# Patient Record
Sex: FEMALE | Race: WHITE | NOT HISPANIC OR LATINO | Employment: FULL TIME | URBAN - METROPOLITAN AREA
[De-identification: names, ages, dates, MRNs, and addresses within clinical notes are randomized per-mention and may not be internally consistent; named-entity substitution may affect disease eponyms.]

---

## 2017-01-12 ENCOUNTER — OFFICE VISIT (OUTPATIENT)
Dept: OBGYN | Facility: CLINIC | Age: 42
End: 2017-01-12
Payer: COMMERCIAL

## 2017-01-12 VITALS
DIASTOLIC BLOOD PRESSURE: 111 MMHG | WEIGHT: 293 LBS | HEART RATE: 139 BPM | BODY MASS INDEX: 53.92 KG/M2 | SYSTOLIC BLOOD PRESSURE: 154 MMHG | HEIGHT: 62 IN | OXYGEN SATURATION: 97 %

## 2017-01-12 DIAGNOSIS — R87.612 PAPANICOLAOU SMEAR OF CERVIX WITH LOW GRADE SQUAMOUS INTRAEPITHELIAL LESION (LGSIL): ICD-10-CM

## 2017-01-12 DIAGNOSIS — Z01.419 ENCOUNTER FOR GYNECOLOGICAL EXAMINATION WITHOUT ABNORMAL FINDING: Primary | ICD-10-CM

## 2017-01-12 PROCEDURE — G0145 SCR C/V CYTO,THINLAYER,RESCR: HCPCS | Performed by: OBSTETRICS & GYNECOLOGY

## 2017-01-12 PROCEDURE — 99396 PREV VISIT EST AGE 40-64: CPT | Performed by: OBSTETRICS & GYNECOLOGY

## 2017-01-12 PROCEDURE — 87624 HPV HI-RISK TYP POOLED RSLT: CPT | Performed by: OBSTETRICS & GYNECOLOGY

## 2017-01-12 NOTE — MR AVS SNAPSHOT
"              After Visit Summary   1/12/2017    Dariela Killian    MRN: [id number]           Patient Information     Date Of Birth          1975        Visit Information        Provider Department      1/12/2017 4:00 PM Angelica Saleem MD [facility]        Today's Diagnoses     Encounter for gynecological examination without abnormal finding    -  1        Follow-ups after your visit        Your next 10 appointments already scheduled     Feb 13, 2017  3:00 PM   SHORT with Angelica Saleem MD   [facility] ([facility])    [address] [place] [id number] [phone]              Who to contact     If you have questions or need follow up information about today's clinic visit or your schedule please contact [facility] directly at [phone].  Normal or non-critical lab and imaging results will be communicated to you by MyChart, letter or phone within 4 business days after the clinic has received the results. If you do not hear from us within 7 days, please contact the clinic through MyChart or phone. If you have a critical or abnormal lab result, we will notify you by phone as soon as possible.  Submit refill requests through Shopalytic or call your pharmacy and they will forward the refill request to us. Please allow 3 business days for your refill to be completed.          Additional Information About Your Visit        MyChart Information     Shopalytic lets you send messages to your doctor, view your test results, renew your prescriptions, schedule appointments and more. To sign up, go to [email or web address] . Click on \"Log in\" on the left side of the screen, which will take you to the Welcome page. Then click on \"Sign up Now\" on the right side of the page.     You will be asked to enter the access code listed below, as well as some personal information. Please follow the directions to create your username and " "password.     Your access code is: [email or web address]  Expires: 2017  4:21 PM     Your access code will  in 90 days. If you need help or a new code, please call your [place] clinic or [phone].        Care EveryWhere ID     This is your Care EveryWhere ID. This could be used by other organizations to access your [place] medical records  [id number]        Your Vitals Were     Pulse Height BMI (Body Mass Index) Pulse Oximetry Last Period Breastfeeding?    139 5' 1.5\" (1.562 m) 55.40 kg/m2 97% 2016 No       Blood Pressure from Last 3 Encounters:   17 154/111   07/02/15 148/92   05/14/15 120/88    Weight from Last 3 Encounters:   17 298 lb (135.172 kg)   07/02/15 292 lb 14.4 oz (132.859 kg)   05/14/15 292 lb 6.4 oz (132.632 kg)              We Performed the Following     Pap imaged thin layer diagnostic with HPV (select HPV order below)        Primary Care Provider Office Phone # Fax #    Gale Shabazz [phone]       [facility] [place] [address] [address]        Thank you!     Thank you for choosing [facility]  for your care. Our goal is always to provide you with excellent care. Hearing back from our patients is one way we can continue to improve our services. Please take a few minutes to complete the written survey that you may receive in the mail after your visit with us. Thank you!             Your Updated Medication List - Protect others around you: Learn how to safely use, store and throw away your medicines at www.disposemymeds.org.          This list is accurate as of: 17  4:21 PM.  Always use your most recent med list.                   Brand Name Dispense Instructions for use    Multi-vitamin Tabs tablet   Generic drug:  multivitamin, therapeutic with minerals      1 daily       ZYRTEC PO      None Entered         "

## 2017-01-12 NOTE — NURSING NOTE
"Chief Complaint   Patient presents with     Physical       Initial /111 mmHg  Pulse 139  Ht 5' 1.5\" (1.562 m)  Wt 298 lb (135.172 kg)  BMI 55.40 kg/m2  SpO2 97%  LMP 2016  Breastfeeding? No Estimated body mass index is 55.4 kg/(m^2) as calculated from the following:    Height as of this encounter: 5' 1.5\" (1.562 m).    Weight as of this encounter: 298 lb (135.172 kg).  BP completed using cuff size: X-large        The following HM Due: mammogram  pap smear      The following patient reported/Care Every where data was sent to:  P ABSTRACT QUALITY INITIATIVES [88154]  none     patient has appointment for today               "

## 2017-01-12 NOTE — PROGRESS NOTES
Dariela is a 41 year old  female who presents for annual exam.   Will send pap/HPV today, see problem list.  Cervix is visualized using a long narrow speculum.  Her blood pressure today is elevated, discussed, she will see one of the [facility] in followup.  Advised mammogram.      Menses are regular q 28-30 days and normal lasting 5 days.  Menses flow: normal and heavy.  Patient's last menstrual period was 2016.. Using none for contraception.  She is not currently considering pregnancy.  Besides routine health maintenance, she has no other health concerns today .  GYNECOLOGIC HISTORY:  Menarche: 12    Dariela is sexually active with 1male partner(s) and is currently in monogamous relationship with boyfriend.    History sexually transmitted infections:No STD history  STI testing offered?  Declined  ROJAS exposure: No  History of abnormal Pap smear: YES - updated in Problem List and Health Maintenance accordingly  Family history of breast CA: No  Family history of uterine/ovarian CA: No    Family history of colon CA: No    HEALTH MAINTENANCE:  Cholesterol: (  CHOLESTEROL   Date Value Ref Range Status   2010 223* 0 - 200 mg/dL Final     Comment:     LDL Cholesterol is the primary guide to therapy.   The NCEP recommends further evaluation of: patients with cholesterol <200   mg/dL   if additional risk factors are present, cholesterol >240 mg/dL, triglycerides   >150 mg/dL, or HDL <40 mg/dL.   10/09/2007 198 0 - 200 mg/dL Final     Comment:     LDL Cholesterol is the primary guide to therapy: LDL-cholesterol goal in high   risk patients is <100 mg/dL and in very high risk patients is <70 mg/dL.   The NCEP recommends further evaluation of: patients with cholesterol <200 mg/dL   if additional risk factors are present, cholesterol >240 mg/dL, triglycerides   >150 mg/dL, or HDL <40 mg/dL.    History of abnormal lipids: No  Mammo: 0 . History of abnormal Mammo: Not applicable.  Regular Self Breast Exams:  Yes  Calcium/Vitamin D intake: source:  dairy, dietary supplement(s) Adequate? Yes  TSH: (  TSH   Date Value Ref Range Status   2010 2.96 0.4 - 5.0 mU/L Final    )  Pap; (PAP      NIL   2015  PAP      NIL   2013  PAP      NIL   3/14/2013 )    HISTORY:  Obstetric History       T0      TAB0   SAB0   E0   M0   L0         Past Medical History   Diagnosis Date     Allergic rhinitis, cause unspecified      Allergic rhinitis - OTC using claritin     Obesity, unspecified      diet and exercising     Papanicolaou smear of cervix with low grade squamous intraepithelial lesion (LGSIL) 7/13/10, 3/18/11     DAKOTA I on colp,  colp WNl     ASCUS with positive high risk HPV ,      + HPV 16 colp - WNL     Cervical high risk HPV (human papillomavirus) test positive 2013     NIL pap, + HPV 16, colp WNL     High risk HPV infection 2015     NIL pap, + HPV (not 16 or 18)     Past Surgical History   Procedure Laterality Date     Tonsillectomy       Family History   Problem Relation Age of Onset     Allergies Mother      Allergies Brother      Neurologic Disorder Father      Parkinsons and ?dementia     CANCER Maternal Grandmother      stomach cancer      Bladder Cancer Brother      Social History     Social History     Marital Status: Single     Spouse Name: N/A     Number of Children: N/A     Years of Education: N/A     Social History Main Topics     Smoking status: Current Some Day Smoker -- 5 years     Smokeless tobacco: Never Used      Comment: ocassional - just socially going out     Alcohol Use: 0.0 oz/week     0 Standard drinks or equivalent per week      Comment: socially-minimal     Drug Use: No     Sexual Activity:     Partners: Male     Other Topics Concern     None     Social History Narrative       Current outpatient prescriptions:      ZYRTEC PO, None Entered, Disp: , Rfl:      MULTI-VITAMIN OR TABS, 1 daily, Disp: , Rfl: 0     Allergies   Allergen Reactions     Penicillins  "Hives     Seasonal Allergies        Past medical, surgical, social and family history were reviewed and updated in EPIC.    ROS:   C:     NEGATIVE for fever, chills, change in weight  I:       NEGATIVE for worrisome rashes, moles or lesions  E:     NEGATIVE for vision changes or irritation  E/M: NEGATIVE for ear, mouth and throat problems  R:     NEGATIVE for significant cough or SOB  CV:   NEGATIVE for chest pain, palpitations or peripheral edema  GI:     NEGATIVE for nausea, abdominal pain, heartburn, or change in bowel habits  :   NEGATIVE for frequency, dysuria, hematuria, vaginal discharge, or irregular bleeding  M:     NEGATIVE for significant arthralgias or myalgia  N:      NEGATIVE for weakness, dizziness or paresthesias  E:      NEGATIVE for temperature intolerance, skin/hair changes  P:      NEGATIVE for changes in mood or affect.    EXAM:  /111 mmHg  Pulse 139  Ht 5' 1.5\" (1.562 m)  Wt 298 lb (135.172 kg)  BMI 55.40 kg/m2  SpO2 97%  LMP 12/27/2016  Breastfeeding? No   BMI: Body mass index is 55.4 kg/(m^2).  Constitutional: overweight, alert and no distress  Head: Normocephalic. No masses, lesions, tenderness or abnormalities  Neck: Neck supple. Trachea midline. No adenopathy. Thyroid symmetric, normal size.   Cardiovascular: RRR.   Respiratory: Negative.   Breast: No nodularity, asymmetry or nipple discharge bilaterally.  Gastrointestinal: Abdomen soft, non-tender, non-distended. No masses, organomegaly.  :  Vulva:  No external lesions, normal female hair distribution, no inguinal adenopathy.    Urethra:  Midline, non-tender, well supported, no discharge  Vagina:  Moist, pink, no abnormal discharge, no lesions  Uterus:  Normal size, non-tender, freely mobile  Ovaries:  No masses appreciated, non-tender, mobile  Rectal Exam: deferred  Musculoskeletal: extremities normal  Skin: no suspicious lesions or rashes  Psychiatric: Affect appropriate, cooperative,mentation appears normal. "     COUNSELING:      reports that she has been smoking.  She has never used smokeless tobacco.  Tobacco Cessation Action Plan: social smoking, not ready to quit  Body mass index is 55.4 kg/(m^2).  Weight management plan: diet and exercise, discussed bariatric surgery   FRAX Risk Assessment    ASSESSMENT:  41 year old female with satisfactory annual exam  (Z01.419) Encounter for gynecological examination without abnormal finding  (primary encounter diagnosis)  Comment:   Plan: Pap imaged thin layer diagnostic with HPV         (select HPV order below)

## 2017-01-17 LAB
COPATH REPORT: NORMAL
PAP: NORMAL

## 2017-01-19 LAB
FINAL DIAGNOSIS: ABNORMAL
HPV HR 12 DNA CVX QL NAA+PROBE: POSITIVE
HPV16 DNA SPEC QL NAA+PROBE: NEGATIVE
HPV18 DNA SPEC QL NAA+PROBE: NEGATIVE
SPECIMEN DESCRIPTION: ABNORMAL

## 2017-03-13 ENCOUNTER — OFFICE VISIT (OUTPATIENT)
Dept: OBGYN | Facility: CLINIC | Age: 42
End: 2017-03-13
Payer: COMMERCIAL

## 2017-03-13 VITALS
SYSTOLIC BLOOD PRESSURE: 154 MMHG | DIASTOLIC BLOOD PRESSURE: 103 MMHG | WEIGHT: 293 LBS | HEIGHT: 62 IN | BODY MASS INDEX: 53.92 KG/M2 | OXYGEN SATURATION: 96 % | HEART RATE: 104 BPM

## 2017-03-13 DIAGNOSIS — B97.7 HPV IN FEMALE: Primary | ICD-10-CM

## 2017-03-13 LAB — BETA HCG QUAL IFA URINE: NEGATIVE

## 2017-03-13 PROCEDURE — 84703 CHORIONIC GONADOTROPIN ASSAY: CPT | Performed by: OBSTETRICS & GYNECOLOGY

## 2017-03-13 PROCEDURE — 88305 TISSUE EXAM BY PATHOLOGIST: CPT | Performed by: OBSTETRICS & GYNECOLOGY

## 2017-03-13 PROCEDURE — 57454 BX/CURETT OF CERVIX W/SCOPE: CPT | Performed by: OBSTETRICS & GYNECOLOGY

## 2017-03-13 NOTE — NURSING NOTE
"Chief Complaint   Patient presents with     Colposcopy       Initial BP (!) 154/103  Pulse 104  Ht 5' 1.5\" (1.562 m)  Wt 298 lb (135.2 kg)  SpO2 96%  Breastfeeding? No  BMI 55.39 kg/m2 Estimated body mass index is 55.39 kg/(m^2) as calculated from the following:    Height as of this encounter: 5' 1.5\" (1.562 m).    Weight as of this encounter: 298 lb (135.2 kg).  BP completed using cuff size: large        The following HM Due: NONE      The following patient reported/Care Every where data was sent to:  P ABSTRACT QUALITY INITIATIVES [05164]  none     n/a             "

## 2017-03-13 NOTE — PROGRESS NOTES
INDICATION: Dariela Killian is a 41 year old female who presents for colposcopy.  Pap smear was reported as NIL, HPV +.   Informed consent obtained for procedure.               COLPOSCOPIC EXAM:  Satisfactory    Using standard technique and acetic acid application, the cervix and vagina were examined using the colposcope.  The transformation zone appeared abnormal, with minimal white epithelium most prominent posterior, and representative biopsy of 6:00 was sent.  ECC was performed.     Colposcopic Impression: NIL pap.  other HPV +.  Possible dysplasia.     PLAN: Post Colposcopy Instructions were given.  Call in 1 week for biopsy results.  Will advise followup depending on results.

## 2017-03-13 NOTE — MR AVS SNAPSHOT
"              After Visit Summary   3/13/2017    Dariela Killian    MRN: [id number]           Patient Information     Date Of Birth          1975        Visit Information        Provider Department      3/13/2017 10:30 AM Angelica Saleem MD; RD [facility] [place]        Today's Diagnoses     [place] for NIL pap, other HPV +    -  1       Follow-ups after your visit        Who to contact     If you have questions or need follow up information about today's clinic visit or your schedule please contact [facility] directly at [phone].  Normal or non-critical lab and imaging results will be communicated to you by Arkhart, letter or phone within 4 business days after the clinic has received the results. If you do not hear from us within 7 days, please contact the clinic through Arkhart or phone. If you have a critical or abnormal lab result, we will notify you by phone as soon as possible.  Submit refill requests through Sendah Direct or call your pharmacy and they will forward the refill request to us. Please allow 3 business days for your refill to be completed.          Additional Information About Your Visit        MyChart Information     Sendah Direct lets you send messages to your doctor, view your test results, renew your prescriptions, schedule appointments and more. To sign up, go to [email or web address] . Click on \"Log in\" on the left side of the screen, which will take you to the Welcome page. Then click on \"Sign up Now\" on the right side of the page.     You will be asked to enter the access code listed below, as well as some personal information. Please follow the directions to create your username and password.     Your access code is: [email or web address]  Expires: 2017  5:21 PM     Your access code will  in 90 days. If you need help or a new code, please call your [facility] or [phone].        Care EveryWhere ID     This is your Care EveryWhere ID. This could be " "used by other organizations to access your [place] medical records  [id number]        Your Vitals Were     Pulse Height Pulse Oximetry Breastfeeding? BMI (Body Mass Index)       104 5' 1.5\" (1.562 m) 96% No 55.39 kg/m2        Blood Pressure from Last 3 Encounters:   03/13/17 (!) 154/103   01/12/17 (!) 154/111   07/02/15 (!) 148/92    Weight from Last 3 Encounters:   03/13/17 298 lb (135.2 kg)   01/12/17 298 lb (135.2 kg)   07/02/15 292 lb 14.4 oz (132.9 kg)              We Performed the Following     Beta HCG qual IFA urine     COLP CERVIX/UPPER VAGINA W BX CERVIX/ENDOCERV CURETT     Surgical pathology exam        Primary Care Provider Office Phone # Fax #    Gale Shabazz [phone]       [facility] [address] [address]        Thank you!     Thank you for choosing [facility] [place]  for your care. Our goal is always to provide you with excellent care. Hearing back from our patients is one way we can continue to improve our services. Please take a few minutes to complete the written survey that you may receive in the mail after your visit with us. Thank you!             Your Updated Medication List - Protect others around you: Learn how to safely use, store and throw away your medicines at www.disposemymeds.org.          This list is accurate as of: 3/13/17 11:01 AM.  Always use your most recent med list.                   Brand Name Dispense Instructions for use    Multi-vitamin Tabs tablet   Generic drug:  multivitamin, therapeutic with minerals      1 daily       ZYRTEC PO      None Entered         "

## 2017-03-15 LAB — COPATH REPORT: NORMAL

## 2017-11-09 ENCOUNTER — TRANSFERRED RECORDS (OUTPATIENT)
Dept: HEALTH INFORMATION MANAGEMENT | Facility: CLINIC | Age: 42
End: 2017-11-09

## 2017-11-10 ENCOUNTER — TELEPHONE (OUTPATIENT)
Dept: OBGYN | Facility: CLINIC | Age: 42
End: 2017-11-10

## 2017-11-10 NOTE — TELEPHONE ENCOUNTER
This was routed to RD triage pod A, but maybe it was meant for OB triage FYI. Pt has never been seen at [facility].    Thank you  Leonor Sloan, TACHON, RN  [facility]

## 2017-11-10 NOTE — TELEPHONE ENCOUNTER
Reason for Call:  Other FYI    Detailed comments: [facility] states the patient was seen yesterday, 11/08/2017 and an abscess in her breast was discovered. States it is not open and draining but they advised the patient to be seen today at a family practice clinic for anti-biotics. States patient is aware of that direction. Follow up if needed for questions. Thanks!    Can we leave a detailed message on this number? YES    Call taken on 11/10/2017 at 2:33 PM by Lucina Lerma

## 2017-11-10 NOTE — TELEPHONE ENCOUNTER
At this point in the day, I'm not sure she can get fit in.  Please advise the patient to go to nearest clinic that can fit her in or go to urgent care.  She does need to get started on abx. RAGHAV

## 2017-11-10 NOTE — TELEPHONE ENCOUNTER
TC to patient. Advised patient to go to UC to be seen so they can start her on abx. Pt stated understanding and she will go in.   Shadia Weaver, KASEY-BSN

## 2017-11-11 ENCOUNTER — TRANSFERRED RECORDS (OUTPATIENT)
Dept: HEALTH INFORMATION MANAGEMENT | Facility: CLINIC | Age: 42
End: 2017-11-11

## 2018-03-29 ENCOUNTER — TELEPHONE (OUTPATIENT)
Dept: OBGYN | Facility: CLINIC | Age: 43
End: 2018-03-29

## 2018-03-29 NOTE — TELEPHONE ENCOUNTER
Pt is past due for fu pap smear  Reminder letter was sent 2/25/18  LMTC and schedule at [facility]  Left this writers number in case of questions  If no reply and/or appt within two weeks (4/12/18) patient will be considered lost to pap tracking f/u.  Amanda Redd,   Pap Tracking

## 2018-05-24 ENCOUNTER — OFFICE VISIT (OUTPATIENT)
Dept: OBGYN | Facility: CLINIC | Age: 43
End: 2018-05-24
Payer: COMMERCIAL

## 2018-05-24 VITALS
HEIGHT: 61 IN | OXYGEN SATURATION: 95 % | BODY MASS INDEX: 54.75 KG/M2 | HEART RATE: 105 BPM | SYSTOLIC BLOOD PRESSURE: 137 MMHG | DIASTOLIC BLOOD PRESSURE: 88 MMHG | WEIGHT: 290 LBS

## 2018-05-24 DIAGNOSIS — Z01.419 ENCOUNTER FOR GYNECOLOGICAL EXAMINATION WITHOUT ABNORMAL FINDING: ICD-10-CM

## 2018-05-24 DIAGNOSIS — Z12.4 SCREENING FOR MALIGNANT NEOPLASM OF CERVIX: Primary | ICD-10-CM

## 2018-05-24 DIAGNOSIS — Z30.019 ENCOUNTER FOR INITIAL PRESCRIPTION OF CONTRACEPTIVES, UNSPECIFIED CONTRACEPTIVE: ICD-10-CM

## 2018-05-24 PROCEDURE — 99396 PREV VISIT EST AGE 40-64: CPT | Performed by: OBSTETRICS & GYNECOLOGY

## 2018-05-24 PROCEDURE — 88175 CYTOPATH C/V AUTO FLUID REDO: CPT | Performed by: OBSTETRICS & GYNECOLOGY

## 2018-05-24 PROCEDURE — 87624 HPV HI-RISK TYP POOLED RSLT: CPT | Performed by: OBSTETRICS & GYNECOLOGY

## 2018-05-24 RX ORDER — ACETAMINOPHEN AND CODEINE PHOSPHATE 120; 12 MG/5ML; MG/5ML
1 SOLUTION ORAL DAILY
Qty: 84 TABLET | Refills: 3 | Status: SHIPPED | OUTPATIENT
Start: 2018-05-24 | End: 2018-06-01

## 2018-05-24 NOTE — MR AVS SNAPSHOT
After Visit Summary   5/24/2018    Dariela Killian    MRN: [id number]           Patient Information     Date Of Birth          1975        Visit Information        Provider Department      5/24/2018 4:00 PM Angelica Saleem MD [facility]        Today's Diagnoses     Screening for malignant neoplasm of cervix    -  1    Encounter for gynecological examination without abnormal finding        Encounter for initial prescription of contraceptives, unspecified contraceptive           Follow-ups after your visit        Who to contact     If you have questions or need follow up information about today's clinic visit or your schedule please contact [facility] directly at [phone].  Normal or non-critical lab and imaging results will be communicated to you by MyChart, letter or phone within 4 business days after the clinic has received the results. If you do not hear from us within 7 days, please contact the clinic through NewVisions Communicationshart or phone. If you have a critical or abnormal lab result, we will notify you by phone as soon as possible.  Submit refill requests through 9Lenses or call your pharmacy and they will forward the refill request to us. Please allow 3 business days for your refill to be completed.          Additional Information About Your Visit        MyChart Information     9Lenses gives you secure access to your electronic health record. If you see a primary care provider, you can also send messages to your care team and make appointments. If you have questions, please call your primary care clinic.  If you do not have a primary care provider, please call [phone] and they will assist you.        Care EveryWhere ID     This is your Care EveryWhere ID. This could be used by other organizations to access your [place] medical records  [id number]        Your Vitals Were     Pulse Height Last Period Pulse Oximetry Breastfeeding? BMI (Body Mass Index)    105 5'  "1\" (1.549 m) 05/17/2018 95% No 54.8 kg/m2       Blood Pressure from Last 3 Encounters:   05/24/18 137/88   03/13/17 (!) 154/103   01/12/17 (!) 154/111    Weight from Last 3 Encounters:   05/24/18 290 lb (131.5 kg)   03/13/17 298 lb (135.2 kg)   01/12/17 298 lb (135.2 kg)              We Performed the Following     HPV High Risk Types DNA Cervical     Pap imaged thin layer diagnostic with HPV (select HPV order below)          Today's Medication Changes          These changes are accurate as of 5/24/18  4:49 PM.  If you have any questions, ask your nurse or doctor.               Start taking these medicines.        Dose/Directions    norethindrone 0.35 MG per tablet   Commonly known as:  MICRONOR   Used for:  Encounter for initial prescription of contraceptives, unspecified contraceptive   Started by:  Angelica Saleem MD        Dose:  1 tablet   Take 1 tablet (0.35 mg) by mouth daily   Quantity:  84 tablet   Refills:  3            Where to get your medicines      These medications were sent to [facility] [place], [place] - [address]  [address], [facility] [address]     Phone:  [phone]     norethindrone 0.35 MG per tablet                Primary Care Provider Office Phone # Fax #    Gale Shabazz [phone]       [address] [facility]   [facility] [address]        Equal Access to Services     ARACELI PEDERSEN AH: Javed farmero Sojeremyali, waaxda luqadaha, qaybta kaalmada adeegyada, brianne alanis. So [facility] [phone].    ATENCIÓN: Si habla español, tiene a wolfe disposición servicios gratuitos de asistencia lingüística. Llame al [phone].    We comply with applicable federal civil rights laws and [place] laws. We do not discriminate on the basis of race, color, national origin, age, disability, sex, sexual orientation, or gender identity.            Thank you!     Thank you for choosing [facility]  for your care. " Our goal is always to provide you with excellent care. Hearing back from our patients is one way we can continue to improve our services. Please take a few minutes to complete the written survey that you may receive in the mail after your visit with us. Thank you!             Your Updated Medication List - Protect others around you: Learn how to safely use, store and throw away your medicines at www.disposemymeds.org.          This list is accurate as of 5/24/18  4:49 PM.  Always use your most recent med list.                   Brand Name Dispense Instructions for use Diagnosis    Multi-vitamin Tabs tablet   Generic drug:  multivitamin, therapeutic with minerals      1 daily        norethindrone 0.35 MG per tablet    MICRONOR    84 tablet    Take 1 tablet (0.35 mg) by mouth daily    Encounter for initial prescription of contraceptives, unspecified contraceptive       ZYRTEC PO      None Entered

## 2018-05-24 NOTE — PROGRESS NOTES
Dariela is a 43 year old  female who presents for annual exam.   Periods are regular, heavy and crampy.  She is interested in treatment options.  Has lost 18# with low carb diet.  Had mammogram fall , biopsy was done (benign).  Pap due today, see problem list re abnormal pap.  We discussed oral contraceptive pills, Mirena.  Her blood pressure has been borderline.  After discussion, she will start Micronor.     Cervix is visualized using a long Daisy speculum.     Menses are regular q 28-30 days and normal and heavy lasting 5 days.  Menses flow: normal and heavy.  Patient's last menstrual period was 2018.. Using none for contraception.  She is not currently considering pregnancy.  Besides routine health maintenance, she has no other health concerns today .  GYNECOLOGIC HISTORY:  Menarche: 0    Dariela is not sexually active with 0male partner(s) and is not currently in monogamous relationship with 0.    History sexually transmitted infections:No STD history  STI testing offered?  Declined  ROJAS exposure: No  History of abnormal Pap smear: NO - age 30- 65 PAP every 3 years recommended  Family history of breast CA: No  Family history of uterine/ovarian CA: No    Family history of colon CA: No    HEALTH MAINTENANCE:  Cholesterol: (  Cholesterol   Date Value Ref Range Status   2010 223 (H) 0 - 200 mg/dL Final     Comment:     LDL Cholesterol is the primary guide to therapy.   The NCEP recommends further evaluation of: patients with cholesterol <200   mg/dL   if additional risk factors are present, cholesterol >240 mg/dL, triglycerides   >150 mg/dL, or HDL <40 mg/dL.   10/09/2007 198 0 - 200 mg/dL Final     Comment:     LDL Cholesterol is the primary guide to therapy: LDL-cholesterol goal in high   risk patients is <100 mg/dL and in very high risk patients is <70 mg/dL.   The NCEP recommends further evaluation of: patients with cholesterol <200 mg/dL   if additional risk factors are present,  cholesterol >240 mg/dL, triglycerides   >150 mg/dL, or HDL <40 mg/dL.    History of abnormal lipids: No  Mammo: 2017 . History of abnormal Mammo: infection.  Regular Self Breast Exams: Yes  Calcium/Vitamin D intake: source:  dairy, dietary supplement(s) Adequate? Yes  TSH: (  TSH   Date Value Ref Range Status   2010 2.96 0.4 - 5.0 mU/L Final    )  Pap; (  Lab Results   Component Value Date    PAP NIL 2017    PAP NIL 2015    PAP NIL 2013    )    HISTORY:  Obstetric History       T0      L0     SAB0   TAB0   Ectopic0   Multiple0   Live Births0         Past Medical History:   Diagnosis Date     Allergic rhinitis, cause unspecified     Allergic rhinitis - OTC using claritin     ASCUS with positive high risk HPV ,     + HPV 16 colp - WNL     Cervical high risk HPV (human papillomavirus) test positive 2013    NIL pap, + HPV 16, colp WNL     High risk HPV infection 2015, 17    NIL pap, + HPV (not 16 or 18)     Hx of colposcopy with cervical biopsy 2015, 17    WNL, 17: [place] WNL.      Obesity, unspecified     diet and exercising     Papanicolaou smear of cervix with low grade squamous intraepithelial lesion (LGSIL) 7/13/10, 3/18/11    DAKOTA I on colp,  colp WNl     Past Surgical History:   Procedure Laterality Date     TONSILLECTOMY  1988     Family History   Problem Relation Age of Onset     Allergies Mother      Allergies Brother      Neurologic Disorder Father      Parkinsons and ?dementia     CANCER Maternal Grandmother      stomach cancer      Bladder Cancer Brother      Social History     Social History     Marital status: Single     Spouse name: N/A     Number of children: N/A     Years of education: N/A     Social History Main Topics     Smoking status: Current Some Day Smoker     Years: 5.00     Smokeless tobacco: Never Used      Comment: ocassional - just socially going out     Alcohol use 0.0 oz/week     0 Standard drinks or  "equivalent per week      Comment: socially-minimal     Drug use: No     Sexual activity: Yes     Partners: Male     Other Topics Concern     None     Social History Narrative       Current Outpatient Prescriptions:      norethindrone (MICRONOR) 0.35 MG per tablet, Take 1 tablet (0.35 mg) by mouth daily, Disp: 84 tablet, Rfl: 3     MULTI-VITAMIN OR TABS, 1 daily, Disp: , Rfl: 0     ZYRTEC PO, None Entered, Disp: , Rfl:      Allergies   Allergen Reactions     Penicillins Hives     Seasonal Allergies        Past medical, surgical, social and family history were reviewed and updated in EPIC.    ROS:   C:     NEGATIVE for fever, chills, change in weight  I:       NEGATIVE for worrisome rashes, moles or lesions  E:     NEGATIVE for vision changes or irritation  E/M: NEGATIVE for ear, mouth and throat problems  R:     NEGATIVE for significant cough or SOB  CV:   NEGATIVE for chest pain, palpitations or peripheral edema  GI:     NEGATIVE for nausea, abdominal pain, heartburn, or change in bowel habits  :   NEGATIVE for frequency, dysuria, hematuria, vaginal discharge, or irregular bleeding  M:     NEGATIVE for significant arthralgias or myalgia  N:      NEGATIVE for weakness, dizziness or paresthesias  E:      NEGATIVE for temperature intolerance, skin/hair changes  P:      NEGATIVE for changes in mood or affect.    EXAM:  /88  Pulse 105  Ht 5' 1\" (1.549 m)  Wt 290 lb (131.5 kg)  LMP 05/17/2018  SpO2 95%  Breastfeeding? No  BMI 54.8 kg/m2   BMI: Body mass index is 54.8 kg/(m^2).  Constitutional: healthy, alert and no distress  Head: Normocephalic. No masses, lesions, tenderness or abnormalities  Neck: Neck supple. Trachea midline. No adenopathy. Thyroid symmetric, normal size.   Cardiovascular: RRR.   Respiratory: Negative.   Breast: No nodularity, asymmetry or nipple discharge bilaterally.  Gastrointestinal: Abdomen soft, non-tender, non-distended. No masses, organomegaly.  :  Vulva:  No external lesions, " normal female hair distribution, no inguinal adenopathy.    Urethra:  Midline, non-tender, well supported, no discharge  Vagina:  Moist, pink, no abnormal discharge, no lesions  Uterus:  Normal size, non-tender, freely mobile  Ovaries:  No masses appreciated, non-tender, mobile  Rectal Exam: deferred  Musculoskeletal: extremities normal  Skin: no suspicious lesions or rashes  Psychiatric: Affect appropriate, cooperative,mentation appears normal.     COUNSELING:      reports that she has been smoking.  She has smoked for the past 5.00 years. She has never used smokeless tobacco.    Body mass index is 54.8 kg/(m^2).  Weight management plan: as above  FRAX Risk Assessment    ASSESSMENT:  43 year old female with satisfactory annual exam  (Z12.4) Screening for malignant neoplasm of cervix  (primary encounter diagnosis)  Comment:   Plan: Pap imaged thin layer diagnostic with HPV         (select HPV order below), HPV High Risk Types         DNA Cervical            (Z01.419) Encounter for gynecological examination without abnormal finding  Comment:   Plan:     (Z30.019) Encounter for initial prescription of contraceptives, unspecified contraceptive  Comment:   Plan: norethindrone (MICRONOR) 0.35 MG per tablet

## 2018-05-24 NOTE — LETTER
June 6, 2018      Dariela MYERS Maureenjaycee  [address] [place] [address]    Dear ,      This letter is in regards to the PAP smear and HPV (Human Papillomavirus) test you had done recently. Your PAP test result is normal, but your HPV (Human Papillomavirus) test was positive.     About 80 percent of women have been exposed to HPV virus throughout their lifetime. There is no medication for the treatment of HPV. Typically your own immune system gets rid of the virus before it does harm. HPV is spread by direct skin-to-skin contact, including sexual intercourse, oral sex, anal sex, or any other contact involving the genital area (example: hand to genital contact). It is not possible to become infected with HPV by touching an object, such as a toilet seat. Most people who are infected with HPV have no signs or symptoms.    Things that you can do to boost your immune system and help your body get rid of HPV: get plenty of rest, eat a well-balanced diet of healthy foods, and stop smoking.     Please return in 1 year to repeat your pap smear and HPV test.     If you have additional questions regarding this result, please call [phone].    Sincerely,      Angelica Saleem MD/[facility]

## 2018-05-24 NOTE — NURSING NOTE
"Chief Complaint   Patient presents with     Physical       Initial /88  Pulse 105  Ht 5' 1\" (1.549 m)  Wt 290 lb (131.5 kg)  LMP 2018  SpO2 95%  Breastfeeding? No  BMI 54.8 kg/m2 Estimated body mass index is 54.8 kg/(m^2) as calculated from the following:    Height as of this encounter: 5' 1\" (1.549 m).    Weight as of this encounter: 290 lb (131.5 kg).  BP completed using cuff size: X-large        The following HM Due: pap smear      The following patient reported/Care Every where data was sent to:  P ABSTRACT QUALITY INITIATIVES [02420]  none      patient has appointment for today              "

## 2018-05-29 LAB
COPATH REPORT: NORMAL
PAP: NORMAL

## 2018-05-31 LAB
FINAL DIAGNOSIS: ABNORMAL
HPV HR 12 DNA CVX QL NAA+PROBE: POSITIVE
HPV16 DNA SPEC QL NAA+PROBE: NEGATIVE
HPV18 DNA SPEC QL NAA+PROBE: NEGATIVE
SPECIMEN DESCRIPTION: ABNORMAL
SPECIMEN SOURCE CVX/VAG CYTO: ABNORMAL

## 2018-06-01 RX ORDER — ACETAMINOPHEN AND CODEINE PHOSPHATE 120; 12 MG/5ML; MG/5ML
1 SOLUTION ORAL DAILY
Qty: 84 TABLET | Refills: 3 | Status: SHIPPED | OUTPATIENT
Start: 2018-06-01 | End: 2022-02-14

## 2019-06-24 ENCOUNTER — OFFICE VISIT (OUTPATIENT)
Dept: OBGYN | Facility: CLINIC | Age: 44
End: 2019-06-24
Payer: COMMERCIAL

## 2019-06-24 VITALS
BODY MASS INDEX: 58.01 KG/M2 | SYSTOLIC BLOOD PRESSURE: 127 MMHG | WEIGHT: 293 LBS | HEART RATE: 110 BPM | OXYGEN SATURATION: 95 % | DIASTOLIC BLOOD PRESSURE: 83 MMHG

## 2019-06-24 DIAGNOSIS — Z01.411 ENCOUNTER FOR GYNECOLOGICAL EXAMINATION WITH ABNORMAL FINDING: Primary | ICD-10-CM

## 2019-06-24 DIAGNOSIS — R21 RASH: ICD-10-CM

## 2019-06-24 DIAGNOSIS — Z12.4 SCREENING FOR MALIGNANT NEOPLASM OF CERVIX: ICD-10-CM

## 2019-06-24 DIAGNOSIS — R87.612 PAPANICOLAOU SMEAR OF CERVIX WITH LOW GRADE SQUAMOUS INTRAEPITHELIAL LESION (LGSIL): ICD-10-CM

## 2019-06-24 PROCEDURE — 99396 PREV VISIT EST AGE 40-64: CPT | Performed by: OBSTETRICS & GYNECOLOGY

## 2019-06-24 PROCEDURE — 87624 HPV HI-RISK TYP POOLED RSLT: CPT | Performed by: OBSTETRICS & GYNECOLOGY

## 2019-06-24 PROCEDURE — 88175 CYTOPATH C/V AUTO FLUID REDO: CPT | Performed by: OBSTETRICS & GYNECOLOGY

## 2019-06-24 RX ORDER — TRIAMCINOLONE ACETONIDE 0.25 MG/G
OINTMENT TOPICAL 2 TIMES DAILY
Qty: 15 G | Refills: 1 | Status: SHIPPED | OUTPATIENT
Start: 2019-06-24 | End: 2022-02-14

## 2019-06-24 NOTE — PROGRESS NOTES
Dariela Killian is a 44 year old  female who presents for annual exam.   See problem list re abnormal pap, multiple colps. Last pap was NIL, other HPV +.  If this Pap is other than NIL, negative she will plan to schedule evaluation at Dr. Kathie Antunez's dysplasia clinic at the [facility].  She has just had a mammogram.  She had quite a difficult year, her father (was diagnosed with Parkinson's over 20 years ago) was in a nursing home, stopped eating, and  in 2018.  She and her mother have been struggling with the aftermath of this.  She feels that things are getting better.    She has had a rash, pruritic, on her right lateral upper abdomen below the breast.  She has been treating with over-the-counter hydrocortisone cream.  This will help for a while but then the rash comes back.  Examination shows scattered erythematous slightly raised lesions.  Discussed, prescription is written for triamcinolone ointment, if this is not helpful she will consult with the personnel at the [facility].    Menses are regular q 28-30 days and normal and heavy lasting 7 days.  Menses flow: normal and heavy.  Patient's last menstrual period was 2019.. Using nothing s for contraception.  She is not currently considering pregnancy.  Besides routine health maintenance, she has no other health concerns today .  GYNECOLOGIC HISTORY:  Menarche:     Dariela is sexually active with 1male partner(s) and is currently in monogamous relationship with friend.    History sexually transmitted infections:HPV  STI testing offered?  Declined  ROJAS exposure: No  History of abnormal Pap smear: YES - updated in Problem List and Health Maintenance accordingly  Family history of breast CA: No  Family history of uterine/ovarian CA: No    Family history of colon CA: No    HEALTH MAINTENANCE:  Cholesterol: (  Cholesterol   Date Value Ref Range Status   2010 223 (H) 0 - 200 mg/dL Final     Comment:     LDL  Cholesterol is the primary guide to therapy.   The NCEP recommends further evaluation of: patients with cholesterol <200   mg/dL   if additional risk factors are present, cholesterol >240 mg/dL, triglycerides   >150 mg/dL, or HDL <40 mg/dL.   10/09/2007 198 0 - 200 mg/dL Final     Comment:     LDL Cholesterol is the primary guide to therapy: LDL-cholesterol goal in high   risk patients is <100 mg/dL and in very high risk patients is <70 mg/dL.   The NCEP recommends further evaluation of: patients with cholesterol <200 mg/dL   if additional risk factors are present, cholesterol >240 mg/dL, triglycerides   >150 mg/dL, or HDL <40 mg/dL.    History of abnormal lipids: No  Mammo: 2018 . History of abnormal Mammo: Not applicable.  Regular Self Breast Exams: Yes  Calcium/Vitamin D intake: source:  dairy, dietary supplement(s) Adequate? Yes  TSH: (  TSH   Date Value Ref Range Status   2010 2.96 0.4 - 5.0 mU/L Final    )  Pap; (  Lab Results   Component Value Date    PAP NIL 2018    PAP NIL 2017    PAP NIL 2015    )    HISTORY:  OB History    Para Term  AB Living   0 0 0 0 0 0   SAB TAB Ectopic Multiple Live Births   0 0 0 0 0     Past Medical History:   Diagnosis Date     Allergic rhinitis, cause unspecified     Allergic rhinitis - OTC using claritin     ASCUS with positive high risk HPV ,     + HPV 16 colp - WNL     Cervical high risk HPV (human papillomavirus) test positive 2013    NIL pap, + HPV 16, colp WNL     High risk HPV infection 2015, 17, 18    NIL pap, + HPV (not 16 or 18)     Hx of colposcopy with cervical biopsy 2015, 17    WNL, 17: [place] WNL.      Obesity, unspecified     diet and exercising     Papanicolaou smear of cervix with low grade squamous intraepithelial lesion (LGSIL) 7/13/10, 3/18/11    DAKOTA I on colp,  colp WNl     Past Surgical History:   Procedure Laterality Date     TONSILLECTOMY       Family History    Problem Relation Age of Onset     Allergies Mother      Allergies Brother      Neurologic Disorder Father         Parkinsons and ?dementia     Cancer Maternal Grandmother         stomach cancer      Bladder Cancer Brother      Social History     Socioeconomic History     Marital status: Single     Spouse name: None     Number of children: None     Years of education: None     Highest education level: None   Occupational History     None   Social Needs     Financial resource strain: None     Food insecurity:     Worry: None     Inability: None     Transportation needs:     Medical: None     Non-medical: None   Tobacco Use     Smoking status: Current Some Day Smoker     Years: 5.00     Smokeless tobacco: Never Used     Tobacco comment: ocassional - just socially going out   Substance and Sexual Activity     Alcohol use: Yes     Alcohol/week: 0.0 oz     Comment: socially-minimal     Drug use: No     Sexual activity: Yes     Partners: Male   Lifestyle     Physical activity:     Days per week: None     Minutes per session: None     Stress: None   Relationships     Social connections:     Talks on phone: None     Gets together: None     Attends Religion service: None     Active member of club or organization: None     Attends meetings of clubs or organizations: None     Relationship status: None     Intimate partner violence:     Fear of current or ex partner: None     Emotionally abused: None     Physically abused: None     Forced sexual activity: None   Other Topics Concern     Parent/sibling w/ CABG, MI or angioplasty before 65F 55M? Not Asked   Social History Narrative     None       Current Outpatient Medications:      MULTI-VITAMIN OR TABS, 1 daily, Disp: , Rfl: 0     norethindrone (MICRONOR) 0.35 MG per tablet, Take 1 tablet (0.35 mg) by mouth daily, Disp: 84 tablet, Rfl: 3     ZYRTEC PO, None Entered, Disp: , Rfl:      Allergies   Allergen Reactions     Penicillins Hives     Seasonal Allergies        Past  medical, surgical, social and family history were reviewed and updated in EPIC.    ROS:   C:     NEGATIVE for fever, chills, change in weight  I:       NEGATIVE for worrisome rashes, moles or lesions  E:     NEGATIVE for vision changes or irritation  E/M: NEGATIVE for ear, mouth and throat problems  R:     NEGATIVE for significant cough or SOB  CV:   NEGATIVE for chest pain, palpitations or peripheral edema  GI:     NEGATIVE for nausea, abdominal pain, heartburn, or change in bowel habits  :   NEGATIVE for frequency, dysuria, hematuria, vaginal discharge, or irregular bleeding  M:     NEGATIVE for significant arthralgias or myalgia  N:      NEGATIVE for weakness, dizziness or paresthesias  E:      NEGATIVE for temperature intolerance, skin/hair changes  P:      NEGATIVE for changes in mood or affect.    EXAM:  /83   Pulse 110   Wt 139.3 kg (307 lb)   LMP 06/08/2019   SpO2 95%   Breastfeeding? No   BMI 58.01 kg/m     BMI: Body mass index is 58.01 kg/m .  Constitutional: healthy, alert and no distress  Head: Normocephalic. No masses, lesions, tenderness or abnormalities  Neck: Neck supple. Trachea midline. No adenopathy. Thyroid symmetric, normal size.   Cardiovascular: RRR.   Respiratory: Negative.   Breast: No nodularity, asymmetry or nipple discharge bilaterally.  Gastrointestinal: Abdomen soft, non-tender, non-distended. No masses, organomegaly.  :  Vulva:  No external lesions, normal female hair distribution, no inguinal adenopathy.    Urethra:  Midline, non-tender, well supported, no discharge  Vagina:  Moist, pink, no abnormal discharge, no lesions  Uterus:  Normal size, non-tender, freely mobile  Ovaries:  No masses appreciated, non-tender, mobile  Rectal Exam: deferred  Musculoskeletal: extremities normal  Skin: See above   Psychiatric: Affect appropriate, cooperative,mentation appears normal.     COUNSELING:      reports that she has been smoking.  She has smoked for the past 5.00 years. She  has never used smokeless tobacco.  Tobacco Cessation Action Plan: Information offered: Patient not interested at this time  Body mass index is 58.01 kg/m .  Weight management plan: diet and exercise  FRAX Risk Assessment    ASSESSMENT:  44 year old female with satisfactory annual exam  (Z12.4) Screening for malignant neoplasm of cervix  (primary encounter diagnosis)  Comment:   Plan: Pap imaged thin layer screen with HPV -         recommended age 30 - 65 years (select HPV order        below), HPV High Risk Types DNA Cervical            (R87.612) Papanicolaou smear of cervix with low grade squamous intraepithelial lesion (LGSIL)  Comment:   Plan:     (R21) Rash  Comment:   Plan: triamcinolone (KENALOG) 0.025 % external         ointment

## 2019-06-27 LAB
COPATH REPORT: NORMAL
PAP: NORMAL

## 2019-07-09 DIAGNOSIS — B97.7 HPV IN FEMALE: Primary | ICD-10-CM

## 2019-07-23 DIAGNOSIS — B97.7 HIGH RISK HPV INFECTION: Primary | ICD-10-CM

## 2019-08-22 NOTE — TELEPHONE ENCOUNTER
ONCOLOGY INTAKE: Records Information      APPT INFORMATION:  Referring provider:  Dr. Angelica Saleem  Referring provider s clinic:  [facility]  Reason for visit/diagnosis:  Cervical Dysplasia  Has patient been notified of appointment date and time?: yes    RECORDS INFORMATION:  Were the records received with the referral (via [employer])? no    Has patient been seen for any external appt for this diagnosis? No - records are in epic

## 2019-09-05 ASSESSMENT — ENCOUNTER SYMPTOMS
DECREASED CONCENTRATION: 0
DECREASED LIBIDO: 0
NERVOUS/ANXIOUS: 0
INSOMNIA: 1
HOT FLASHES: 0

## 2019-09-09 DIAGNOSIS — Z01.818 PRE-OP EXAM: Primary | ICD-10-CM

## 2019-09-10 ENCOUNTER — PRE VISIT (OUTPATIENT)
Dept: ONCOLOGY | Facility: CLINIC | Age: 44
End: 2019-09-10

## 2019-09-10 ENCOUNTER — ONCOLOGY VISIT (OUTPATIENT)
Dept: ONCOLOGY | Facility: CLINIC | Age: 44
End: 2019-09-10
Attending: OBSTETRICS & GYNECOLOGY
Payer: COMMERCIAL

## 2019-09-10 VITALS
SYSTOLIC BLOOD PRESSURE: 138 MMHG | WEIGHT: 293 LBS | OXYGEN SATURATION: 97 % | TEMPERATURE: 98.3 F | BODY MASS INDEX: 53.92 KG/M2 | HEIGHT: 62 IN | HEART RATE: 82 BPM | DIASTOLIC BLOOD PRESSURE: 94 MMHG

## 2019-09-10 DIAGNOSIS — R87.612 PAPANICOLAOU SMEAR OF CERVIX WITH LOW GRADE SQUAMOUS INTRAEPITHELIAL LESION (LGSIL): ICD-10-CM

## 2019-09-10 DIAGNOSIS — Z01.818 PRE-OP EXAM: ICD-10-CM

## 2019-09-10 LAB — HCG UR QL: NEGATIVE

## 2019-09-10 PROCEDURE — 57454 BX/CURETT OF CERVIX W/SCOPE: CPT | Performed by: OBSTETRICS & GYNECOLOGY

## 2019-09-10 PROCEDURE — 57455 BIOPSY OF CERVIX W/SCOPE: CPT | Mod: ZF | Performed by: OBSTETRICS & GYNECOLOGY

## 2019-09-10 PROCEDURE — 88305 TISSUE EXAM BY PATHOLOGIST: CPT | Performed by: OBSTETRICS & GYNECOLOGY

## 2019-09-10 PROCEDURE — G0463 HOSPITAL OUTPT CLINIC VISIT: HCPCS | Mod: ZF

## 2019-09-10 PROCEDURE — 99203 OFFICE O/P NEW LOW 30 MIN: CPT | Mod: 25 | Performed by: OBSTETRICS & GYNECOLOGY

## 2019-09-10 PROCEDURE — 81025 URINE PREGNANCY TEST: CPT | Performed by: OBSTETRICS & GYNECOLOGY

## 2019-09-10 PROCEDURE — 57505 ENDOCERVICAL CURETTAGE: CPT | Mod: ZF | Performed by: OBSTETRICS & GYNECOLOGY

## 2019-09-10 ASSESSMENT — PAIN SCALES - GENERAL: PAINLEVEL: NO PAIN (0)

## 2019-09-10 ASSESSMENT — MIFFLIN-ST. JEOR: SCORE: 2021.2

## 2019-09-10 NOTE — PATIENT INSTRUCTIONS
Colposcopy cervix with biopsies    You will be contacted with results via [employer] with recs for follow-up    Kathie Quispe MD  Gynecologic Oncology  [facility]

## 2019-09-10 NOTE — NURSING NOTE
"Procedure discussed. Consent signed. Time out completed. Both forms placed into scanning.Prior to the start of the procedure and with procedural staff participation, I verbally confirmed the patient s identity using two indicators, relevant allergies, that the procedure was appropriate and matched the consent or emergent situation, and that the correct equipment/implants were available. Immediately prior to starting the procedure I conducted the Time Out with the procedural staff and re-confirmed the patient s name, procedure, and site/side. (The Joint Commission universal protocol was followed.)  Yes    Sedation (Moderate or Deep): None    Post procedure pain: 0 but \"crampy\" Tylenol, Ibuprofen and heat pack recommended     Yusra Stevens RN    "

## 2019-09-10 NOTE — NURSING NOTE
"Oncology Rooming Note    September 10, 2019 7:44 AM   Dariela Killian is a 44 year old female who presents for:    Chief Complaint   Patient presents with     Oncology Clinic Visit     New; Cervical Dysplasia     Initial Vitals: BP (!) 138/97   Pulse 82   Temp 98.3  F (36.8  C) (Oral)   Ht 1.575 m (5' 2\")   Wt 141.3 kg (311 lb 8 oz)   LMP 08/29/2019   SpO2 97%   BMI 56.97 kg/m   Estimated body mass index is 56.97 kg/m  as calculated from the following:    Height as of this encounter: 1.575 m (5' 2\").    Weight as of this encounter: 141.3 kg (311 lb 8 oz). Body surface area is 2.49 meters squared.  No Pain (0) Comment: Data Unavailable   Patient's last menstrual period was 08/29/2019.  Allergies reviewed: Yes  Medications reviewed: Yes    Medications: Medication refills not needed today.  Pharmacy name entered into [facility]: [id number] [place] [facility] [address] [facility] [address]    Clinical concerns:  Pt here for colposcopy, Dr Quispe was notified      Maribel Rahman CMA              "

## 2019-09-10 NOTE — LETTER
9/10/2019       RE: Dariela Killian  [address] [place] [address]     Dear Colleague,    Thank you for referring your patient, Dariela Killian, to the [facility]. Please see a copy of my visit note below.                            Consult Notes on Referred Patient    Date: 9/10/2019       Dr. Angelica Saleem MD  [address]  [place] [address]       RE: Dariela Killian  : 1975  ADELIA: 9/10/2019    Dear Dr. Angelica Saleem:    I had the pleasure of seeing your patient Dariela Killian here at the Gynecologic Cancer Clinic at the [facility] on 9/10/2019.  As you know she is a very pleasant 44 year old woman with a recent diagnosis of chronic HPV.  Given these findings she was subsequently sent to the Gynecologic Cancer Clinic for new patient consultation.     History obtained from patient and review of EMR. Briefly, patient is a 45yo G0 with the following history:    2010:  LSIL pap  2010:  Bx CIN1,  ECC -  3/2011:  Pap LSIL  2011:  Bx Cx CIN1  2011:  Pap ASCUS, HPV HR +  2011:  Cx neg  2012:  Pap ASCUS, HR HPV+16  2012:  Cx bx neg,   3/2013:  NILM pap  2013:  NILM pap, HR HPV+16  2014:  Cx bx neg  2015:  Pap NILM HR HPV+ other  2015:  ECC negative  2017:  Pap NILM, HR HPV+ other  3/2017:  Cx bx neg, ECC neg  2018:  Pap NILM, HR HPV+ other  2019:  Pap NILM, HR HPV+ other    Patient is sexually active, male partner, current smoker, uses micronor.  Denies any abnormal bleeding.       Review of Systems:  Answers for HPI/ROS submitted by the patient on 2019   General Symptoms: No  Skin Symptoms: No  HENT Symptoms: No  EYE SYMPTOMS: No  HEART SYMPTOMS: No  LUNG SYMPTOMS: No  INTESTINAL SYMPTOMS: No  URINARY SYMPTOMS: No  GYNECOLOGIC SYMPTOMS: Yes  BREAST SYMPTOMS: No  SKELETAL SYMPTOMS: No  BLOOD SYMPTOMS: No  NERVOUS SYSTEM SYMPTOMS: No  MENTAL HEALTH SYMPTOMS: Yes  Bleeding or spotting between periods: Yes  Heavy or painful  periods: Yes  Irregular periods: No  Vaginal discharge: No  Hot flashes: No  Genital ulcers: No  Reduced libido: No  Painful intercourse: No  Difficulty with sexual arousal: No  Post-menopausal bleeding: No  Nervous or Anxious: No  Trouble sleeping: Yes  Trouble thinking or concentrating: No    Past Medical History:    Past Medical History:   Diagnosis Date     Allergic rhinitis, cause unspecified     Allergic rhinitis - OTC using claritin     ASCUS with positive high risk HPV 11/11, 6/12    + HPV 16 colp - WNL     Cervical high risk HPV (human papillomavirus) test positive 11/2013, 06/24/19    See problem list.      High risk HPV infection 5/2015, 01/12/17, 05/24/18    NIL pap, + HPV (not 16 or 18)     Hx of colposcopy with cervical biopsy 07/03/2015, 03/13/17    WNL, 03/13/17: [place] WNL.      Obesity, unspecified     diet and exercising     Papanicolaou smear of cervix with low grade squamous intraepithelial lesion (LGSIL) 7/13/10, 3/18/11    DAKOTA I on colp, 12/11 colp WNl         Past Surgical History:    Past Surgical History:   Procedure Laterality Date     TONSILLECTOMY  1988         Health Maintenance:  Health Maintenance Due   Topic Date Due     HIV SCREENING  05/11/1991     PNEUMOCOCCAL IMMUNIZATION 19-64 MEDIUM RISK (1 of 1 - PPSV23) 05/11/1995     DTAP/TDAP/TD IMMUNIZATION (2 - Td) 05/01/2015     INFLUENZA VACCINE (1) 09/01/2019           Current Medications:     has a current medication list which includes the following prescription(s): multi-vitamin, norethindrone, triamcinolone, and cetirizine hcl.       Allergies:     [unfilled]        Social History:     Social History     Tobacco Use     Smoking status: Current Some Day Smoker     Years: 5.00     Smokeless tobacco: Never Used     Tobacco comment: ocassional - just socially going out   Substance Use Topics     Alcohol use: Yes     Alcohol/week: 0.0 standard drinks     Comment: socially-minimal       History   Drug Use No           Family History:  "    The patient's family history is notable for :    Family History   Problem Relation Age of Onset     Allergies Mother      Allergies Brother      Neurologic Disorder Father         Parkinsons and ?dementia     Cancer Maternal Grandmother         stomach cancer      Bladder Cancer Brother          Physical Exam:     BP (!) 138/94   Pulse 82   Temp 98.3  F (36.8  C) (Oral)   Ht 1.575 m (5' 2\")   Wt 141.3 kg (311 lb 8 oz)   LMP 08/29/2019   SpO2 97%   BMI 56.97 kg/m     Body mass index is 56.97 kg/m .    General Appearance: healthy and alert, no distress     Musculoskeletal: extremities non tender and without edema    Skin: no lesions or rashes     Neurological: normal gait, no gross defects     Psychiatric: appropriate mood and affect                               Hematological: normal cervical, supraclavicular and inguinal lymph nodes     Gastrointestinal:       abdomen soft, non-tender, non-distended, no organomegaly or masses    Colposcopy Note:    Written and verbal informed consent obtained.    Prior to the start of the procedure and with procedural staff participation, I verbally confirmed the patient s identity using two indicators, relevant allergies, that the procedure was appropriate and matched the consent or emergent situation, and that the correct equipment/implants were available. Immediately prior to starting the procedure I conducted the Time Out with the procedural staff and re-confirmed the patient s name, procedure, and site/side. (The Joint Commission universal protocol was followed.)  Yes    Sedation (Moderate or Deep): None    Genitourinary: External genitalia and urethral meatus appears normal, BUS negative, no lesions.  Vagina is smooth without nodularity or masses.  Cervix small, appears normal and without lesions.  Bimanual exam reveal no masses, nodularity or fullness.      After placement of speculum and full visualization of cervix, colposcopy of cervix and upper vagina " performed.  Entire cervix and upper vagina visualized, no gross lesions.  Cervix clean with saline and green filter applied with no abnormal vascularity noted.  5% acetic acid solution applied to cervix and visualized under colposcopic enhancement.  Small os, TMZ not seen.  ECC performed, mild discomfort and scarring of os.  No obvious HPV related changes.  Lugol's applied, no non-staining areas.  Due to chronic HPV related carrier, random biopsies (x 3) done on cervix and sent together.  Hemostasis with silver nitrate.  Patient tolerated.       Assessment:    Dariela Killian is a 44 year old woman with chronic HPV in setting of normal pap smear    A total of 60 minutes was spent with the patient, 35 minutes of which were spent in counseling the patient and/or treatment planning, 25 minutes procedure time      Plan:     1.)    Chronic HPV in setting of normal pap smear:  Reviewed history in detail with patient.  Reviewed chronicity of HPV infection, association with tobacco use, higher risk for cervical dysplasia, need for long term follow-up.  Suspect that she will be a long-term carrier of HPV.  Would recommend yearly pap and colpo given nature of chronic HPV carrier.  Random biopsies for HR HPV and ECC. Patient has always only had low risk cytology (CIN1) in the past but most likely, will be a chronic carrier. Would continue to genotype for HR HPV 16/18 as risk for progression to CIN2/3 is higher than with HR HPV-other.     Discussed in detail. Questions answered.  Will contact patient with biopsy results and recs for follow-up.     Kathie Quispe MD  Gynecologic Oncology  [facility]      CC  Patient Care Team:  Gale Shabazz MD as PCP - General (Family Practice)  KRISTOPHER NORIEGA

## 2019-09-11 LAB — COPATH REPORT: NORMAL

## 2019-09-25 ENCOUNTER — TELEPHONE (OUTPATIENT)
Dept: FAMILY MEDICINE | Facility: CLINIC | Age: 44
End: 2019-09-25

## 2019-09-25 NOTE — TELEPHONE ENCOUNTER
Hi Dr. Quispe,  Please review [place] results from 09/10/19 and make recommendations. The pt hasn't been notified yet.   Thanks,  Agustina Townsend RN-Pap Tracking     Collected: 9/10/2019   Received: 9/10/2019   Reported: 9/11/2019 16:57   Ordering Phy(s): SEBASTIAN QUISPE     For improved result formatting, select 'View Enhanced Report Format' under    Linked Documents section.     SPECIMEN(S):   A: Cervical biopsy   B: Endocervical curettings     FINAL DIAGNOSIS:   A. CERVIX, BIOPSY:   - Ectocervical tissue, negative for dysplasia or malignancy   - No transformation zone identified     B. ENDOCERVIX, CURETTAGE:   - Fragments of superficial squamous epithelium   - Negative for dysplasia or malignancy   - No endocervix or transformation zone identified

## 2019-09-29 ENCOUNTER — HEALTH MAINTENANCE LETTER (OUTPATIENT)
Age: 44
End: 2019-09-29

## 2019-10-09 NOTE — PROGRESS NOTES
Consult Notes on Referred Patient    Date: 9/10/2019       Dr. Angelica Saleem MD  [address] [facility] [address]  [place] [address]       RE: Dariela Killian  : 1975  ADELIA: 9/10/2019    Dear Dr. Angelica Saleem:    I had the pleasure of seeing your patient Dariela Killian here at the Gynecologic Cancer Clinic at the [facility] on 9/10/2019.  As you know she is a very pleasant 44 year old woman with a recent diagnosis of chronic HPV.  Given these findings she was subsequently sent to the Gynecologic Cancer Clinic for new patient consultation.     History obtained from patient and review of EMR. Briefly, patient is a 45yo G0 with the following history:    2010:  LSIL pap  2010:  Bx CIN1,  ECC -  3/2011:  Pap LSIL  2011:  Bx Cx CIN1  2011:  Pap ASCUS, HPV HR +  2011:  Cx neg  2012:  Pap ASCUS, HR HPV+16  2012:  Cx bx neg,   3/2013:  NILM pap  2013:  NILM pap, HR HPV+16  2014:  Cx bx neg  2015:  Pap NILM HR HPV+ other  2015:  ECC negative  2017:  Pap NILM, HR HPV+ other  3/2017:  Cx bx neg, ECC neg  2018:  Pap NILM, HR HPV+ other  2019:  Pap NILM, HR HPV+ other    Patient is sexually active, male partner, current smoker, uses micronor.  Denies any abnormal bleeding.       Review of Systems:  Answers for HPI/ROS submitted by the patient on 2019   General Symptoms: No  Skin Symptoms: No  HENT Symptoms: No  EYE SYMPTOMS: No  HEART SYMPTOMS: No  LUNG SYMPTOMS: No  INTESTINAL SYMPTOMS: No  URINARY SYMPTOMS: No  GYNECOLOGIC SYMPTOMS: Yes  BREAST SYMPTOMS: No  SKELETAL SYMPTOMS: No  BLOOD SYMPTOMS: No  NERVOUS SYSTEM SYMPTOMS: No  MENTAL HEALTH SYMPTOMS: Yes  Bleeding or spotting between periods: Yes  Heavy or painful periods: Yes  Irregular periods: No  Vaginal discharge: No  Hot flashes: No  Genital ulcers: No  Reduced libido: No  Painful intercourse: No  Difficulty with sexual arousal: No  Post-menopausal bleeding: No  Nervous or Anxious: No  Trouble  sleeping: Yes  Trouble thinking or concentrating: No    Past Medical History:    Past Medical History:   Diagnosis Date     Allergic rhinitis, cause unspecified     Allergic rhinitis - OTC using claritin     ASCUS with positive high risk HPV 11/11, 6/12    + HPV 16 colp - WNL     Cervical high risk HPV (human papillomavirus) test positive 11/2013, 06/24/19    See problem list.      High risk HPV infection 5/2015, 01/12/17, 05/24/18    NIL pap, + HPV (not 16 or 18)     Hx of colposcopy with cervical biopsy 07/03/2015, 03/13/17    WNL, 03/13/17: [place] WNL.      Obesity, unspecified     diet and exercising     Papanicolaou smear of cervix with low grade squamous intraepithelial lesion (LGSIL) 7/13/10, 3/18/11    DAKOTA I on colp, 12/11 colp WNl         Past Surgical History:    Past Surgical History:   Procedure Laterality Date     TONSILLECTOMY  1988         Health Maintenance:  Health Maintenance Due   Topic Date Due     HIV SCREENING  05/11/1991     PNEUMOCOCCAL IMMUNIZATION 19-64 MEDIUM RISK (1 of 1 - PPSV23) 05/11/1995     DTAP/TDAP/TD IMMUNIZATION (2 - Td) 05/01/2015     INFLUENZA VACCINE (1) 09/01/2019           Current Medications:     has a current medication list which includes the following prescription(s): multi-vitamin, norethindrone, triamcinolone, and cetirizine hcl.       Allergies:     [unfilled]        Social History:     Social History     Tobacco Use     Smoking status: Current Some Day Smoker     Years: 5.00     Smokeless tobacco: Never Used     Tobacco comment: ocassional - just socially going out   Substance Use Topics     Alcohol use: Yes     Alcohol/week: 0.0 standard drinks     Comment: socially-minimal       History   Drug Use No           Family History:     The patient's family history is notable for :    Family History   Problem Relation Age of Onset     Allergies Mother      Allergies Brother      Neurologic Disorder Father         Parkinsons and ?dementia     Cancer Maternal Grandmother      "    stomach cancer      Bladder Cancer Brother          Physical Exam:     BP (!) 138/94   Pulse 82   Temp 98.3  F (36.8  C) (Oral)   Ht 1.575 m (5' 2\")   Wt 141.3 kg (311 lb 8 oz)   LMP 08/29/2019   SpO2 97%   BMI 56.97 kg/m    Body mass index is 56.97 kg/m .    General Appearance: healthy and alert, no distress     Musculoskeletal: extremities non tender and without edema    Skin: no lesions or rashes     Neurological: normal gait, no gross defects     Psychiatric: appropriate mood and affect                               Hematological: normal cervical, supraclavicular and inguinal lymph nodes     Gastrointestinal:       abdomen soft, non-tender, non-distended, no organomegaly or masses    Colposcopy Note:    Written and verbal informed consent obtained.    Prior to the start of the procedure and with procedural staff participation, I verbally confirmed the patient s identity using two indicators, relevant allergies, that the procedure was appropriate and matched the consent or emergent situation, and that the correct equipment/implants were available. Immediately prior to starting the procedure I conducted the Time Out with the procedural staff and re-confirmed the patient s name, procedure, and site/side. (The Joint Commission universal protocol was followed.)  Yes    Sedation (Moderate or Deep): None    Genitourinary: External genitalia and urethral meatus appears normal, BUS negative, no lesions.  Vagina is smooth without nodularity or masses.  Cervix small, appears normal and without lesions.  Bimanual exam reveal no masses, nodularity or fullness.      After placement of speculum and full visualization of cervix, colposcopy of cervix and upper vagina performed.  Entire cervix and upper vagina visualized, no gross lesions.  Cervix clean with saline and green filter applied with no abnormal vascularity noted.  5% acetic acid solution applied to cervix and visualized under colposcopic enhancement.  Small " os, TMZ not seen.  ECC performed, mild discomfort and scarring of os.  No obvious HPV related changes.  Lugol's applied, no non-staining areas.  Due to chronic HPV related carrier, random biopsies (x 3) done on cervix and sent together.  Hemostasis with silver nitrate.  Patient tolerated.       Assessment:    Dariela Killian is a 44 year old woman with chronic HPV in setting of normal pap smear    A total of 60 minutes was spent with the patient, 35 minutes of which were spent in counseling the patient and/or treatment planning, 25 minutes procedure time      Plan:     1.)    Chronic HPV in setting of normal pap smear:  Reviewed history in detail with patient.  Reviewed chronicity of HPV infection, association with tobacco use, higher risk for cervical dysplasia, need for long term follow-up.  Suspect that she will be a long-term carrier of HPV.  Would recommend yearly pap and colpo given nature of chronic HPV carrier.  Random biopsies for HR HPV and ECC. Patient has always only had low risk cytology (CIN1) in the past but most likely, will be a chronic carrier. Would continue to genotype for HR HPV 16/18 as risk for progression to CIN2/3 is higher than with HR HPV-other.     Discussed in detail. Questions answered.  Will contact patient with biopsy results and recs for follow-up.     Kathie Quispe MD  Gynecologic Oncology  [facility]      CC  Patient Care Team:  Gale Shabazz MD as PCP - General (Family Practice)  KRISTOPHER NORIEGA

## 2019-10-14 NOTE — TELEPHONE ENCOUNTER
09/10/19: [place] Bx and ECC Neg for Dysplasia. Plan cotest in 1 year with regular provider. Provider advised the pt of the results and recommendations via Pixtr. Pt viewed Pixtr message.  Agustina Townsend RN-Pap Tracking

## 2020-05-19 ENCOUNTER — HOSPITAL ENCOUNTER (EMERGENCY)
Facility: CLINIC | Age: 45
Discharge: HOME | End: 2020-05-19
Attending: EMERGENCY MEDICINE | Admitting: EMERGENCY MEDICINE
Payer: COMMERCIAL

## 2020-05-19 ENCOUNTER — APPOINTMENT (OUTPATIENT)
Dept: ULTRASOUND IMAGING | Facility: CLINIC | Age: 45
End: 2020-05-19
Attending: EMERGENCY MEDICINE
Payer: COMMERCIAL

## 2020-05-19 ENCOUNTER — APPOINTMENT (OUTPATIENT)
Dept: MAMMOGRAPHY | Facility: CLINIC | Age: 45
End: 2020-05-19
Attending: EMERGENCY MEDICINE
Payer: COMMERCIAL

## 2020-05-19 VITALS
SYSTOLIC BLOOD PRESSURE: 141 MMHG | HEIGHT: 61 IN | WEIGHT: 264.77 LBS | RESPIRATION RATE: 18 BRPM | OXYGEN SATURATION: 95 % | HEART RATE: 90 BPM | TEMPERATURE: 98.6 F | BODY MASS INDEX: 49.99 KG/M2 | DIASTOLIC BLOOD PRESSURE: 86 MMHG

## 2020-05-19 DIAGNOSIS — L03.319 CELLULITIS OF TRUNK, UNSPECIFIED SITE OF TRUNK: ICD-10-CM

## 2020-05-19 DIAGNOSIS — N61.1 BREAST ABSCESS: ICD-10-CM

## 2020-05-19 LAB
BASOPHILS # BLD AUTO: 0 10E9/L (ref 0–0.2)
BASOPHILS NFR BLD AUTO: 0.3 %
DIFFERENTIAL METHOD BLD: ABNORMAL
EOSINOPHIL # BLD AUTO: 0.4 10E9/L (ref 0–0.7)
EOSINOPHIL NFR BLD AUTO: 2.5 %
ERYTHROCYTE [DISTWIDTH] IN BLOOD BY AUTOMATED COUNT: 13.4 % (ref 10–15)
GRAM STN SPEC: ABNORMAL
HCT VFR BLD AUTO: 46.8 % (ref 35–47)
HGB BLD-MCNC: 15.4 G/DL (ref 11.7–15.7)
IMM GRANULOCYTES # BLD: 0.1 10E9/L (ref 0–0.4)
IMM GRANULOCYTES NFR BLD: 0.7 %
INR PPP: 0.95 (ref 0.86–1.14)
LYMPHOCYTES # BLD AUTO: 2.9 10E9/L (ref 0.8–5.3)
LYMPHOCYTES NFR BLD AUTO: 21.3 %
MCH RBC QN AUTO: 28.2 PG (ref 26.5–33)
MCHC RBC AUTO-ENTMCNC: 32.9 G/DL (ref 31.5–36.5)
MCV RBC AUTO: 86 FL (ref 78–100)
MONOCYTES # BLD AUTO: 1.1 10E9/L (ref 0–1.3)
MONOCYTES NFR BLD AUTO: 7.7 %
NEUTROPHILS # BLD AUTO: 9.3 10E9/L (ref 1.6–8.3)
NEUTROPHILS NFR BLD AUTO: 67.5 %
NRBC # BLD AUTO: 0 10*3/UL
NRBC BLD AUTO-RTO: 0 /100
PLATELET # BLD AUTO: 209 10E9/L (ref 150–450)
RBC # BLD AUTO: 5.46 10E12/L (ref 3.8–5.2)
SPECIMEN SOURCE: ABNORMAL
WBC # BLD AUTO: 13.8 10E9/L (ref 4–11)

## 2020-05-19 PROCEDURE — 87205 SMEAR GRAM STAIN: CPT | Performed by: EMERGENCY MEDICINE

## 2020-05-19 PROCEDURE — 85025 COMPLETE CBC W/AUTO DIFF WBC: CPT | Performed by: EMERGENCY MEDICINE

## 2020-05-19 PROCEDURE — 87070 CULTURE OTHR SPECIMN AEROBIC: CPT | Performed by: EMERGENCY MEDICINE

## 2020-05-19 PROCEDURE — 87075 CULTR BACTERIA EXCEPT BLOOD: CPT | Performed by: EMERGENCY MEDICINE

## 2020-05-19 PROCEDURE — 25000125 ZZHC RX 250: Performed by: RADIOLOGY

## 2020-05-19 PROCEDURE — 87077 CULTURE AEROBIC IDENTIFY: CPT | Performed by: EMERGENCY MEDICINE

## 2020-05-19 PROCEDURE — 85610 PROTHROMBIN TIME: CPT | Performed by: EMERGENCY MEDICINE

## 2020-05-19 PROCEDURE — 76642 ULTRASOUND BREAST LIMITED: CPT | Mod: RT

## 2020-05-19 PROCEDURE — 10160 PNXR ASPIR ABSC HMTMA BULLA: CPT

## 2020-05-19 PROCEDURE — 77066 DX MAMMO INCL CAD BI: CPT

## 2020-05-19 PROCEDURE — 99284 EMERGENCY DEPT VISIT MOD MDM: CPT | Mod: 25

## 2020-05-19 RX ORDER — CLINDAMYCIN HCL 150 MG
450 CAPSULE ORAL 3 TIMES DAILY
Qty: 63 CAPSULE | Refills: 0 | Status: SHIPPED | OUTPATIENT
Start: 2020-05-19 | End: 2020-05-26

## 2020-05-19 RX ADMIN — LIDOCAINE HYDROCHLORIDE 5 ML: 10 INJECTION, SOLUTION EPIDURAL; INFILTRATION; INTRACAUDAL; PERINEURAL at 15:10

## 2020-05-19 ASSESSMENT — ENCOUNTER SYMPTOMS
MYALGIAS: 1
ROS SKIN COMMENTS: ABSCESS

## 2020-05-19 ASSESSMENT — MIFFLIN-ST. JEOR: SCORE: 1788.38

## 2020-05-19 NOTE — ED PROVIDER NOTES
"  History     Chief Complaint:  Breast Pain    HPI:  Dariela Killian is a 45 year old female who presents with breast pain. Patient has had right breast pain for the past 3 days. She also reports the development of a lump the \"size of a lemon\" on her right breast and was seen in urgent care for this earlier today. The swelling of her right breast has worsened and she reports pus drainage from her right nipple yesterday. Patient has history of a small right breast abscess that was treated with antibiotics a few years ago. Not breastfeeding or pregnant.    Allergies:  Penicillins  Seasonal Allergies     Medications:    Micronor    Past Medical History:    ASCUS, positive high risk HPV     Past Surgical History:    Tonsillectomy     Family History:    Allergies  Neurologic disorder  Stomach cancer  Bladder cancer    Social History:  Smoking status: some days  Alcohol use: socially  Drug use: no  Patient presents alone.  PCP: Gale Shabazz    Marital Status:  Single     Review of Systems   Musculoskeletal: Positive for myalgias.        Right breast.   Skin:        Abscess   10 point review of systems was obtained and negative other than mentioned above.    Physical Exam     Patient Vitals for the past 24 hrs:   BP Temp Temp src Pulse Heart Rate Resp SpO2 Height Weight   05/19/20 1355 (!) 148/93 98.6  F (37  C) Oral 87 -- -- 97 % -- --   05/19/20 1211 (!) 165/106 98.9  F (37.2  C) Oral -- 106 18 97 % 1.549 m (5' 1\") 120.1 kg (264 lb 12.4 oz)     Physical Exam   General: Resting comfortably on the gurney  Eyes:  The pupils are equal and round    Conjunctivae and sclerae are normal  ENT:    Moist mucous membranes  Neck:  Normal range of motion  CV:  Tachycardic rate and rhythm    Skin warm and well perfused   Resp:  Non labored breathing on room air  MS:  Normal muscular tone  Skin:  Area of erythema and warmth on right breast medial to nipple. Small lump medial nipple. Female nurse in room during exam  Neuro:   Awake, " alert.      Speech is normal and fluent.    Face is symmetric.     Moves all extremities equally  Psych: Normal affect.  Appropriate interactions.    Emergency Department Course     Imaging:  Radiology findings were communicated with the patient who voiced understanding of the findings.    US Breast Right, Limited  BI-RADS CATEGORY: 2 - Benign Finding(s).  Per radiology.    US BREAST ABSCESS SEROMA DRAINAGE  Successful ultrasound guided abscess aspiration.  Per radiology.    MA Diagnostic Digital Bilateral  BI-RADS CATEGORY: 2 - Benign Finding(s).  Per radiology.    RECOMMENDED FOLLOW-UP: Annual Mammography, clinical follow-up.  Recommend routine annual screening mammography. Clinical follow-up is  recommended, with management dependent on the results/findings of the  physical examination.     The results and recommendation were communicated to the patient at the  conclusion of today's appointment.    Per radiology.    Laboratory:  Laboratory findings were communicated with the patient who voiced understanding of the findings.    CBC: WBC 13.8 (H), HGB 15.4,   INR: 0.95    Interventions:   1510 1% lidocaine, 5 mL, subcutaneous    Emergency Department Course:    1244  Past medical records, nursing notes, and vitals reviewed.    1249  I performed an exam of the patient and obtained history, as documented above.    1357 IV was inserted and blood was drawn for laboratory testing, results above.    1422 The patient was sent for breast US and MA while in the emergency department, results above.     1545 Findings and plan explained to the Patient. Patient discharged home with instructions regarding supportive care, medications, and reasons to return. The importance of close follow-up was reviewed.     Impression & Plan     Medical Decision Making:  Dariela Killian is a 45 year old female who presents to the emergency department today for evaluation of breast pain. Has evidence of cellulitis and possible abscess on  exam. Sent to breast center where they did US and mammogram and drained small abscess. Will also do antibiotics for home given area of cellulitis. Warned of risk of c difficile and will take probiotics. Recommended f/u with OB for follow-up. Area outlined. Recommended warm compresses as well. REasons to return to ED discussed with patient and understands may need admission if fails oral antibiotics.    Diagnosis:    ICD-10-CM    1. Breast abscess  N61.1    2. Cellulitis of trunk, unspecified site of trunk  L03.319       Disposition:  Discharged to home.     Discharge Medications:  New Prescriptions    CLINDAMYCIN (CLEOCIN) 150 MG CAPSULE    Take 3 capsules (450 mg) by mouth 3 times daily for 7 days     Scribe Disclosure:  I, Gabbi Young, am serving as a scribe at 12:49 PM on 5/19/2020 to document services personally performed by Janeth Milligan MD based on my observations and the provider's statements to me.      Gabbi Young   5/19/2020   [facility] EMERGENCY DEPARTMENT     Janeth Milligan MD  05/19/20 5417

## 2020-05-19 NOTE — ED AVS SNAPSHOT
[facility] Emergency Department  [address]  [facility] [address]  Phone:  [phone]  Fax:  [phone]                                    Dariela Killian   MRN: [id number]    Department:  [facility] Emergency Department   Date of Visit:  5/19/2020           After Visit Summary Signature Page    I have received my discharge instructions, and my questions have been answered. I have discussed any challenges I see with this plan with the nurse or doctor.    ..........................................................................................................................................  Patient/Patient Representative Signature      ..........................................................................................................................................  Patient Representative Print Name and Relationship to Patient    ..................................................               ................................................  Date                                   Time    ..........................................................................................................................................  Reviewed by Signature/Title    ...................................................              ..............................................  Date                                               Time          22EPIC Rev 08/18

## 2020-05-19 NOTE — ED TRIAGE NOTES
Pt presents for complaint of right breast pain for x3 days. Seen at  and sent in for eval of a possible breast abcess. Pt states more painful over the three days and the swelling seems to be moving. ABC intact, A&Ox4.

## 2020-05-19 NOTE — ED NOTES
Pt provided with discharge paperwork and educated on recommended follow-up with OB. Pt educated on how to manage symptoms at home along with sign/symptoms to seek medical attention. Pt voiced understanding and denied any questions at discharge.

## 2020-05-19 NOTE — DISCHARGE INSTRUCTIONS
Call your OB tomorrow to discuss follow-up  Watch for worsening redness spreading signifcantly beyond area.   Warm compresses on area to avoid abscess developing again  Probiotics with antibiotics.

## 2020-05-22 LAB
BACTERIA SPEC CULT: ABNORMAL
Lab: ABNORMAL
SPECIMEN SOURCE: ABNORMAL
SPECIMEN SOURCE: ABNORMAL

## 2020-08-31 ENCOUNTER — PATIENT OUTREACH (OUTPATIENT)
Dept: OBGYN | Facility: CLINIC | Age: 45
End: 2020-08-31

## 2020-08-31 DIAGNOSIS — R87.612 PAPANICOLAOU SMEAR OF CERVIX WITH LOW GRADE SQUAMOUS INTRAEPITHELIAL LESION (LGSIL): ICD-10-CM

## 2020-11-18 ENCOUNTER — OFFICE VISIT (OUTPATIENT)
Dept: OBGYN | Facility: CLINIC | Age: 45
End: 2020-11-18
Payer: COMMERCIAL

## 2020-11-18 VITALS
SYSTOLIC BLOOD PRESSURE: 171 MMHG | DIASTOLIC BLOOD PRESSURE: 97 MMHG | OXYGEN SATURATION: 96 % | HEART RATE: 112 BPM | BODY MASS INDEX: 59.33 KG/M2 | WEIGHT: 293 LBS

## 2020-11-18 DIAGNOSIS — Z12.4 SCREENING FOR MALIGNANT NEOPLASM OF CERVIX: ICD-10-CM

## 2020-11-18 DIAGNOSIS — E66.3 OVERWEIGHT: ICD-10-CM

## 2020-11-18 DIAGNOSIS — Z01.411 ENCOUNTER FOR GYNECOLOGICAL EXAMINATION WITH ABNORMAL FINDING: Primary | ICD-10-CM

## 2020-11-18 DIAGNOSIS — R21 RASH AND NONSPECIFIC SKIN ERUPTION: ICD-10-CM

## 2020-11-18 PROCEDURE — G0124 SCREEN C/V THIN LAYER BY MD: HCPCS | Performed by: PATHOLOGY

## 2020-11-18 PROCEDURE — 88175 CYTOPATH C/V AUTO FLUID REDO: CPT | Performed by: OBSTETRICS & GYNECOLOGY

## 2020-11-18 PROCEDURE — 87624 HPV HI-RISK TYP POOLED RSLT: CPT | Performed by: OBSTETRICS & GYNECOLOGY

## 2020-11-18 PROCEDURE — 99213 OFFICE O/P EST LOW 20 MIN: CPT | Mod: 25 | Performed by: OBSTETRICS & GYNECOLOGY

## 2020-11-18 PROCEDURE — 99396 PREV VISIT EST AGE 40-64: CPT | Performed by: OBSTETRICS & GYNECOLOGY

## 2020-11-18 RX ORDER — CLOBETASOL PROPIONATE 0.5 MG/G
OINTMENT TOPICAL 2 TIMES DAILY
Qty: 45 G | Refills: 1 | Status: SHIPPED | OUTPATIENT
Start: 2020-11-18 | End: 2022-02-14

## 2020-11-18 ASSESSMENT — PATIENT HEALTH QUESTIONNAIRE - PHQ9
10. IF YOU CHECKED OFF ANY PROBLEMS, HOW DIFFICULT HAVE THESE PROBLEMS MADE IT FOR YOU TO DO YOUR WORK, TAKE CARE OF THINGS AT HOME, OR GET ALONG WITH OTHER PEOPLE: VERY DIFFICULT
SUM OF ALL RESPONSES TO PHQ QUESTIONS 1-9: 16
SUM OF ALL RESPONSES TO PHQ QUESTIONS 1-9: 16

## 2020-11-18 NOTE — PROGRESS NOTES
Dariela Killian is a 45 year old  female who presents for annual exam.  She has a long history of abnormal Pap, HPV, colposcopies.  She was evaluated at the [facility] by Dr. Kathie Quispe 9/10/2019.  Colposcopy was done, cervical biopsy and endocervical curettage were benign.  Repeat Pap and HPV at this time was recommended.  Dr. Antunez commented that Ms. Killian was likely a chronic carrier of HPV.    She was treated in May for a breast abscess, responded to antibiotics.  Mammogram was category 2, ultrasound of the left breast showed an abscess which was drained.  She notes no breast symptoms at this time, plans to continue yearly mammograms.    She reports increased anxiety, has gained weight, friends and family have suggested that she seek mental health evaluation.  .  She reports fleeting suicidal thoughts, but has no plan, nor intent to carry this out.  We discussed options, I have written a referral to the [facility] medical weight loss clinic, she will schedule a visit with LINDA Brown in our office.      She describes an irritating rash on her right torso, has been scratching this for relief.  She reports similar symptoms in the vulva.  Examination of the torso showed scattered erythema with some excoriation.  The vulva appeared slightly erythematous, with no discrete lesions.  Prescription for clobetasol will be sent to her pharmacy, she will follow-up with the [facility] in [place] if this treatment does not help.    In addition to the procedure, I spent 15 minutes with the patient, with more than 50% spent in counseling/discussing the above diagnosis and treatment options.       Menses are regular q 28-30 days and normal lasting 5-7 days.  Menses flow: normal and heavy.  Patient's last menstrual period was 10/28/2020 (approximate).. Using none for contraception.  She is not currently considering pregnancy.  Besides routine  health maintenance, she has no other health concerns today .  GYNECOLOGIC HISTORY:  Menarche: 12    Dariela is not sexually active with 0male partner(s) and is not currently in monogamous relationship with 0.    History sexually transmitted infections:No STD history  STI testing offered?  Declined  ROJAS exposure: No  History of abnormal Pap smear: YES - updated in Problem List and Health Maintenance accordingly  Family history of breast CA: No  Family history of uterine/ovarian CA: No    Family history of colon CA: No    HEALTH MAINTENANCE:  Cholesterol: (  Cholesterol   Date Value Ref Range Status   2010 223 (H) 0 - 200 mg/dL Final     Comment:     LDL Cholesterol is the primary guide to therapy.   The NCEP recommends further evaluation of: patients with cholesterol <200   mg/dL   if additional risk factors are present, cholesterol >240 mg/dL, triglycerides   >150 mg/dL, or HDL <40 mg/dL.   10/09/2007 198 0 - 200 mg/dL Final     Comment:     LDL Cholesterol is the primary guide to therapy: LDL-cholesterol goal in high   risk patients is <100 mg/dL and in very high risk patients is <70 mg/dL.   The NCEP recommends further evaluation of: patients with cholesterol <200 mg/dL   if additional risk factors are present, cholesterol >240 mg/dL, triglycerides   >150 mg/dL, or HDL <40 mg/dL.    History of abnormal lipids: No  Mammo: 2019 . History of abnormal Mammo: No.  Regular Self Breast Exams: Yes  Calcium/Vitamin D intake: source:  dairy, dietary supplement(s) Adequate? No  TSH: (  TSH   Date Value Ref Range Status   2010 2.96 0.4 - 5.0 mU/L Final    )  Pap; (  Lab Results   Component Value Date    PAP NIL 2019    PAP NIL 2018    PAP NIL 2017    )    HISTORY:  OB History    Para Term  AB Living   0 0 0 0 0 0   SAB TAB Ectopic Multiple Live Births   0 0 0 0 0     Past Medical History:   Diagnosis Date     Allergic rhinitis, cause unspecified     Allergic rhinitis - OTC using  claritin     ASCUS with positive high risk HPV 11/11, 6/12    + HPV 16 colp - WNL     Cervical high risk HPV (human papillomavirus) test positive 11/2013, 06/24/19    See problem list.      High risk HPV infection 5/2015, 01/12/17, 05/24/18    NIL pap, + HPV (not 16 or 18)     Hx of colposcopy with cervical biopsy 07/03/2015, 03/13/17    WNL, 03/13/17: [facility].      Obesity, unspecified     diet and exercising     Papanicolaou smear of cervix with low grade squamous intraepithelial lesion (LGSIL) 7/13/10, 3/18/11    DAKOTA I on colp, 12/11 colp WNl     Past Surgical History:   Procedure Laterality Date     TONSILLECTOMY  1988     Family History   Problem Relation Age of Onset     Allergies Mother      Allergies Brother      Neurologic Disorder Father         Parkinsons and ?dementia     Cancer Maternal Grandmother         stomach cancer      Bladder Cancer Brother      Social History     Socioeconomic History     Marital status: Single     Spouse name: Not on file     Number of children: Not on file     Years of education: Not on file     Highest education level: Not on file   Occupational History     Not on file   Social Needs     Financial resource strain: Not on file     Food insecurity     Worry: Not on file     Inability: Not on file     Transportation needs     Medical: Not on file     Non-medical: Not on file   Tobacco Use     Smoking status: Current Some Day Smoker     Years: 5.00     Smokeless tobacco: Never Used     Tobacco comment: ocassional - just socially going out   Substance and Sexual Activity     Alcohol use: Yes     Alcohol/week: 0.0 standard drinks     Comment: socially-minimal     Drug use: No     Sexual activity: Yes     Partners: Male   Lifestyle     Physical activity     Days per week: Not on file     Minutes per session: Not on file     Stress: Not on file   Relationships     Social connections     Talks on phone: Not on file     Gets together: Not on file     Attends Mandaeism service: Not on  file     Active member of club or organization: Not on file     Attends meetings of clubs or organizations: Not on file     Relationship status: Not on file     Intimate partner violence     Fear of current or ex partner: Not on file     Emotionally abused: Not on file     Physically abused: Not on file     Forced sexual activity: Not on file   Other Topics Concern     Parent/sibling w/ CABG, MI or angioplasty before 65F 55M? Not Asked   Social History Narrative     Not on file       Current Outpatient Medications:      MULTI-VITAMIN OR TABS, 1 daily, Disp: , Rfl: 0     ZYRTEC PO, None Entered, Disp: , Rfl:      norethindrone (MICRONOR) 0.35 MG per tablet, Take 1 tablet (0.35 mg) by mouth daily (Patient not taking: Reported on 6/24/2019), Disp: 84 tablet, Rfl: 3     triamcinolone (KENALOG) 0.025 % external ointment, Apply topically 2 times daily (Patient not taking: Reported on 9/10/2019), Disp: 15 g, Rfl: 1     Allergies   Allergen Reactions     Penicillins Hives     Seasonal Allergies        Past medical, surgical, social and family history were reviewed and updated in EPIC.    ROS:   C:     NEGATIVE for fever, chills, change in weight  I:       NEGATIVE for worrisome rashes, moles or lesions  E:     NEGATIVE for vision changes or irritation  E/M: NEGATIVE for ear, mouth and throat problems  R:     NEGATIVE for significant cough or SOB  CV:   NEGATIVE for chest pain, palpitations or peripheral edema  GI:     NEGATIVE for nausea, abdominal pain, heartburn, or change in bowel habits  :   NEGATIVE for frequency, dysuria, hematuria, vaginal discharge, or irregular bleeding  M:     NEGATIVE for significant arthralgias or myalgia  N:      NEGATIVE for weakness, dizziness or paresthesias  E:      NEGATIVE for temperature intolerance, skin/hair changes  P:      NEGATIVE for changes in mood or affect.    EXAM:  BP (!) 171/97   Pulse 112   Wt 142.4 kg (314 lb)   LMP 10/28/2020 (Approximate)   SpO2 96%   Breastfeeding  No   BMI 59.33 kg/m     BMI: Body mass index is 59.33 kg/m .  Constitutional: healthy, alert and no distress  Head: Normocephalic. No masses, lesions, tenderness or abnormalities  Neck: Neck supple. Trachea midline. No adenopathy. Thyroid symmetric, normal size.   Cardiovascular: RRR.   Respiratory: Negative.   Breast: No nodularity, asymmetry or nipple discharge bilaterally.  Gastrointestinal: Abdomen soft, non-tender, non-distended. No masses, organomegaly.  :  Vulva: Mild erythema, no discrete lesions/ulceration, normal female hair distribution, no inguinal adenopathy.    Urethra:  Midline, non-tender, well supported, no discharge  Vagina:  Moist, pink, no abnormal discharge, no lesions  Uterus:  Normal size, non-tender, freely mobile  Ovaries:  No masses appreciated, non-tender, mobile  Rectal Exam: deferred  Musculoskeletal: extremities normal  Skin: no suspicious lesions or rashes  Psychiatric: Affect appropriate, cooperative,mentation appears normal.     COUNSELING:      reports that she has been smoking. She has smoked for the past 5.00 years. She has never used smokeless tobacco.    Body mass index is 59.33 kg/m .  Weight management plan: Referral to medical weight loss clinic  FRAX Risk Assessment    ASSESSMENT:  45 year old female with satisfactory annual exam  (Z12.4) Screening for malignant neoplasm of cervix  (primary encounter diagnosis)  Comment:   Plan: Pap imaged thin layer screen with HPV -         recommended age 30 - 65 years (select HPV order        below), HPV High Risk Types DNA Cervical            (Z01.411) Encounter for gynecological examination with abnormal finding  Comment:   Plan:     (R21) Rash and nonspecific skin eruption  Comment:   Plan: clobetasol (TEMOVATE) 0.05 % external ointment            (E66.3) Overweight  Comment:   Plan: COMPREHENSIVE WEIGHT MANAGEMENT            Answers for HPI/ROS submitted by the patient on 11/18/2020   If you checked off any problems, how difficult  have these problems made it for you to do your work, take care of things at home, or get along with other people?: Very difficult  PHQ9 TOTAL SCORE: 16

## 2020-11-19 ASSESSMENT — PATIENT HEALTH QUESTIONNAIRE - PHQ9: SUM OF ALL RESPONSES TO PHQ QUESTIONS 1-9: 16

## 2020-11-23 LAB
COPATH REPORT: ABNORMAL
PAP: ABNORMAL

## 2020-11-25 ENCOUNTER — PATIENT OUTREACH (OUTPATIENT)
Dept: OBGYN | Facility: CLINIC | Age: 45
End: 2020-11-25

## 2020-11-25 NOTE — TELEPHONE ENCOUNTER
7/13/10: Pap - LSIL. Plan colp.  8/13/10: [facility] - DAKOTA I. Plan pap in 6 months.  3/8/11: Pap - LSIL. Plan pap in 6 months.  4/18/11: [facility] - DAKOTA I . Plan pap in 6 months.  11/18/11: ASCUS, + HPV 16 & 82  12/15/11: [facility] - WNL. Plan pap in 6 months.  6/18/12: ASCUS, + HPV 16.   7/19/12: [facility] - WNL. Plan pap in 6 months.  3/2013: NIL pap. Plan pap in 6 months.  11/2013: NIL , + HPV 16.   1/14:[facility]:WNL. Plan cotest pap & HPV in 1 year.   4/2015 Lost to f/u  5/14/15: NIL pap, + HPV (not 16 or 18)  7/3/15: [facility]-WNL, plan pap & HPV cotest in 1 year. Tracking updated.  01/12/17: NIL pap, + HR HPV (not 16 or 18) result. Plan [place] per provider, due by 04/12/17.  03/13/17: [facility] WNL. Plan cotest in 1 year per provider.  05/24/18: NIL Pap, + HR HPV (not 16 or 18) result. Plan cotest in 1 year per provider.   06/24/19: NIL Pap, + HR HPV (not 16 or 18) result. Plan: Refer to Dr. Quispe, cervical dysplasia clinic.  09/10/19: [facility] Bx and ECC Neg for Dysplasia. Plan cotest in 1 year with regular provider.   8/31/20 Reminder letter/My Chart sent--Read  10/06 20 Patient is lost to pap tracking follow-up. FYI routed to provider.  11/18/20 ASCUS pap, + HR HPV (not 16 or 18). Plan [facility] due bef 02/18/21.

## 2020-12-24 ENCOUNTER — TELEPHONE (OUTPATIENT)
Dept: OBGYN | Facility: CLINIC | Age: 45
End: 2020-12-24

## 2020-12-24 NOTE — TELEPHONE ENCOUNTER
Reason for Call:  Other appointment    Detailed comments: Pt is requesting a call back to schedule a colposcopy. Thank you.    Phone Number Patient can be reached at: Home number on file [phone] (home)    Best Time: Any    Can we leave a detailed message on this number? YES    Call taken on 12/24/2020 at 11:07 AM by Flory Roberts

## 2021-02-15 ENCOUNTER — OFFICE VISIT (OUTPATIENT)
Dept: OBGYN | Facility: CLINIC | Age: 46
End: 2021-02-15
Payer: COMMERCIAL

## 2021-02-15 VITALS
OXYGEN SATURATION: 96 % | SYSTOLIC BLOOD PRESSURE: 158 MMHG | DIASTOLIC BLOOD PRESSURE: 95 MMHG | BODY MASS INDEX: 61.79 KG/M2 | HEART RATE: 114 BPM | WEIGHT: 293 LBS

## 2021-02-15 DIAGNOSIS — R87.612 PAPANICOLAOU SMEAR OF CERVIX WITH LOW GRADE SQUAMOUS INTRAEPITHELIAL LESION (LGSIL): Primary | ICD-10-CM

## 2021-02-15 DIAGNOSIS — R87.610 ASCUS WITH POSITIVE HIGH RISK HPV CERVICAL: ICD-10-CM

## 2021-02-15 DIAGNOSIS — R87.810 ASCUS WITH POSITIVE HIGH RISK HPV CERVICAL: ICD-10-CM

## 2021-02-15 LAB — HCG UR QL: NEGATIVE

## 2021-02-15 PROCEDURE — 88305 TISSUE EXAM BY PATHOLOGIST: CPT | Performed by: PATHOLOGY

## 2021-02-15 PROCEDURE — 57454 BX/CURETT OF CERVIX W/SCOPE: CPT | Performed by: OBSTETRICS & GYNECOLOGY

## 2021-02-15 PROCEDURE — 81025 URINE PREGNANCY TEST: CPT | Performed by: OBSTETRICS & GYNECOLOGY

## 2021-02-15 NOTE — PROGRESS NOTES
INDICATION: Dariela Killian is a 45 year old female who presents for colposcopy.  Pap smear was reported as ASCUS other HPV +.  See prior GYN notes.      Informed consent obtained for procedure.               COLPOSCOPIC EXAM:  Cervix is fully visualized.  Squamocolumnar junction is fully visualized.      Using standard technique and acetic acid application, the cervix and vagina were examined using the colposcope.  The transformation zone appeared abnormal, with minimal white epithelium most prominent to the left, and biopsy of 3:00 was sent.     ECC was performed.   Monsels applied for hemostasis.    Colposcopic Impression:   Normal/Low grade.        PLAN: Post Colposcopy Instructions were given.  Call in 1 week for biopsy results.  Will advise followup depending on results.

## 2021-02-15 NOTE — PATIENT INSTRUCTIONS

## 2021-02-17 ENCOUNTER — PATIENT OUTREACH (OUTPATIENT)
Dept: OBGYN | Facility: CLINIC | Age: 46
End: 2021-02-17

## 2021-02-17 LAB — COPATH REPORT: NORMAL

## 2021-02-17 NOTE — TELEPHONE ENCOUNTER
02/15/21 [place] Bx DAKOTA 1 ECC neg for dysplasia. Plan [place] and cotest in 1 year due 02/15/22.

## 2021-08-28 ENCOUNTER — HEALTH MAINTENANCE LETTER (OUTPATIENT)
Age: 46
End: 2021-08-28

## 2021-10-23 ENCOUNTER — HEALTH MAINTENANCE LETTER (OUTPATIENT)
Age: 46
End: 2021-10-23

## 2021-12-18 ENCOUNTER — HEALTH MAINTENANCE LETTER (OUTPATIENT)
Age: 46
End: 2021-12-18

## 2022-01-31 ENCOUNTER — PATIENT OUTREACH (OUTPATIENT)
Dept: OBGYN | Facility: CLINIC | Age: 47
End: 2022-01-31
Payer: COMMERCIAL

## 2022-02-14 ENCOUNTER — OFFICE VISIT (OUTPATIENT)
Dept: OBGYN | Facility: CLINIC | Age: 47
End: 2022-02-14
Payer: COMMERCIAL

## 2022-02-14 VITALS
WEIGHT: 293 LBS | HEIGHT: 62 IN | DIASTOLIC BLOOD PRESSURE: 88 MMHG | SYSTOLIC BLOOD PRESSURE: 132 MMHG | BODY MASS INDEX: 53.92 KG/M2

## 2022-02-14 DIAGNOSIS — N87.0 CIN I (CERVICAL INTRAEPITHELIAL NEOPLASIA I): ICD-10-CM

## 2022-02-14 DIAGNOSIS — N93.9 ABNORMAL UTERINE BLEEDING (AUB): Primary | ICD-10-CM

## 2022-02-14 PROBLEM — E11.9 DIABETES MELLITUS, TYPE 2 (H): Status: ACTIVE | Noted: 2022-02-14

## 2022-02-14 LAB
ERYTHROCYTE [DISTWIDTH] IN BLOOD BY AUTOMATED COUNT: 14.2 % (ref 10–15)
FSH SERPL-ACNC: 7.8 IU/L
HBA1C MFR BLD: 7.3 % (ref 0–5.6)
HCG UR QL: NEGATIVE
HCT VFR BLD AUTO: 41.2 % (ref 35–47)
HGB BLD-MCNC: 13.8 G/DL (ref 11.7–15.7)
MCH RBC QN AUTO: 27.7 PG (ref 26.5–33)
MCHC RBC AUTO-ENTMCNC: 33.5 G/DL (ref 31.5–36.5)
MCV RBC AUTO: 83 FL (ref 78–100)
PLATELET # BLD AUTO: 322 10E3/UL (ref 150–450)
RBC # BLD AUTO: 4.99 10E6/UL (ref 3.8–5.2)
WBC # BLD AUTO: 11.6 10E3/UL (ref 4–11)

## 2022-02-14 PROCEDURE — 87624 HPV HI-RISK TYP POOLED RSLT: CPT | Performed by: OBSTETRICS & GYNECOLOGY

## 2022-02-14 PROCEDURE — 85027 COMPLETE CBC AUTOMATED: CPT | Performed by: OBSTETRICS & GYNECOLOGY

## 2022-02-14 PROCEDURE — 88305 TISSUE EXAM BY PATHOLOGIST: CPT | Performed by: PATHOLOGY

## 2022-02-14 PROCEDURE — G0145 SCR C/V CYTO,THINLAYER,RESCR: HCPCS | Performed by: OBSTETRICS & GYNECOLOGY

## 2022-02-14 PROCEDURE — 83036 HEMOGLOBIN GLYCOSYLATED A1C: CPT | Performed by: OBSTETRICS & GYNECOLOGY

## 2022-02-14 PROCEDURE — 83001 ASSAY OF GONADOTROPIN (FSH): CPT | Performed by: OBSTETRICS & GYNECOLOGY

## 2022-02-14 PROCEDURE — 99214 OFFICE O/P EST MOD 30 MIN: CPT | Mod: 25 | Performed by: OBSTETRICS & GYNECOLOGY

## 2022-02-14 PROCEDURE — 36415 COLL VENOUS BLD VENIPUNCTURE: CPT | Performed by: OBSTETRICS & GYNECOLOGY

## 2022-02-14 PROCEDURE — G0124 SCREEN C/V THIN LAYER BY MD: HCPCS | Performed by: PATHOLOGY

## 2022-02-14 PROCEDURE — 58100 BIOPSY OF UTERUS LINING: CPT | Performed by: OBSTETRICS & GYNECOLOGY

## 2022-02-14 PROCEDURE — 81025 URINE PREGNANCY TEST: CPT | Performed by: OBSTETRICS & GYNECOLOGY

## 2022-02-14 PROCEDURE — 84443 ASSAY THYROID STIM HORMONE: CPT | Performed by: OBSTETRICS & GYNECOLOGY

## 2022-02-14 PROCEDURE — 80061 LIPID PANEL: CPT | Performed by: OBSTETRICS & GYNECOLOGY

## 2022-02-14 ASSESSMENT — MIFFLIN-ST. JEOR: SCORE: 2089.45

## 2022-02-14 NOTE — PROGRESS NOTES
GYN Progress Note     CC: Abnormal uterine bleeding     HISTORY OF PRESENT ILLNESS:    Dariela Killian is a 46 year old  who presents for evaluation of abnormal uterine bleeding. She reports that over the last few years her periods have gotten heavier and more painful. She will typically have to change a super tampon or heavy duty pad every 1-2 hours and often has severe cramping and passing large clots. Bleeding typically lasts for 5-7 days. Periods regular, denies intermenstrual spotting.        OB HISTORY:  OB History    Para Term  AB Living   0 0 0 0 0 0   SAB IAB Ectopic Multiple Live Births   0 0 0 0 0          GYN HISTORY:  Menarche: [age]  Age at first intercourse: 21 Number of lifetime partners: >5  Dariela is not sexually active with male partner(s) and is not currently in monogamous relationship.    History sexually transmitted infections:HPV  STI testing offered?  Declined  ROJAS exposure: Unknown  History of abnormal Pap smear: YES - updated in Problem List and Health Maintenance accordingly     PAST MEDICAL HISTORY:  Past Medical History:   Diagnosis Date     Abnormal Pap smear of cervix     ,,  , 20     Allergic rhinitis, cause unspecified     Allergic rhinitis - OTC using claritin     High risk HPV infection     , 2015, 17, 18, ,      Hx of colposcopy with cervical biopsy 2015, 17    WNL, 17: [facility].      Obesity, unspecified     diet and exercising            PAST SURGICAL HISTORY:  Past Surgical History:   Procedure Laterality Date     TONSILLECTOMY            CURRENT MEDICATIONS:   Current Outpatient Medications   Medication Sig Dispense Refill     MULTI-VITAMIN OR TABS 1 daily  0     ZYRTEC PO None Entered              ALLERGIES:  Pcn [penicillins] and Seasonal allergies         SOCIAL HISTORY:  Social History     Tobacco Use     Smoking status: Current Some Day Smoker     Years: 5.00     Smokeless tobacco:  "Never Used     Tobacco comment: ocassional - just socially going out   Substance Use Topics     Alcohol use: Yes     Alcohol/week: 0.0 standard drinks     Comment: socially-minimal     Drug use: No            FAMILY HISTORY:  Family History   Problem Relation Age of Onset     Allergies Mother      Other - See Comments Mother         had a possible lumpectomy     Neurologic Disorder Father         Parkinsons and ?dementia     Allergies Brother      Bladder Cancer Brother      Cancer Maternal Grandmother         stomach cancer             REVIEW OF SYSTEMS:  See HPI      PHYSICAL EXAMINATION:  VS:Ht 1.562 m (5' 1.5\")   Wt 149.9 kg (330 lb 8 oz)   LMP 01/31/2022 (Exact Date)   BMI 61.44 kg/m    Body mass index is 61.44 kg/m .    General: Patient alert and oriented, no acute distress  CV: no peripheral edema or cyanosis  Resp: normal respiratory effort and equal lung expansion  Abdomen: non-tender to light and deep palpation, no masses, organomegaly or hernia  : normal external genitalia without lesions. Urethral meatus normal, urethra and bladder non-tender to palpation. Vaginal mucosa normal in appearance without lesions, scant physiologic discharge. Cervix appears grossly normal.  Bimanual exam limited due to body habitus.   Ext: non-tender, no edema    Endometrial Biopsy Procedure    After obtaining written consent from the patient and performing a time out a speculum was placed in the vagina. The cervix was visualized and prepped with betadine swabs. A tenaculum was used to grasp the cervix at the 12 o'clock position and a Pipelle was attempted to be passed. A os finder was used to dilate the cervical os. The uterus sounded to 5cm. multiple passes were made and adequate tissue was obtained. The tenaculum was removed from the cervix which was noted to be hemostatic. The patient tolerated the procedure well. EBL minimal.      Laboratory values:    Imaging findings:        ASSESSMENT:  Dariela Killian is a 46 " year old  who presents for evaluation of abnormal uterine bleeding       PLAN:  (N93.9) Abnormal uterine bleeding (AUB)  (primary encounter diagnosis)  -We reviewed etiologies for abnormal uterine bleeding including structural abnormalities like polyps and fibroids, anovulation, uterine hyperplasia or malignancy   -Endometrial biopsy performed to further evaluate for hyperplasia or malignancy   -Pelvic US ordered to evaluate for other structural abnormalities   -CBC, TSH and FSH obtained     (N87.0) DAKOTA I (cervical intraepithelial neoplasia I)  -Repeat pap smear with HPV obtained     Dispo: pending above evaluation   Yas Paredes MD

## 2022-02-14 NOTE — PATIENT INSTRUCTIONS
Endometrial Biopsy Post-Procedure Patient Instructions    Please call your healthcare provider if you have any of the following symptoms:    Fever--Temperature greater than 100 degrees    Cramping after 48 hours    Bleeding heavier than a normal period in the first 24-48 hours    If you are bleeding and soaking more than one pad an hour    Or any other worrisome problems.    We recommend that:    You use pads, not tampons for the bleeding.    You may resume sexual activity in 2-3 days, or after bleeding stops.    You may use Tylenol or ibuprofen (Motrin or Advil) for any discomfort.      [facility] - OB/GYN : [phone]

## 2022-02-14 NOTE — PROGRESS NOTES
Dariela is a 46 year old  female who presents for annual exam.     Menses are regular q 28-30 days and heavy lasting 5-7 days.  Menses flow: heavy and with clots.  Patient's last menstrual period was 2022 (exact date).. Using none for contraception.  She is not currently considering pregnancy.  Besides routine health maintenance,  she would like to discuss very heavy periods.  GYNECOLOGIC HISTORY:  Menarche: [age]  Age at first intercourse: 21 Number of lifetime partners: >5  Dariela is not sexually active with male partner(s) and is not currently in monogamous relationship.    History sexually transmitted infections:HPV  STI testing offered?  Declined  ROJAS exposure: Unknown  History of abnormal Pap smear: YES - updated in Problem List and Health Maintenance accordingly  Family history of breast CA: No  Family history of uterine/ovarian CA: No    Family history of colon CA: Unknown    HEALTH MAINTENANCE:  Cholesterol: (  Cholesterol   Date Value Ref Range Status   2010 223 (H) 0 - 200 mg/dL Final     Comment:     LDL Cholesterol is the primary guide to therapy.   The NCEP recommends further evaluation of: patients with cholesterol <200   mg/dL   if additional risk factors are present, cholesterol >240 mg/dL, triglycerides   >150 mg/dL, or HDL <40 mg/dL.   10/09/2007 198 0 - 200 mg/dL Final     Comment:     LDL Cholesterol is the primary guide to therapy: LDL-cholesterol goal in high   risk patients is <100 mg/dL and in very high risk patients is <70 mg/dL.   The NCEP recommends further evaluation of: patients with cholesterol <200 mg/dL   if additional risk factors are present, cholesterol >240 mg/dL, triglycerides   >150 mg/dL, or HDL <40 mg/dL.    History of abnormal lipids: Yes  Mammo: 2020 . History of abnormal Mammo: YES, negative biopsy.  Regular Self Breast Exams: occ  Calcium/Vitamin D intake: source:  dairy, dietary supplement(s) Adequate? Yes  TSH: (  TSH   Date Value Ref Range  Status   2010 2.96 0.4 - 5.0 mU/L Final    )  Pap; (  Lab Results   Component Value Date    PAP ASC-US 2020    PAP NIL 2019    PAP NIL 2018    )    HISTORY:  OB History    Para Term  AB Living   0 0 0 0 0 0   SAB IAB Ectopic Multiple Live Births   0 0 0 0 0     Past Medical History:   Diagnosis Date     Abnormal Pap smear of cervix     ,,  , 21     Allergic rhinitis, cause unspecified     Allergic rhinitis - OTC using claritin     High risk HPV infection     , 2015, 18, 19, ,      Hx of colposcopy with cervical biopsy 2015, 18    WNL, 17: [place] WNL.      Obesity, unspecified     diet and exercising     Past Surgical History:   Procedure Laterality Date     TONSILLECTOMY  1988     Family History   Problem Relation Age of Onset     Allergies Mother      Neurologic Disorder Father         Parkinsons and ?dementia     Allergies Brother      Bladder Cancer Brother      Cancer Maternal Grandmother         stomach cancer      Social History     Socioeconomic History     Marital status: Single     Spouse name: None     Number of children: None     Years of education: None     Highest education level: None   Occupational History     None   Tobacco Use     Smoking status: Current Some Day Smoker     Years: 5.00     Smokeless tobacco: Never Used     Tobacco comment: ocassional - just socially going out   Substance and Sexual Activity     Alcohol use: Yes     Alcohol/week: 0.0 standard drinks     Comment: socially-minimal     Drug use: No     Sexual activity: Not Currently     Partners: Male   Other Topics Concern     Parent/sibling w/ CABG, MI or angioplasty before 65F 55M? Not Asked   Social History Narrative     None     Social Determinants of Health     Financial Resource Strain: Not on file   Food Insecurity: Not on file   Transportation Needs: Not on file   Physical Activity: Not on file   Stress: Not on file   Social  "Connections: Not on file   Intimate Partner Violence: Not on file   Housing Stability: Not on file       Current Outpatient Medications:      MULTI-VITAMIN OR TABS, 1 daily, Disp: , Rfl: 0     ZYRTEC PO, None Entered, Disp: , Rfl:      Allergies   Allergen Reactions     Pcn [Penicillins] Hives     Seasonal Allergies        Past medical, surgical, social and family history were reviewed and updated in EPIC.    ROS:   C:     NEGATIVE for fever, chills, change in weight  I:       NEGATIVE for worrisome rashes, moles or lesions  E:     NEGATIVE for vision changes or irritation  E/M: NEGATIVE for ear, mouth and throat problems  R:     NEGATIVE for significant cough or SOB  CV:   NEGATIVE for chest pain, palpitations or peripheral edema  GI:     NEGATIVE for nausea, abdominal pain, heartburn, or change in bowel habits  :   NEGATIVE for frequency, dysuria, hematuria, vaginal discharge, or irregular bleeding  M:     NEGATIVE for significant arthralgias or myalgia  N:      NEGATIVE for weakness, dizziness or paresthesias  E:      NEGATIVE for temperature intolerance, skin/hair changes  P:      NEGATIVE for changes in mood or affect.    EXAM:  Ht 1.562 m (5' 1.5\")   Wt 149.9 kg (330 lb 8 oz)   LMP 01/31/2022 (Exact Date)   BMI 61.44 kg/m     BMI: Body mass index is 61.44 kg/m .  Constitutional: healthy, alert and no distress  Head: Normocephalic. No masses, lesions, tenderness or abnormalities  Neck: Neck supple. Trachea midline. No adenopathy. Thyroid symmetric, normal size.   Cardiovascular: RRR.   Respiratory: Negative.   Breast: {GYN Breast:993484}  Gastrointestinal: Abdomen soft, non-tender, non-distended. No masses, organomegaly.  :  Vulva:  No external lesions, normal female hair distribution, no inguinal adenopathy.    Urethra:  Midline, non-tender, well supported, no discharge  Vagina:  Moist, pink, no abnormal discharge, no lesions  Uterus:  Normal size, *** , non-tender, freely mobile  Ovaries:  No masses " "appreciated, non-tender, mobile  Rectal Exam: {RECTAL EXAM:013043::\"deferred\"}  Musculoskeletal: extremities normal  Skin: no suspicious lesions or rashes  Psychiatric: Affect appropriate, cooperative,mentation appears normal.     COUNSELING:   {FEMALE COUNSELING MESSAGES:049889::\"Reviewed preventive health counseling, as reflected in patient instructions\"}   reports that she has been smoking. She has smoked for the past 5.00 years. She has never used smokeless tobacco.  {Tobacco Cessation -- Delete if patient is a non-smoker:336012}  Body mass index is 61.44 kg/m .  {Obesity Action Plan -- Delete if BMA < 25:866830}  FRAX Risk Assessment    ASSESSMENT:  46 year old female with satisfactory annual exam  {DIAG PICKLIST:383986}    "

## 2022-02-15 DIAGNOSIS — E66.813 CLASS 3 SEVERE OBESITY WITH SERIOUS COMORBIDITY AND BODY MASS INDEX (BMI) OF 60.0 TO 69.9 IN ADULT, UNSPECIFIED OBESITY TYPE (H): ICD-10-CM

## 2022-02-15 DIAGNOSIS — E66.01 CLASS 3 SEVERE OBESITY WITH SERIOUS COMORBIDITY AND BODY MASS INDEX (BMI) OF 60.0 TO 69.9 IN ADULT, UNSPECIFIED OBESITY TYPE (H): ICD-10-CM

## 2022-02-15 DIAGNOSIS — E11.9 TYPE 2 DIABETES MELLITUS WITHOUT COMPLICATION, WITHOUT LONG-TERM CURRENT USE OF INSULIN (H): Primary | ICD-10-CM

## 2022-02-15 LAB
CHOLEST SERPL-MCNC: 233 MG/DL
FASTING STATUS PATIENT QL REPORTED: NO
HDLC SERPL-MCNC: 49 MG/DL
LDLC SERPL CALC-MCNC: 149 MG/DL
NONHDLC SERPL-MCNC: 184 MG/DL
TRIGL SERPL-MCNC: 174 MG/DL
TSH SERPL DL<=0.005 MIU/L-ACNC: 1.51 MU/L (ref 0.4–4)

## 2022-02-17 LAB
BKR LAB AP GYN ADEQUACY: ABNORMAL
BKR LAB AP GYN INTERPRETATION: ABNORMAL
BKR LAB AP GYN OTHER FINDINGS: ABNORMAL
BKR LAB AP HPV REFLEX: ABNORMAL
BKR LAB AP LMP: ABNORMAL
BKR LAB AP PREVIOUS ABNORMAL: ABNORMAL
PATH REPORT.COMMENTS IMP SPEC: ABNORMAL
PATH REPORT.COMMENTS IMP SPEC: ABNORMAL
PATH REPORT.RELEVANT HX SPEC: ABNORMAL

## 2022-02-18 LAB
PATH REPORT.COMMENTS IMP SPEC: NORMAL
PATH REPORT.COMMENTS IMP SPEC: NORMAL
PATH REPORT.FINAL DX SPEC: NORMAL
PATH REPORT.GROSS SPEC: NORMAL
PATH REPORT.MICROSCOPIC SPEC OTHER STN: NORMAL
PATH REPORT.RELEVANT HX SPEC: NORMAL
PHOTO IMAGE: NORMAL

## 2022-02-21 LAB
HUMAN PAPILLOMA VIRUS 16 DNA: NEGATIVE
HUMAN PAPILLOMA VIRUS 18 DNA: NEGATIVE
HUMAN PAPILLOMA VIRUS FINAL DIAGNOSIS: NORMAL
HUMAN PAPILLOMA VIRUS OTHER HR: NEGATIVE

## 2022-03-01 ENCOUNTER — OFFICE VISIT (OUTPATIENT)
Dept: FAMILY MEDICINE | Facility: CLINIC | Age: 47
End: 2022-03-01
Payer: COMMERCIAL

## 2022-03-01 VITALS
RESPIRATION RATE: 16 BRPM | SYSTOLIC BLOOD PRESSURE: 167 MMHG | HEIGHT: 62 IN | TEMPERATURE: 97.3 F | OXYGEN SATURATION: 97 % | WEIGHT: 293 LBS | BODY MASS INDEX: 53.92 KG/M2 | HEART RATE: 95 BPM | DIASTOLIC BLOOD PRESSURE: 98 MMHG

## 2022-03-01 DIAGNOSIS — E11.9 TYPE 2 DIABETES MELLITUS WITHOUT COMPLICATION, WITHOUT LONG-TERM CURRENT USE OF INSULIN (H): Primary | ICD-10-CM

## 2022-03-01 DIAGNOSIS — F32.A DEPRESSION, UNSPECIFIED DEPRESSION TYPE: ICD-10-CM

## 2022-03-01 DIAGNOSIS — R06.09 DOE (DYSPNEA ON EXERTION): ICD-10-CM

## 2022-03-01 DIAGNOSIS — I10 PRIMARY HYPERTENSION: ICD-10-CM

## 2022-03-01 DIAGNOSIS — D72.829 LEUKOCYTOSIS, UNSPECIFIED TYPE: ICD-10-CM

## 2022-03-01 DIAGNOSIS — Z12.11 SCREEN FOR COLON CANCER: ICD-10-CM

## 2022-03-01 DIAGNOSIS — E66.01 MORBID OBESITY (H): ICD-10-CM

## 2022-03-01 DIAGNOSIS — E78.5 DYSLIPIDEMIA: ICD-10-CM

## 2022-03-01 DIAGNOSIS — R06.83 SNORING: ICD-10-CM

## 2022-03-01 LAB
BASOPHILS # BLD AUTO: 0.1 10E3/UL (ref 0–0.2)
BASOPHILS NFR BLD AUTO: 0 %
EOSINOPHIL # BLD AUTO: 0.5 10E3/UL (ref 0–0.7)
EOSINOPHIL NFR BLD AUTO: 4 %
ERYTHROCYTE [DISTWIDTH] IN BLOOD BY AUTOMATED COUNT: 14.1 % (ref 10–15)
HCT VFR BLD AUTO: 43 % (ref 35–47)
HGB BLD-MCNC: 14.2 G/DL (ref 11.7–15.7)
LYMPHOCYTES # BLD AUTO: 3.4 10E3/UL (ref 0.8–5.3)
LYMPHOCYTES NFR BLD AUTO: 26 %
MCH RBC QN AUTO: 27.8 PG (ref 26.5–33)
MCHC RBC AUTO-ENTMCNC: 33 G/DL (ref 31.5–36.5)
MCV RBC AUTO: 84 FL (ref 78–100)
MONOCYTES # BLD AUTO: 0.9 10E3/UL (ref 0–1.3)
MONOCYTES NFR BLD AUTO: 7 %
NEUTROPHILS # BLD AUTO: 8.2 10E3/UL (ref 1.6–8.3)
NEUTROPHILS NFR BLD AUTO: 63 %
PLATELET # BLD AUTO: 282 10E3/UL (ref 150–450)
RBC # BLD AUTO: 5.1 10E6/UL (ref 3.8–5.2)
WBC # BLD AUTO: 13 10E3/UL (ref 4–11)

## 2022-03-01 PROCEDURE — 86341 ISLET CELL ANTIBODY: CPT | Mod: 90 | Performed by: INTERNAL MEDICINE

## 2022-03-01 PROCEDURE — 99205 OFFICE O/P NEW HI 60 MIN: CPT | Performed by: INTERNAL MEDICINE

## 2022-03-01 PROCEDURE — 80053 COMPREHEN METABOLIC PANEL: CPT | Performed by: INTERNAL MEDICINE

## 2022-03-01 PROCEDURE — 99000 SPECIMEN HANDLING OFFICE-LAB: CPT | Performed by: INTERNAL MEDICINE

## 2022-03-01 PROCEDURE — 82274 ASSAY TEST FOR BLOOD FECAL: CPT | Performed by: INTERNAL MEDICINE

## 2022-03-01 PROCEDURE — 84681 ASSAY OF C-PEPTIDE: CPT | Performed by: INTERNAL MEDICINE

## 2022-03-01 PROCEDURE — 85025 COMPLETE CBC W/AUTO DIFF WBC: CPT | Performed by: INTERNAL MEDICINE

## 2022-03-01 PROCEDURE — 82043 UR ALBUMIN QUANTITATIVE: CPT | Performed by: INTERNAL MEDICINE

## 2022-03-01 PROCEDURE — 36415 COLL VENOUS BLD VENIPUNCTURE: CPT | Performed by: INTERNAL MEDICINE

## 2022-03-01 RX ORDER — LOSARTAN POTASSIUM 50 MG/1
25 TABLET ORAL DAILY
Qty: 45 TABLET | Refills: 0 | Status: SHIPPED | OUTPATIENT
Start: 2022-03-01 | End: 2022-03-30

## 2022-03-01 RX ORDER — ATORVASTATIN CALCIUM 20 MG/1
20 TABLET, FILM COATED ORAL DAILY
Qty: 90 TABLET | Refills: 0 | Status: SHIPPED | OUTPATIENT
Start: 2022-03-01 | End: 2022-03-30

## 2022-03-01 ASSESSMENT — PATIENT HEALTH QUESTIONNAIRE - PHQ9: SUM OF ALL RESPONSES TO PHQ QUESTIONS 1-9: 13

## 2022-03-01 ASSESSMENT — PAIN SCALES - GENERAL: PAINLEVEL: NO PAIN (0)

## 2022-03-01 NOTE — PROGRESS NOTES
Assessment & Plan   Problem List Items Addressed This Visit        Digestive    Morbid obesity (H)    Relevant Medications    metFORMIN (GLUCOPHAGE) 500 MG tablet       Endocrine    Diabetes mellitus, type 2 (H) - Primary    Relevant Medications    metFORMIN (GLUCOPHAGE) 500 MG tablet    Other Relevant Orders    Albumin Random Urine Quantitative with Creat Ratio (Completed)    Comprehensive metabolic panel (BMP + Alb, Alk Phos, ALT, AST, Total. Bili, TP) (Completed)    AMB Adult Diabetes Educator Referral    Glutamic acid decarboxylase antibody (Completed)    C-peptide (Completed)      Other Visit Diagnoses     Screen for colon cancer        Relevant Orders    Fecal colorectal cancer screen FIT - Future (S+30) (Completed)    Leukocytosis, unspecified type        Relevant Orders    CBC with platelets and differential (Completed)    Snoring        Relevant Orders    Adult Sleep Eval & Management  Referral    Dyslipidemia        Relevant Medications    atorvastatin (LIPITOR) 20 MG tablet    Primary hypertension        Relevant Medications    losartan (COZAAR) 50 MG tablet    Other Relevant Orders    Echocardiogram Complete    POLK (dyspnea on exertion)        Relevant Orders    Echocardiogram Complete    Depression, unspecified depression type        Relevant Orders    Adult Mental Health  Referral           Discussed starting on medications for diabetes start Metformin will check C-peptide and ISAAC antibodies.  Refer to diabetic educator.  Repeat A1c in 6 to 8 weeks.  Blood pressure elevated would recommend starting on an ARB start with low-dose and increase if well tolerated 50 mg daily.  Referral to mental health counseling, disucssed starting on medication SSRI can increase risk of weight gain, Wellbutrin would not start given her high blood pressure.  We will check a baseline echocardiogram rule out diastolic heart dysfunction.  Start on statin discussed side effects.  She is in agreement to do  "screening for colon cancer.  Counseled on smoking cessation, she has cut down smoking to 1 to 2 cigarettes a day.  Patient has scheduled with weight management program.  Continue with lifestyle changes exercise activities as tolerated.  Sleep medicine referral as well due to history of intermittent snoring rule out obstructive sleep apnea.  Schedule follow-up in 3 months, continue follow-up with GYN.       Tobacco Cessation:   reports that she has been smoking. She has smoked for the past 5.00 years. She has never used smokeless tobacco.  Tobacco Cessation Action Plan: Self help information given to patient    BMI:   Estimated body mass index is 61.34 kg/m  as calculated from the following:    Height as of this encounter: 1.562 m (5' 1.5\").    Weight as of this encounter: 149.7 kg (330 lb).   Weight management plan: Discussed healthy diet and exercise guidelines    Depression Screening Follow Up    PHQ 2/28/2022   PHQ-9 Total Score 13   Q9: Thoughts of better off dead/self-harm past 2 weeks Not at all     Last PHQ-9 2/28/2022   1.  Little interest or pleasure in doing things 1   2.  Feeling down, depressed, or hopeless 1   3.  Trouble falling or staying asleep, or sleeping too much 3   4.  Feeling tired or having little energy 1   5.  Poor appetite or overeating 3   6.  Feeling bad about yourself 2   7.  Trouble concentrating 2   8.  Moving slowly or restless 0   Q9: Thoughts of better off dead/self-harm past 2 weeks 0   PHQ-9 Total Score 13   Difficulty at work, home, or with people (No Data)       Follow Up Actions Taken  Crisis resource information provided in After Visit Summary  Mental Health Referral placed     CONSULTATION/REFERRAL to diabetic education, mental health counseling.  And sleep medicine  Work on weight loss  Regular exercise  See Patient Instructions    Return in about 3 months (around 6/1/2022), or if symptoms worsen or fail to improve, for As needed and if symptoms worsen, Physical " "Exam.    Crystal Holbrook MD  [facility]  Total time spent was 60 minutes review of records exam and recommendations addressing multiple health concerns  Subjective   Dariela is a 46 year old who presents for the following health issues     History of Present Illness       Diabetes:   She presents for follow up of diabetes.  She is not checking blood glucose. She is concerned about other. She is having weight gain. The patient has not had a diabetic eye exam in the last 12 months.         She eats 0-1 servings of fruits and vegetables daily.She consumes 0 sweetened beverage(s) daily.She exercises with enough effort to increase her heart rate 20 to 29 minutes per day.  She exercises with enough effort to increase her heart rate 3 or less days per week.   She is taking medications regularly.       Patient presenting to establish care and discuss chronic medical problems.  She does have history of elevated blood pressure readings on multiple visits to the clinic, she describes on moderate severe exertion she does get short winded, she climbs 3 floors of stairs at her work.  Has history of intermittent snoring not known if she has apnea.  She was recently diagnosed with diabetes.  Denies any tingling numbness.  Denies any blurry vision.  Denies any history of chest pain.  She does have history of chronic depression; denies any suicidal ideations.    Review of Systems   Constitutional, HEENT, cardiovascular, pulmonary, GI, , musculoskeletal, neuro, skin, endocrine and psych systems are negative, except as otherwise noted.      Objective    BP (!) 167/98   Pulse 95   Temp 97.3  F (36.3  C) (Temporal)   Resp 16   Ht 1.562 m (5' 1.5\")   Wt 149.7 kg (330 lb)   LMP 01/31/2022 (Exact Date)   SpO2 97%   BMI 61.34 kg/m    Body mass index is 61.34 kg/m .  Physical Exam   GENERAL: healthy, alert and no distress  GENERAL: obese  EYES: Eyes grossly normal to inspection, PERRL and conjunctivae and sclerae " normal  NECK: no adenopathy, no asymmetry, masses, or scars and thyroid normal to palpation  RESP: lungs clear to auscultation - no rales, rhonchi or wheezes  CV: regular rate and rhythm, normal S1 S2, no S3 or S4, no murmur, click or rub, no peripheral edema and peripheral pulses strong  ABDOMEN: soft, nontender, no hepatosplenomegaly, no masses and bowel sounds normal  MS: no gross musculoskeletal defects noted, no edema  SKIN: no suspicious lesions or rashes  NEURO: Normal strength and tone, mentation intact and speech normal  PSYCH: mentation appears normal, affect normal/bright, appears slightly emotional talking about her depression symptoms.    Office Visit on 02/14/2022   Component Date Value Ref Range Status     WBC Count 02/14/2022 11.6 (A) 4.0 - 11.0 10e3/uL Final     RBC Count 02/14/2022 4.99  3.80 - 5.20 10e6/uL Final     Hemoglobin 02/14/2022 13.8  11.7 - 15.7 g/dL Final     Hematocrit 02/14/2022 41.2  35.0 - 47.0 % Final     MCV 02/14/2022 83  78 - 100 fL Final     MCH 02/14/2022 27.7  26.5 - 33.0 pg Final     MCHC 02/14/2022 33.5  31.5 - 36.5 g/dL Final     RDW 02/14/2022 14.2  10.0 - 15.0 % Final     Platelet Count 02/14/2022 322  150 - 450 10e3/uL Final     TSH 02/14/2022 1.51  0.40 - 4.00 mU/L Final     FSH 02/14/2022 7.8  IU/L Final    FEMALE:   Age                         0 to 7 days: 3.4 U/L or less   8 to 15 days: 1.0 U/L or less  16 days to 10 years: 0.3-6.9 U/L  11 years: 0.4-9.0 U/L   12 years: 1.0-17.2 U/L   13 years: 1.8-9.9 U/L   14 to 16 years: 0.9-12.4 U/L   17 years: 1.2-9.6 U/L     Premenopausal, 18 and older:   Follicular: 2.5-10.2 U/L  Mid-cycle: 3.4-33.4 U/L  Luteal: 1.5-9.1 U/L  Postmenopausal: 23.0-116.3 U/L    Female Domenico Stages   Stage I: 0.4-6.7 IU/L   Stage II: 0.5-8.7 IU/L   Stage III: 1.2-11.4 IU/L   Stage IV: 0.7-12.8 IU/L   Stage V: 1.0-11.6 IU/L     Puberty onset (transition from Domenico Stage I to Domenico Stage II) occurs for girls at a median age of 10.5 years.    There is evidence that it may occur up to 1 year earlier in obese girls and in  girls.   Progression through Domenico stages is variable. Domenico Stage V (adult) should be reached by age 18.      Hemoglobin A1C 02/14/2022 7.3 (A) 0.0 - 5.6 % Final    Normal <5.7%   Prediabetes 5.7-6.4%    Diabetes 6.5% or higher     Note: Adopted from ADA consensus guidelines.     Cholesterol 02/14/2022 233 (A) <200 mg/dL Final     Triglycerides 02/14/2022 174 (A) <150 mg/dL Final     Direct Measure HDL 02/14/2022 49 (A) >=50 mg/dL Final     LDL Cholesterol Calculated 02/14/2022 149 (A) <=100 mg/dL Final     Non HDL Cholesterol 02/14/2022 184 (A) <130 mg/dL Final     Patient Fasting > 8hrs? 02/14/2022 No   Final     hCG Urine Qualitative 02/14/2022 Negative  Negative Final    This test is for screening purposes.  Results should be interpreted along with the clinical picture.  Confirmation testing is available if warranted by ordering [id number], HCG Quantitative Pregnancy.     Interpretation 02/14/2022 Negative for Intraepithelial Lesion or Malignancy (NILM)    Final     Other Findings 02/14/2022 Endometrial cells present in a woman 45 years of age or older. Endometrial evaluation is recommended in postmenopausal women.  (A) Endometrial cells in a woman >=45 years of age; endometrial cells correlate with menstrual history provided, Trichomonas vaginalis, Fungal organisms morphologically consistent with Candida spp, Shift in vaginal duane suggestive of bacterial vaginosis,... Final    This result contains rich text formatting which cannot be displayed here.     Comment 02/14/2022    Final                    Value:This result contains rich text formatting which cannot be displayed here.     Specimen Adequacy 02/14/2022 Satisfactory for evaluation, endocervical/transformation zone component present   Final     Clinical Information 02/14/2022    Final                    Value:This result contains rich text formatting which  cannot be displayed here.     LMP/Menopause Date 02/14/2022    Final                    Value:This result contains rich text formatting which cannot be displayed here.     Reflex Testing 02/14/2022 Yes regardless of result   Final     Previous Abnormal? 02/14/2022    Final                    Value:This result contains rich text formatting which cannot be displayed here.     Performing Labs 02/14/2022    Final                    Value:This result contains rich text formatting which cannot be displayed here.     Case Report 02/14/2022    Final                    Value:Surgical Pathology Report                         Case: [id number]                                  Authorizing Provider:  Yas Paredes MD     Collected:           02/14/2022 04:55 PM          Ordering Location:     [facility]   Received:            02/14/2022 05:43 PM                                 [place]                                                                    Pathologist:           Naomie Colby MD                                                          Specimen:    Uterine Contents, EMB                                                                       Final Diagnosis 02/14/2022    Final                    Value:This result contains rich text formatting which cannot be displayed here.     Clinical Information 02/14/2022    Final                    Value:This result contains rich text formatting which cannot be displayed here.     Gross Description 02/14/2022    Final                    Value:This result contains rich text formatting which cannot be displayed here.     Microscopic Description 02/14/2022    Final                    Value:This result contains rich text formatting which cannot be displayed here.     Performing Labs 02/14/2022    Final                    Value:This result contains rich text formatting which cannot be displayed here.     Other HR HPV 02/14/2022 Negative  Negative Final     HPV16  DNA 02/14/2022 Negative  Negative Final     HPV18 DNA 02/14/2022 Negative  Negative Final     FINAL DIAGNOSIS 02/14/2022    Final                    Value:This result contains rich text formatting which cannot be displayed here.

## 2022-03-02 LAB
ALBUMIN SERPL-MCNC: 3.6 G/DL (ref 3.4–5)
ALP SERPL-CCNC: 85 U/L (ref 40–150)
ALT SERPL W P-5'-P-CCNC: 79 U/L (ref 0–50)
ANION GAP SERPL CALCULATED.3IONS-SCNC: 10 MMOL/L (ref 3–14)
AST SERPL W P-5'-P-CCNC: 51 U/L (ref 0–45)
BILIRUB SERPL-MCNC: 0.3 MG/DL (ref 0.2–1.3)
BUN SERPL-MCNC: 11 MG/DL (ref 7–30)
C PEPTIDE SERPL-MCNC: 6.2 NG/ML (ref 0.9–6.9)
CALCIUM SERPL-MCNC: 9 MG/DL (ref 8.5–10.1)
CHLORIDE BLD-SCNC: 110 MMOL/L (ref 94–109)
CO2 SERPL-SCNC: 19 MMOL/L (ref 20–32)
CREAT SERPL-MCNC: 0.61 MG/DL (ref 0.52–1.04)
CREAT UR-MCNC: 309 MG/DL
GFR SERPL CREATININE-BSD FRML MDRD: >90 ML/MIN/1.73M2
GLUCOSE BLD-MCNC: 132 MG/DL (ref 70–99)
MICROALBUMIN UR-MCNC: 48 MG/L
MICROALBUMIN/CREAT UR: 15.53 MG/G CR (ref 0–25)
POTASSIUM BLD-SCNC: 4 MMOL/L (ref 3.4–5.3)
PROT SERPL-MCNC: 7.4 G/DL (ref 6.8–8.8)
SODIUM SERPL-SCNC: 139 MMOL/L (ref 133–144)

## 2022-03-03 ENCOUNTER — TELEPHONE (OUTPATIENT)
Dept: FAMILY MEDICINE | Facility: CLINIC | Age: 47
End: 2022-03-03
Payer: COMMERCIAL

## 2022-03-03 DIAGNOSIS — R79.89 ELEVATED LFTS: Primary | ICD-10-CM

## 2022-03-03 DIAGNOSIS — E87.20 ACIDOSIS: ICD-10-CM

## 2022-03-03 NOTE — TELEPHONE ENCOUNTER
Patient Contact    Attempt # 1    Was call answered?  No.  Left message on voicemail with information to call back.    Jessie MCCORD, Triage RN  [facility]

## 2022-03-03 NOTE — TELEPHONE ENCOUNTER
----- Message from Crystal Holbrook MD sent at 3/3/2022 10:44 AM CST -----  Dariela, reviewed the rest of your labsUrine microalbumin which is a test for protein in the urine is negative, which is reassuring,C-peptide is normal suggest you do not have insulin deficiency, probable type 2 diabetes.Chemistry shows normal electrolytes of sodium potassium but low carbon dioxide of 19 suggestive of mild acidosis in the blood, please increase fluid intake,Kidney function test is normal which is reassuring, by increasing fluid you will help to correct acidosis in blood, we will need to repeat your labs again in couple days to make sure the CO2 has corrected.You have some persistent elevation of liver enzymes called AST ALT, I would request ultrasound of the liver,Calcium level is normal,Protein and albumin is normal,Please advise if you agree to see hematology, I can place an electronic consult with a blood specialist and see their recommendation about the elevated white blood cell count that is chronic, probably due to chronic smoking in my opinion.Please increase your fluid in the next 2 to 3 days and please repeat your chemistry by lab to make sure the CO2 is corrected was 19, normal is above 20 and preferably the mid 20s.  Please see you would need to call the lab to schedule an appointment in the next 2 to 3 days.Please need to do an ultrasound of the liver to make sure the cause of the  elevated liver enzymes, which most likely is suggestive of fatty liver;I will place an order for an ultrasound of the liver, please you would need to call and schedule with radiology departmentPlease schedule follow-up with this provider in 2 months.Please call us with blood pressure readings.Any further questions please let me knowDr. Holbrook

## 2022-03-03 NOTE — TELEPHONE ENCOUNTER
Pt called back and Dr. Holbrook's message was relayed to her, see below. She says she is actually leaving work now and heading to the airport. She will be out of town until next Tuesday is it OK to wait for these labs, what do you advise? She was given scheduling number for ultrasound order. F/u in 2 months was also scheduled. She does not currently have a BP monitor at home so she will be getting one to check and monitor BP at home. Routing to provider.     Please call pt back at: [phone]  Ok to leave detailed vm     Christine CAMPBELL RN  [facility]

## 2022-03-03 NOTE — TELEPHONE ENCOUNTER
Please advise to keep hydrated/Push more fluids for now, her CO2 on labs was slightly low, possible erroneous lab error  Will need repeat BMP ASAP.  Thank you

## 2022-03-03 NOTE — TELEPHONE ENCOUNTER
Attempt #2. Left message for the patient to call back and ask for triage.     Leyla Willams RN  [facility]

## 2022-03-03 NOTE — RESULT ENCOUNTER NOTE
Dariela, reviewed the rest of your labsUrine microalbumin which is a test for protein in the urine is negative, which is reassuring,C-peptide is normal suggest you do not have insulin deficiency, probable type 2 diabetes.Chemistry shows normal electrolytes of sodium potassium but low carbon dioxide of 19 suggestive of mild acidosis in the blood, please increase fluid intake,Kidney function test is normal which is reassuring, by increasing fluid you will help to correct acidosis in blood, we will need to repeat your labs again in couple days to make sure the CO2 has corrected.You have some persistent elevation of liver enzymes called AST ALT, I would request ultrasound of the liver,Calcium level is normal,Protein and albumin is normal,Please advise if you agree to see hematology, I can place an electronic consult with a blood specialist and see their recommendation about the elevated white blood cell count that is chronic, probably due to chronic smoking in my opinion.Please increase your fluid in the next 2 to 3 days and please repeat your chemistry by lab to make sure the CO2 is corrected was 19, normal is above 20 and preferably the mid 20s.  Please see you would need to call the lab to schedule an appointment in the next 2 to 3 days.Please need to do an ultrasound of the liver to make sure the cause of the  elevated liver enzymes, which most likely is suggestive of fatty liver;I will place an order for an ultrasound of the liver, please you would need to call and schedule with radiology departmentPlease schedule follow-up with this provider in 2 months.Please call us with blood pressure readings.Any further questions please let me knowDr. Holbrook

## 2022-03-04 ENCOUNTER — TELEPHONE (OUTPATIENT)
Dept: FAMILY MEDICINE | Facility: CLINIC | Age: 47
End: 2022-03-04
Payer: COMMERCIAL

## 2022-03-04 LAB — HEMOCCULT STL QL IA: NEGATIVE

## 2022-03-04 NOTE — TELEPHONE ENCOUNTER
Diabetes Education Scheduling Outreach #1:    Call to patient to schedule. Left message with phone number to call to schedule.    Plan for 2nd outreach attempt within 1 week.    Jessie Mccray  [place] [facility]  Diabetes and Nutrition Scheduling

## 2022-03-04 NOTE — TELEPHONE ENCOUNTER
Left a detailed voice message for patients with providers message. Asked her to call clinic back for lab visit ASAP or ask to speak to a triage nurse if further questions.

## 2022-03-05 LAB — GAD65 AB SER IA-ACNC: <5 IU/ML

## 2022-03-08 NOTE — TELEPHONE ENCOUNTER
Diabetes Education Scheduling Outreach #2:    BYTEGR[employer] message sent to patient requesting to call to schedule.    [facility]  Diabetes and Nutrition Scheduling

## 2022-03-11 ENCOUNTER — LAB (OUTPATIENT)
Dept: LAB | Facility: CLINIC | Age: 47
End: 2022-03-11
Payer: COMMERCIAL

## 2022-03-11 DIAGNOSIS — Z11.4 SCREENING FOR HIV (HUMAN IMMUNODEFICIENCY VIRUS): ICD-10-CM

## 2022-03-11 DIAGNOSIS — R79.89 ELEVATED LFTS: ICD-10-CM

## 2022-03-11 DIAGNOSIS — Z11.59 NEED FOR HEPATITIS C SCREENING TEST: ICD-10-CM

## 2022-03-11 DIAGNOSIS — E87.20 ACIDOSIS: ICD-10-CM

## 2022-03-11 PROCEDURE — 84460 ALANINE AMINO (ALT) (SGPT): CPT

## 2022-03-11 PROCEDURE — 86803 HEPATITIS C AB TEST: CPT

## 2022-03-11 PROCEDURE — 87389 HIV-1 AG W/HIV-1&-2 AB AG IA: CPT

## 2022-03-11 PROCEDURE — 84450 TRANSFERASE (AST) (SGOT): CPT

## 2022-03-11 PROCEDURE — 36415 COLL VENOUS BLD VENIPUNCTURE: CPT

## 2022-03-11 PROCEDURE — 80048 BASIC METABOLIC PNL TOTAL CA: CPT

## 2022-03-12 LAB
ALT SERPL W P-5'-P-CCNC: 100 U/L (ref 0–50)
ANION GAP SERPL CALCULATED.3IONS-SCNC: 9 MMOL/L (ref 3–14)
AST SERPL W P-5'-P-CCNC: 66 U/L (ref 0–45)
BUN SERPL-MCNC: 9 MG/DL (ref 7–30)
CALCIUM SERPL-MCNC: 10 MG/DL (ref 8.5–10.1)
CHLORIDE BLD-SCNC: 105 MMOL/L (ref 94–109)
CO2 SERPL-SCNC: 24 MMOL/L (ref 20–32)
CREAT SERPL-MCNC: 0.62 MG/DL (ref 0.52–1.04)
GFR SERPL CREATININE-BSD FRML MDRD: >90 ML/MIN/1.73M2
GLUCOSE BLD-MCNC: 113 MG/DL (ref 70–99)
POTASSIUM BLD-SCNC: 3.9 MMOL/L (ref 3.4–5.3)
SODIUM SERPL-SCNC: 138 MMOL/L (ref 133–144)

## 2022-03-14 LAB
HCV AB SERPL QL IA: NONREACTIVE
HIV 1+2 AB+HIV1 P24 AG SERPL QL IA: NONREACTIVE

## 2022-03-15 ENCOUNTER — MYC MEDICAL ADVICE (OUTPATIENT)
Dept: FAMILY MEDICINE | Facility: CLINIC | Age: 47
End: 2022-03-15
Payer: COMMERCIAL

## 2022-03-15 ENCOUNTER — TELEPHONE (OUTPATIENT)
Dept: FAMILY MEDICINE | Facility: CLINIC | Age: 47
End: 2022-03-15
Payer: COMMERCIAL

## 2022-03-15 NOTE — RESULT ENCOUNTER NOTE
Advise LFT (liver enzymes) are persistently elevated  US liver pending,   Advise follow up with GI/ liver specialist; if agrees will place referral due to persistent elevated LFT  Chemistry shows corrected acidosis, CO2 24 , normal was 19  Normal kidney function test  HIV/ hep C both non reactive, negative

## 2022-03-15 NOTE — TELEPHONE ENCOUNTER
Patient Contact    Attempt # 1    Was call answered?  No.  Left message on voicemail with information to call back or review MyChart. Sent message     Pt saw results on MyChart but need to verify if she is agreeable to GI/liver specialist     Jessie MCCORD Triage RN  [facility]       Crystal Holbrook MD   3/14/2022 10:01 PM CDT Back to Top        Advise LFT (liver enzymes) are persistently elevated  US liver pending,   Advise follow up with GI/ liver specialist; if agrees will place referral due to persistent elevated LFT  Chemistry shows corrected acidosis, CO2 24 , normal was 19  Normal kidney function test  HIV/ hep C both non reactive, negative        Seen

## 2022-03-16 NOTE — TELEPHONE ENCOUNTER
Patient responded to mychart- see encounter zurdo 3/15/22.    Alfie Sinha RN  [facility]

## 2022-03-17 ENCOUNTER — HOSPITAL ENCOUNTER (OUTPATIENT)
Dept: ULTRASOUND IMAGING | Facility: CLINIC | Age: 47
Discharge: HOME | End: 2022-03-17
Attending: INTERNAL MEDICINE | Admitting: INTERNAL MEDICINE
Payer: COMMERCIAL

## 2022-03-17 DIAGNOSIS — R79.89 ELEVATED LFTS: ICD-10-CM

## 2022-03-17 PROCEDURE — 76705 ECHO EXAM OF ABDOMEN: CPT

## 2022-03-19 NOTE — RESULT ENCOUNTER NOTE
Please advise patient.  Ultrasound of the liver shows at least moderate fatty infiltration of the liver and there was a questionable steatohepatitis as per the radiology report which is inflammation of the liver.  I would recommend patient sees GI/liver specialist.

## 2022-03-22 DIAGNOSIS — K76.0 FATTY LIVER: Primary | ICD-10-CM

## 2022-03-28 DIAGNOSIS — E78.5 DYSLIPIDEMIA: ICD-10-CM

## 2022-03-28 DIAGNOSIS — E11.9 TYPE 2 DIABETES MELLITUS WITHOUT COMPLICATION, WITHOUT LONG-TERM CURRENT USE OF INSULIN (H): ICD-10-CM

## 2022-03-28 DIAGNOSIS — I10 PRIMARY HYPERTENSION: ICD-10-CM

## 2022-03-28 NOTE — TELEPHONE ENCOUNTER
LOV 3/1/22 with Dr Holbrook      Future Office Visit:   Next 5 appointments (look out 90 days)    Mar 31, 2022  7:30 AM  Screening Mammogram with LEO  [facility] [place] ([facility] ) [address], [address]  [place] [facility] [address]  [phone]   Apr 05, 2022 12:30 PM  Telephone Visit with Zahra Koch RD  [facility] [place] ([facility] ) [address]  [facility] [address]  [phone]   May 04, 2022  7:00 AM  (Arrive by 6:45 AM)  Provider Visit with Crystal Holbrook MD  [facility] ([facility] ) [address], [address]  [facility] [address]  [phone]

## 2022-03-29 ENCOUNTER — ANCILLARY PROCEDURE (OUTPATIENT)
Dept: ULTRASOUND IMAGING | Facility: CLINIC | Age: 47
End: 2022-03-29
Attending: OBSTETRICS & GYNECOLOGY
Payer: COMMERCIAL

## 2022-03-29 ENCOUNTER — VIRTUAL VISIT (OUTPATIENT)
Dept: OBGYN | Facility: CLINIC | Age: 47
End: 2022-03-29
Attending: OBSTETRICS & GYNECOLOGY
Payer: COMMERCIAL

## 2022-03-29 DIAGNOSIS — N93.9 ABNORMAL UTERINE BLEEDING (AUB): Primary | ICD-10-CM

## 2022-03-29 DIAGNOSIS — N93.9 ABNORMAL UTERINE BLEEDING (AUB): ICD-10-CM

## 2022-03-29 DIAGNOSIS — N94.89 ADNEXAL MASS: ICD-10-CM

## 2022-03-29 PROCEDURE — 76856 US EXAM PELVIC COMPLETE: CPT | Performed by: OBSTETRICS & GYNECOLOGY

## 2022-03-29 PROCEDURE — 76830 TRANSVAGINAL US NON-OB: CPT | Performed by: OBSTETRICS & GYNECOLOGY

## 2022-03-29 PROCEDURE — 99213 OFFICE O/P EST LOW 20 MIN: CPT | Mod: TEL | Performed by: OBSTETRICS & GYNECOLOGY

## 2022-03-29 NOTE — PROGRESS NOTES
Dariela is a 46 year old who is being evaluated via a billable telephone visit.      What phone number would you like to be contacted at? [phone]  How would you like to obtain your AVS? Wildbryan Lyle is a 46 year old who presents for the following health issues : Heavy menstrual bleeding. She was seen in the office on 2022 and at that time she reported 5-7 days of heavy bleeding, severe cramping and blood clots. Her hemoglobin was normal and a EMB done that day was negative for malignancy/hyperplasia. She had a pelvic US today and presents for follow up.        Objective         Gynecological Ultrasound Report  Pelvic U/S - Transabdominal and Transvaginal   [facility]  Referring Provider: Yas Paredes MD   Sonographer:  Erica Abad RDMS  Indication: Bleeding/Menses- Abnormal uterine bleeding (AUB)  LMP: No LMP recorded. (Menstrual status: Irregular Periods).     Gynecological Ultrasonography:   Uterus: anteverted. Contour is irregular w/ myomata: 1 Fundal 2.9 x 2.5 x 2.7 cm.  Size: 8.7 x 5.3 x 4.3 cm  Endometrium: Thickness Total 14.5 mm  Findings: thickened and heterogeneous     Right Ovary:  Not visualized   Left adnexa: Large Simple cyst in LLQ = 10.5 x 10.1 x 11.2 cm unable to see transvaginally  Cul de Sac Free Fluid: No free fluid     Impression:  Uterus contains 2.9cm fundal fibroid.  Endometrium measures 14.5mm and is heterogenous in appearance.     Large simple cyst in left lower quadrant.  Unable to discern source of this cyst.    Right ovary could not be visualized.  Consider MRI for further characterization of left lower quadrant cyst.     Rosa Smith MD    Assessment and Plan:   Dariela Killian is a 45 YO  who presents for follow up for AUB, also with a left adnexal mass on US     (N93.9) Abnormal uterine bleeding (AUB)  (primary encounter diagnosis)  -Discussed etiologies of AUB, unclear if fundal fibroid  contributing to bleeding.   -We reviewed risks/benefits of surgical and medical management and reviewed options including Lysteda, Provera/Norethindrone, and the Levonorgestrel IUD as well as endometrial ablation and hysterectomy.   -She thinks that she would want to try the Mirena IUD, patient information given and patient will call to schedule IUD placement     (N94.89) Adnexal mass  -Most likely a simple left ovarian cyst but unable to fully declineate the origin of the adnexal mass so pelvic MRI was ordered to further evaluate   -Reviewed warning signs of ovarian torsion     Dispo: F/U for IUD placement and to review MRI results         Phone call duration: 16 minutes

## 2022-03-30 RX ORDER — LOSARTAN POTASSIUM 50 MG/1
50 TABLET ORAL DAILY
Qty: 45 TABLET | Refills: 0 | Status: SHIPPED | OUTPATIENT
Start: 2022-03-30 | End: 2022-05-10

## 2022-03-30 RX ORDER — ATORVASTATIN CALCIUM 20 MG/1
20 TABLET, FILM COATED ORAL DAILY
Qty: 90 TABLET | Refills: 0 | Status: SHIPPED | OUTPATIENT
Start: 2022-03-30 | End: 2022-07-17

## 2022-03-30 NOTE — TELEPHONE ENCOUNTER
Routing refill request to provider for review/approval because:  Drug not on the [facility] refill protocol .     Angiotensin-II Receptors Failed 03/28/2022 05:04 PM   Protocol Details  Last blood pressure under 140/90 in past 12 months       BP Readings from Last 3 Encounters:   03/01/22 (!) 167/98   02/14/22 132/88   02/15/21 (!) 158/95        Leyla Willams RN  [facility] Triage

## 2022-03-30 NOTE — TELEPHONE ENCOUNTER
Prescription approved per [facility] Refill Protocol.  Leyla Willams RN  [facility]

## 2022-03-30 NOTE — TELEPHONE ENCOUNTER
Please check if patient has been taking full tablet dose and what are her BP readings, thank you for follow up

## 2022-03-31 ENCOUNTER — MYC MEDICAL ADVICE (OUTPATIENT)
Dept: FAMILY MEDICINE | Facility: CLINIC | Age: 47
End: 2022-03-31

## 2022-03-31 ENCOUNTER — ANCILLARY PROCEDURE (OUTPATIENT)
Dept: MAMMOGRAPHY | Facility: CLINIC | Age: 47
End: 2022-03-31
Attending: OBSTETRICS & GYNECOLOGY
Payer: COMMERCIAL

## 2022-03-31 DIAGNOSIS — Z12.31 VISIT FOR SCREENING MAMMOGRAM: ICD-10-CM

## 2022-03-31 PROCEDURE — 77067 SCR MAMMO BI INCL CAD: CPT | Mod: TC | Performed by: RADIOLOGY

## 2022-03-31 PROCEDURE — 77063 BREAST TOMOSYNTHESIS BI: CPT | Mod: TC | Performed by: RADIOLOGY

## 2022-03-31 NOTE — TELEPHONE ENCOUNTER
Sent [employer] message to patient     Jessie MCCORD, Triage RN  [facility]

## 2022-04-04 NOTE — TELEPHONE ENCOUNTER
Patient viewed and responded to mychart.   See my chart encounter on 3/31/22.  Last read by Dariela Killina at 9:12 AM on 4/2/2022.    Alfie Sinha RN  [facility]

## 2022-04-05 ENCOUNTER — VIRTUAL VISIT (OUTPATIENT)
Dept: EDUCATION SERVICES | Facility: CLINIC | Age: 47
End: 2022-04-05
Attending: INTERNAL MEDICINE
Payer: COMMERCIAL

## 2022-04-05 DIAGNOSIS — E11.9 TYPE 2 DIABETES MELLITUS WITHOUT COMPLICATION, WITHOUT LONG-TERM CURRENT USE OF INSULIN (H): ICD-10-CM

## 2022-04-05 PROCEDURE — G0108 DIAB MANAGE TRN  PER INDIV: HCPCS | Mod: GT | Performed by: DIETITIAN, REGISTERED

## 2022-04-05 NOTE — PATIENT INSTRUCTIONS
Check blood glucose once per day. Can check before breakfast (goal is  mg/dL) or could check 2 hours after dinner some days (goal is < 180 mg/dL).     ROSTR    Diabetes Support Resources:  Websites: American Diabetes Association: www.diabetes.org     Bring blood glucose meter and logbook with you to all doctor and follow-up appointments.    Diabetes Education Telephone Visit Follow-up:    We realize your time is valuable and your health is important! We offer a convenient Telephone Visit follow up! It s a quick way to check in for a medication dose adjustment without having to come back to clinic as soon.    Telephone Visits are often covered by insurance. Please check with your insurance plan to see if this type of visit is covered. If not, the cost is less expensive than an office visit:      Up to 10 minutes (Code 71168): $30    11-20 minutes (Code 56264): $59    More than 20 minutes (Code 33582): $85    Talk with your Diabetes Educator if you want to learn more.      [place] [facility]:  For Your Diabetes Education and Nutrition Appointments Call:  [phone]   For Diabetes Education or Nutrition Related Questions:   Phone: [phone]  Send [employer] Message   If you need a medication refill please contact your pharmacy. Please allow 3 business days for your refills to be completed.

## 2022-04-05 NOTE — TELEPHONE ENCOUNTER
BP readings seem to be at goal, email 3 readings every couple weeks. Unless are elevated >140/90.   
Blood pressure looks better. 135/87 in hypertensive range.   
FYI see below update from patient     Jessie MCCORD, Triage RN  [facility]     
See additional readings     Pt asking if you want her to continue sending BPs updates?     Jessie MCCORD, Triage RN  [facility]     
See below update with BP readings     Jessie MCCORD, Triage RN  [facility]     
Sent message from Dr. Holbrook to patient via [employer]. Also abstracted most recent patient reported bp.   Deepika Hill MA    
Skin normal color for race, warm, dry and intact. No evidence of rash.

## 2022-04-05 NOTE — LETTER
4/5/2022         RE: Dariela Killian  [address]  [address]  [place] [address]        Dear Colleague,    Thank you for referring your patient, Dariela Killian, to the [facility]. Please see a copy of my visit note below.      Type of Service: Telephone Visit    Originating Location (Patient Location): Home  Distant Location (Provider Location): Home  Mode of Communication:  Telephone    Telephone Visit Start Time: 12:30 pm  Telephone Visit End Time (telephone visit stop time): 1:15 pm    How would patient like to obtain AVS? Ashley   Diabetes Self-Management Education & Support    Presents for: Initial Assessment for new diagnosis    Type of Visit: Telephone Visit    How would patient like to obtain AVS? Ashley    ASSESSMENT:  Dariela has made numerous positive changes to her diet since her dx 1-2 months ago. She has reduced her eating out significantly and is cooking at home for most meals.  Is having less carbs and bought a new cookbook for diabetes.   She is scared to eat though.  Would benefit from adding in some carbs and being a bit more liberal with her diet. Focused on changes that are maintainable.     She is tolerating the metformin and would benefit from testing her BG at home a bit to see how her numbers are.     Patient's most recent   Lab Results   Component Value Date    A1C 7.3 02/14/2022    is not meeting goal of <7.0    Diabetes knowledge and skills assessment:   Patient is knowledgeable in diabetes management concepts related to: new dx  Continue education with the following diabetes management concepts: Healthy Eating, Being Active, Monitoring, Taking Medication, Problem Solving, Reducing Risks and Healthy Coping  Based on learning assessment above, most appropriate setting for further diabetes education would be: Group class or Individual setting.    INTERVENTIONS:    Education provided today on:  AADE Self-Care Behaviors:  Diabetes  Pathophysiology  Healthy Eating: carbohydrate counting, consistency in amount, composition, and timing of food intake, weight reduction and portion control  Being Active: relationship to blood glucose  Monitoring: purpose, proper technique, individual blood glucose targets, frequency of monitoring and proper sharps disposal  Taking Medication: action of prescribed medication and side effects of prescribed medications  Reducing Risks: A1C - goals, relating to blood glucose levels, how often to check    Opportunities for ongoing education and support in diabetes-self management were discussed. Pt verbalized understanding of concepts discussed and recommendations provided today.       Education Materials Provided:  [facility] Understanding Diabetes Booklet will be mailed    PLAN  Order placed for BGM and supplies to her pharmacy.   She will check BG once daily. Goal before meals  mg/dL.   Aim for 30-45 grams of carb per meal.   Continue to cook mostly at home and can check out websites provided in her AVS if she needs more ideas for meals/recipes.   Keep working on weight loss.     Topics to cover at upcoming visits: Problem Solving, Reducing Risks and Healthy Coping  Follow-up: 5/17 scheduled    See Goals Section for co-developed, patient-stated behavior change goals.  AVS provided to patient today.      SUBJECTIVE / OBJECTIVE:  Presents for: Initial Assessment for new diagnosis  Accompanied by: Self  Diabetes education in the past 24mo: No  Diabetes type: Type 2  Date of diagnosis: newly dx  How confident are you filling out medical forms by yourself:: Not Assessed  Diabetes management related comments/concerns: No big questions. Scared to eat anything right now.  Difficulty affording diabetes medication?: No  Other concerns:: None  Cultural Influences/Ethnic Background:  Other    Diabetes Symptoms & Complications:  Weight trend: Stable  Complications assessed today?: Yes  Autonomic neuropathy: No  CVA:  "No  Heart disease: No  Nephropathy: No  Peripheral neuropathy: No  Peripheral Vascular Disease: No  Retinopathy: No    Patient Problem List and Family Medical History reviewed for relevant medical history, current medical status, and diabetes risk factors.    Vitals:  There were no vitals taken for this visit.  Estimated body mass index is 61.34 kg/m  as calculated from the following:    Height as of 3/1/22: 1.562 m (5' 1.5\").    Weight as of 3/1/22: 149.7 kg (330 lb).   Last 3 BP:   BP Readings from Last 3 Encounters:   03/01/22 (!) 167/98   02/14/22 132/88   02/15/21 (!) 158/95       History   Smoking Status     Current Some Day Smoker     Years: 5.00   Smokeless Tobacco     Never Used     Comment: 3/2022 at 1-2 cigs per day & cutting down       Labs:  Lab Results   Component Value Date    A1C 7.3 02/14/2022     Lab Results   Component Value Date     03/11/2022    GLC 98 07/13/2010     Lab Results   Component Value Date     02/14/2022     07/13/2010     HDL Cholesterol   Date Value Ref Range Status   07/13/2010 53 50 - 110 mg/dL Final     Direct Measure HDL   Date Value Ref Range Status   02/14/2022 49 (L) >=50 mg/dL Final   ]  GFR Estimate   Date Value Ref Range Status   03/11/2022 >90 >60 mL/min/1.73m2 Final     Comment:     Effective December 21, 2021 eGFRcr in adults is calculated using the 2021 CKD-EPI creatinine equation which includes age and gender (Megan et al., NE, DOI: 10.1056/GVBKdq3291782)     No results found for: GFRESTBLACK  Lab Results   Component Value Date    CR 0.62 03/11/2022     No results found for: MICROALBUMIN    Healthy Eating:  Healthy Eating Assessed Today: Yes  Cultural/Zoroastrianism diet restrictions?: No  Meal planning/habits: Low carb, Avoiding sweets  Meals include: Breakfast, Lunch, Dinner  Beverages: Coffee, Water  Has patient met with a dietitian in the past?: No    Down about 20 lbs so far. Eating better since. Bought some new cookbooks. Making egg bake " (eggs/spinach/cheese) and then has daily during the week. Is meal prepping and making some new soups. Went out this wkd with her nieces and treated them to vinay. Has [employer] triple zero yogurt. Switched to sugar free creamer. Used to drink regular pop but has stopped that.     Used to stress eat and eat out a lot more before.  Often fast food. Feels bothered that she can't see the weight loss yet. Does feel better though.     Question about fruit.     Being Active:  Being Active Assessed Today: Yes  Exercise:: Yes    Monitoring:  Monitoring Assessed Today: Yes  Times checking blood sugar at home (number): Never        Taking Medications:  Diabetes Medication(s)     Biguanides       metFORMIN (GLUCOPHAGE) 500 MG tablet    Take 1 tablet (500 mg) by mouth 2 times daily (with meals)          Taking Medication Assessed Today: Yes  Current Treatments: Oral Medication (taken by mouth)  Problems taking diabetes medications regularly?: No  Diabetes medication side effects?: No    Problem Solving:  Problem Solving Assessed Today: No  Is the patient at risk for hypoglycemia?: No  Is the patient at risk for DKA?: No    Reducing Risks:  Reducing Risks Assessed Today: No    Healthy Coping:  Healthy Coping Assessed Today: Yes  Emotional response to diabetes: Ready to learn, Concern for health and well-being  Stage of change: ACTION (Actively working towards change)  Support resources: Websites  Patient Activation Measure Survey Score:  IZA Score (Last Two) 9/7/2010   IZA Raw Score 38   Activation Score 52.9   IZA Level 2         SHARLA Fraser CDE    Time Spent: 45 minutes  Encounter Type: Individual        Any diabetes medication dose changes were made via the Certified Diabetes Care & Education Protocol in collaboration with the patient's referring provider. A copy of this encounter was shared with the provider.

## 2022-04-05 NOTE — PROGRESS NOTES
Type of Service: Telephone Visit    Originating Location (Patient Location): Home  Distant Location (Provider Location): Home  Mode of Communication:  Telephone    Telephone Visit Start Time: 12:30 pm  Telephone Visit End Time (telephone visit stop time): 1:15 pm    How would patient like to obtain AVS? Ashley   Diabetes Self-Management Education & Support    Presents for: Initial Assessment for new diagnosis    Type of Visit: Telephone Visit    How would patient like to obtain AVS? Ashley    ASSESSMENT:  Dariela has made numerous positive changes to her diet since her dx 1-2 months ago. She has reduced her eating out significantly and is cooking at home for most meals.  Is having less carbs and bought a new cookbook for diabetes.   She is scared to eat though.  Would benefit from adding in some carbs and being a bit more liberal with her diet. Focused on changes that are maintainable.     She is tolerating the metformin and would benefit from testing her BG at home a bit to see how her numbers are.     Patient's most recent   Lab Results   Component Value Date    A1C 7.3 02/14/2022    is not meeting goal of <7.0    Diabetes knowledge and skills assessment:   Patient is knowledgeable in diabetes management concepts related to: new dx  Continue education with the following diabetes management concepts: Healthy Eating, Being Active, Monitoring, Taking Medication, Problem Solving, Reducing Risks and Healthy Coping  Based on learning assessment above, most appropriate setting for further diabetes education would be: Group class or Individual setting.    INTERVENTIONS:    Education provided today on:  AADE Self-Care Behaviors:  Diabetes Pathophysiology  Healthy Eating: carbohydrate counting, consistency in amount, composition, and timing of food intake, weight reduction and portion control  Being Active: relationship to blood glucose  Monitoring: purpose, proper technique, individual blood glucose targets, frequency  of monitoring and proper sharps disposal  Taking Medication: action of prescribed medication and side effects of prescribed medications  Reducing Risks: A1C - goals, relating to blood glucose levels, how often to check    Opportunities for ongoing education and support in diabetes-self management were discussed. Pt verbalized understanding of concepts discussed and recommendations provided today.       Education Materials Provided:  [facility] Understanding Diabetes Booklet will be mailed    PLAN  Order placed for BGM and supplies to her pharmacy.   She will check BG once daily. Goal before meals  mg/dL.   Aim for 30-45 grams of carb per meal.   Continue to cook mostly at home and can check out websites provided in her AVS if she needs more ideas for meals/recipes.   Keep working on weight loss.     Topics to cover at upcoming visits: Problem Solving, Reducing Risks and Healthy Coping  Follow-up: 5/17 scheduled    See Goals Section for co-developed, patient-stated behavior change goals.  AVS provided to patient today.      SUBJECTIVE / OBJECTIVE:  Presents for: Initial Assessment for new diagnosis  Accompanied by: Self  Diabetes education in the past 24mo: No  Diabetes type: Type 2  Date of diagnosis: newly dx  How confident are you filling out medical forms by yourself:: Not Assessed  Diabetes management related comments/concerns: No big questions. Scared to eat anything right now.  Difficulty affording diabetes medication?: No  Other concerns:: None  Cultural Influences/Ethnic Background:  Other    Diabetes Symptoms & Complications:  Weight trend: Stable  Complications assessed today?: Yes  Autonomic neuropathy: No  CVA: No  Heart disease: No  Nephropathy: No  Peripheral neuropathy: No  Peripheral Vascular Disease: No  Retinopathy: No    Patient Problem List and Family Medical History reviewed for relevant medical history, current medical status, and diabetes risk factors.    Vitals:  There were no  "vitals taken for this visit.  Estimated body mass index is 61.34 kg/m  as calculated from the following:    Height as of 3/1/22: 1.562 m (5' 1.5\").    Weight as of 3/1/22: 149.7 kg (330 lb).   Last 3 BP:   BP Readings from Last 3 Encounters:   03/01/22 (!) 167/98   02/14/22 132/88   02/15/21 (!) 158/95       History   Smoking Status     Current Some Day Smoker     Years: 5.00   Smokeless Tobacco     Never Used     Comment: 3/2022 at 1-2 cigs per day & cutting down       Labs:  Lab Results   Component Value Date    A1C 7.3 02/14/2022     Lab Results   Component Value Date     03/11/2022    GLC 98 07/13/2010     Lab Results   Component Value Date     02/14/2022     07/13/2010     HDL Cholesterol   Date Value Ref Range Status   07/13/2010 53 50 - 110 mg/dL Final     Direct Measure HDL   Date Value Ref Range Status   02/14/2022 49 (L) >=50 mg/dL Final   ]  GFR Estimate   Date Value Ref Range Status   03/11/2022 >90 >60 mL/min/1.73m2 Final     Comment:     Effective December 21, 2021 eGFRcr in adults is calculated using the 2021 CKD-EPI creatinine equation which includes age and gender (Megan et al., NEJ, DOI: 10.1056/PMVDvv4204738)     No results found for: GFRESTBLACK  Lab Results   Component Value Date    CR 0.62 03/11/2022     No results found for: MICROALBUMIN    Healthy Eating:  Healthy Eating Assessed Today: Yes  Cultural/Restorationism diet restrictions?: No  Meal planning/habits: Low carb, Avoiding sweets  Meals include: Breakfast, Lunch, Dinner  Beverages: Coffee, Water  Has patient met with a dietitian in the past?: No    Down about 20 lbs so far. Eating better since. Bought some new cookbooks. Making egg bake (eggs/spinach/cheese) and then has daily during the week. Is meal prepping and making some new soups. Went out this wkd with her nieces and treated them to sushi. Has [employer] triple zero yogurt. Switched to sugar free creamer. Used to drink regular pop but has stopped that.     Used to " stress eat and eat out a lot more before.  Often fast food. Feels bothered that she can't see the weight loss yet. Does feel better though.     Question about fruit.     Being Active:  Being Active Assessed Today: Yes  Exercise:: Yes    Monitoring:  Monitoring Assessed Today: Yes  Times checking blood sugar at home (number): Never        Taking Medications:  Diabetes Medication(s)     Biguanides       metFORMIN (GLUCOPHAGE) 500 MG tablet    Take 1 tablet (500 mg) by mouth 2 times daily (with meals)          Taking Medication Assessed Today: Yes  Current Treatments: Oral Medication (taken by mouth)  Problems taking diabetes medications regularly?: No  Diabetes medication side effects?: No    Problem Solving:  Problem Solving Assessed Today: No  Is the patient at risk for hypoglycemia?: No  Is the patient at risk for DKA?: No    Reducing Risks:  Reducing Risks Assessed Today: No    Healthy Coping:  Healthy Coping Assessed Today: Yes  Emotional response to diabetes: Ready to learn, Concern for health and well-being  Stage of change: ACTION (Actively working towards change)  Support resources: Websites  Patient Activation Measure Survey Score:  IZA Score (Last Two) 9/7/2010   IZA Raw Score 38   Activation Score 52.9   IZA Level 2         Zahra Koch RD LD CDE    Time Spent: 45 minutes  Encounter Type: Individual        Any diabetes medication dose changes were made via the Certified Diabetes Care & Education Protocol in collaboration with the patient's referring provider. A copy of this encounter was shared with the provider.

## 2022-04-08 NOTE — TELEPHONE ENCOUNTER
losartan (COZAAR) 50 MG tablet 45 tablet 0 3/30/2022  No   Sig - Route: Take 1 tablet (50 mg) by mouth daily Hold if dizzy or lightheaded - Oral   Sent to pharmacy as: Losartan Potassium 50 MG Oral Tablet (COZAAR)   Class: E-Prescribe   Order: [id number]   E-Prescribing Status: Receipt confirmed by pharmacy (3/30/2022 10:22 AM CDT)     Pharmacy    [facility] - [address]       Local [facility] seeking refill of losartan.  Rx was recently switched to mail order pharmacy.    Fax sent back to [facility], patient now using mail order.    RT Richard (R)

## 2022-04-13 ENCOUNTER — VIRTUAL VISIT (OUTPATIENT)
Dept: SURGERY | Facility: CLINIC | Age: 47
End: 2022-04-13
Payer: COMMERCIAL

## 2022-04-13 ENCOUNTER — OFFICE VISIT (OUTPATIENT)
Dept: SURGERY | Facility: CLINIC | Age: 47
End: 2022-04-13
Payer: COMMERCIAL

## 2022-04-13 VITALS
WEIGHT: 293 LBS | SYSTOLIC BLOOD PRESSURE: 122 MMHG | BODY MASS INDEX: 53.92 KG/M2 | RESPIRATION RATE: 17 BRPM | HEIGHT: 62 IN | DIASTOLIC BLOOD PRESSURE: 92 MMHG | TEMPERATURE: 98.3 F | OXYGEN SATURATION: 98 % | HEART RATE: 87 BPM

## 2022-04-13 DIAGNOSIS — E78.00 HIGH CHOLESTEROL: ICD-10-CM

## 2022-04-13 DIAGNOSIS — G47.9 SLEEP DISTURBANCE: ICD-10-CM

## 2022-04-13 DIAGNOSIS — E66.813 CLASS 3 SEVERE OBESITY DUE TO EXCESS CALORIES WITH SERIOUS COMORBIDITY AND BODY MASS INDEX (BMI) OF 50.0 TO 59.9 IN ADULT (H): Primary | ICD-10-CM

## 2022-04-13 DIAGNOSIS — E66.01 CLASS 3 SEVERE OBESITY DUE TO EXCESS CALORIES WITH SERIOUS COMORBIDITY AND BODY MASS INDEX (BMI) OF 50.0 TO 59.9 IN ADULT (H): Primary | ICD-10-CM

## 2022-04-13 DIAGNOSIS — E11.9 TYPE 2 DIABETES MELLITUS WITHOUT COMPLICATION, WITHOUT LONG-TERM CURRENT USE OF INSULIN (H): ICD-10-CM

## 2022-04-13 DIAGNOSIS — E66.01 MORBID OBESITY (H): ICD-10-CM

## 2022-04-13 PROCEDURE — 97802 MEDICAL NUTRITION INDIV IN: CPT | Mod: GT | Performed by: DIETITIAN, REGISTERED

## 2022-04-13 PROCEDURE — 99204 OFFICE O/P NEW MOD 45 MIN: CPT | Performed by: PHYSICIAN ASSISTANT

## 2022-04-13 RX ORDER — OMEGA-3S/DHA/EPA/FISH OIL 1000-1400
CAPSULE,DELAYED RELEASE (ENTERIC COATED) ORAL DAILY PRN
COMMUNITY
End: 2023-04-20

## 2022-04-13 NOTE — PROGRESS NOTES
Video-Visit Details    Type of service:  Video Visit    Video Start Time (time video started): 1:26    Video End Time (time video stopped): 2:05    Originating Location (pt. Location): Home    Distant Location (provider location):  [facility]     Mode of Communication:  Video Conference via [employer] phone     MEDICAL WEIGHT LOSS INITIAL EVALUATION  DIAGNOSIS:  Class III Obesity    NUTRITION HISTORY:    Diet Recall Review with Patient 4/11/2022   Do you typically eat breakfast? yes   If you do eat breakfast, what types of food do you eat? Now, eggs in low CHO tortilla or flavored oatmeal or egg bake (eggs, cheese, spinach or sausage). My diet has changed drastically over the past month after the Type 2 diagnosis within 1 hour of waking   Do you typically eat lunch? Yes   If you do eat lunch, what types of food do you typically eat?  Now, tuna melt w/o the bread. Low carb tortilla with meat @ 1:00-2:00 pm   Do you typically eat supper? Yes   If you do eat supper, what types of food do you typically eat? Prepped meals .low carb meals i have been making ahead on Sundays-home made soup, apple   Do you typically eat snacks? Yes   If you do snack, what types of food do you typically eat? Apples, again my diet has changed drastically since diagnosis. I am kind of scared of food right now     Beverage choices: water, Pepsi Zero,2 cups coffee + Grenadian vanilla with zero sugar, ETOH-2 glasses of wine per week  Dining out: 0-1X per week-david  Binge eating: not a current problem  Emotional eating: yes-stress, bored, depressed, anxious, happy/reward  Night time eating: no  Exercise: walking stairs in apt 3-4X per week-5 minutes (some knee pain)  Other: After losing her dad in 2018 she gained ~50-60 lbs. She started working remote during the pandemic and this also resulted in some weight gain. She is less active when working at home and is not comfortable working out in public. Has  "lost 14 lb since being diagnosed with T2DM. She is so wrried about her health and is afaid to eat many foods (panoid). Patient is also working with diabetes education. Has purchased some cook books for diabetic cooking.    MEDICATIONS:   will be starting Semiglutide  Has been on Metformin for ~ 1 month for T2DM    ANTHROPOMETRICS:  Height: 62\"   Weight: 314.8   BMI:  57.58 kg/m2  NUTRITION DIAGNOSIS:   Obese, class III related to excess energy intake as evidence by BMI of  57.58  NUTRITION INTERVENTIONS  Nutrition Prescription:  Recommend modified energy- nutrient intake  Implementation:  Nutrition Education (Content):    Discussed My Plate Planner    Determined alternative to emotional eating    Reviewed benefits of exercise    Provided: My Plate Planner, Protein Source for weight Loss, Tips for Weight Loss and Weight Management        Nutrition Education (Application):     Patient to practice goals as stated below    Patient verbalizes understanding of weight loss by stating the value of giving ups sugar sweetened beverges    Anticipate good compliance    Goals:  Gradually increase walking to 15 minutes 3-4 times per week  Focus on alternative to emotional eating (adult coloring, crafts, gardening, other hobbies)  Do meal prep on the weekend for breakfast (egg bake) and 2 dinners    FOLLOW UP AND MONITORING:    Other  - follow up in 4 weeks.     TIME SPENT WITH PATIENT:   31 minutes   Dave Green RD, KAMILLA  [facility], [place]  "
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
## 2022-04-13 NOTE — PATIENT INSTRUCTIONS
MEDICATION STARTED AT THIS APPOINTMENT    We are starting a GLP-1 (Glucagon-like Peptide-1) medication. Examples of this medication include Byetta, Bydureon, or Victoza, etc. The medication chosen will depend on insurance coverage, and can be changed to the medication that is covered under your plan. These medications work the same, in that they can help you lose weight and control your blood sugar. One of the ways it works is by slowing down the rate that food leaves your stomach. You feel oh and will eat less.  It also helps regulate hormones that can help improve your blood sugars.    Usually short acting insulins or oral agents are stopped or decreased by the doctor at the appointment. Low blood sugars are rare but can happen if patients are on insulin or other oral agents. If you notice consistent low sugars or high sugars, your medication may need to be adjusted after your appointment. If this is the case, please call RN and provide her your blood sugar record from the last 3-4 days. The RN will get in touch with the doctor and call you back/[employer] message with recommendations. We tolerate high sugars for a bit, so if sugars are running 180-200, this is ok. As weight starts dropping the blood sugars should too. If readings are consistently over 200 for 1-2 weeks, then you should call the doctor/nurse.    Types of GLP-1 medications include:    Victoza: Starting dose is 0.6 mg for a week, then 1.2 mg for a week, and then 1.8 mg thereafter (if prescribed by doctor). This medication is given daily. Pen needles need to be ordered also.    Ozempic: Starting dose is 0.25 mg once weekly for 4 weeks, and then increase to 0.5 mg weekly. If after 4 weeks of being on 0.5 mg weekly dosing and still wanting additional weight loss and/or blood sugar benefits, check with your doctor to see if they want to increase the dose to 1 mg weekly. Pen needles come in the Ozempic pen box.    Trulicity: Starting dose is 0.75 mg  once weekly.  If after 1-3 months of being on 0.75 mg weekly and still wanting additional weight loss and/or blood sugar benefits, check with your doctor to see if they want to increase the dose to 1.5 mg weekly.    Bydureon: Starting dose is 2 mg and is given weekly. The patient should pick one day a week and give the medication on that day. Pen needles need to be ordered also.    Byetta: Starting dose is 5 mcg twice daily. This is given for the first month. Check with the doctor after a month to see if they want the dose increased to 10 mcg BID. Pen needles need to be ordered also.     Side effects of GLP- Medications include: The most common side effects are all GI related and consist of: nausea, constipation, diarrhea, burping, or gassiness. Patients are advised to eat slowly and less, and nausea typically passes if people can stick it out.     The risk of pancreatitis (inflammation of the pancreas) has been associated with this type of medication, but is very rare.  If you have had pancreatitis in the past, this medication may not be for you. Please let us know about any past history of pancreas problems.      Symptoms of pancreatitis include: Pain in your upper stomach area which  may travel to your back and be worse after eating. Your stomach area may be tender to the touch.  You may have vomiting or nausea and/or have a fever. If you should develop any of these symptoms, stop the medication and contact your primary care doctor. They will do a blood test to check for pancreatitis.         There is a small chance you may have some low blood sugar after taking the medication.   The signs of low blood sugar are:  o Weakness  o Shaky   o Hungry  o Sweating  o Confusion      See below for ways to treat low blood sugar without adding in lots of extra calories.      Treating Low Blood Sugar    If you have symptoms of low blood sugar (sweating, shaking, dizzy, confused) eat 15 grams of carbs and wait 15  minutes:      Glucose Tabs are best for sugars under 70 -  Dex4 or BD Glucose tablets are good, you will need to take 3-4 of these to equal 15 grams.       One small box of raisins    4 oz fruit juice box or   cup fruit juice    1 small apple    1 small banana      cup canned fruit in water      English muffin or a slice of bread with jelly     1 low fat frozen waffle with sugar-free syrup      cup cottage cheese with   cup frozen or fresh blueberries    1 cup skim or low-fat milk      cup whole grain cereal    4-6 crackers such as Triscuits      This medication is usually covered by insurance for patients with a diagnosis of Type 2 Diabetes. Depending on insurance coverage, the medication may be changed to a different formulary, but they all work in the same way. Sometimes a prior authorization is required, which may take up to 1-2 weeks for an insurance company to make a decision if they will cover the medication. Please be patient, you will be notified either way.     Contact the nurse via [employer] or call [phone] if you have any questions or concerns. (Do not stop taking it if you don't think it's working. For some people it works without them knowing it.)     In order to get refills of this or any medication we prescribe you must be seen in the medical weight mgmt clinic every 2-4 months.

## 2022-04-13 NOTE — PATIENT INSTRUCTIONS
Dante Lyle-  Welcome to the [facility], [place]! It was great to visit with you and learn about your interest in weight loss. Below are the goals we discussed.    Goals:  Gradually increase walking to 15 minutes 3-4 times per week  Focus on alternative to emotional eating (adult coloring, crafts, gardening, other hobbies)  Do meal prep on the weekend for breakfast (egg bake) and 2 dinners    Nutrition Educational Materials:  Sent via mail    Please call [phone] to schedule your next visit with a Dietitian in 1 month.  Thanks!  Dave Green RD, LD  [facility], [place]

## 2022-04-18 NOTE — NURSING NOTE
Patients first measurements in the medical weight loss program     Date: 04/13/2022  Neck: 17  Waist: 54  Hip: 63

## 2022-04-25 ENCOUNTER — ANCILLARY PROCEDURE (OUTPATIENT)
Dept: MRI IMAGING | Facility: CLINIC | Age: 47
End: 2022-04-25
Attending: OBSTETRICS & GYNECOLOGY
Payer: COMMERCIAL

## 2022-04-25 DIAGNOSIS — N94.89 ADNEXAL MASS: ICD-10-CM

## 2022-04-25 PROCEDURE — 72197 MRI PELVIS W/O & W/DYE: CPT | Performed by: RADIOLOGY

## 2022-04-25 PROCEDURE — A9585 GADOBUTROL INJECTION: HCPCS | Performed by: RADIOLOGY

## 2022-04-25 RX ORDER — GADOBUTROL 604.72 MG/ML
15 INJECTION INTRAVENOUS ONCE
Status: COMPLETED | OUTPATIENT
Start: 2022-04-25 | End: 2022-04-25

## 2022-04-25 RX ADMIN — GADOBUTROL 14 ML: 604.72 INJECTION INTRAVENOUS at 10:39

## 2022-04-25 NOTE — DISCHARGE INSTRUCTIONS
MRI Contrast Discharge Instructions    The IV contrast you received today will pass out of your body in your  urine. This will happen in the next 24 hours. You will not feel this process.  Your urine will not change color.    Drink at least 4 extra glasses of water or juice today (unless your doctor  has restricted your fluids). This reduces the stress on your kidneys.  You may take your regular medicines.    If you are on dialysis: It is best to have dialysis today.    If you have a reaction: Most reactions happen right away. If you have  any new symptoms after leaving the hospital (such as hives or swelling),  call your hospital at the correct number below. Or call your family doctor.  If you have breathing distress or wheezing, call 911.    Special instructions: ***    I have read and understand the above information.    Signature:______________________________________ Date:___________    Staff:__________________________________________ Date:___________     Time:__________    [facility] Departments:    ___[place]: [phone]  ___[facility]: [phone]  ___[place] [facility]: [phone] ___[facility]: [phone]  ___[facility]: [phone]  ___[facility]: [phone]  ___[phone]  ___[facility]: [phone]  ___[phone]

## 2022-05-05 ENCOUNTER — TRANSFERRED RECORDS (OUTPATIENT)
Dept: HEALTH INFORMATION MANAGEMENT | Facility: CLINIC | Age: 47
End: 2022-05-05
Payer: COMMERCIAL

## 2022-05-05 LAB
ALT SERPL-CCNC: 57 IU/L (ref 0–32)
AST SERPL-CCNC: 35 IU/L (ref 0–40)
CREATININE (EXTERNAL): 0.59 MG/DL (ref 0.57–1)
GFR ESTIMATED (EXTERNAL): 113 ML/MIN/1.73M2
GLUCOSE (EXTERNAL): 117 MG/DL (ref 65–99)
INR (EXTERNAL): 0.9 (ref 0.9–1.2)
POTASSIUM (EXTERNAL): 4.1 MMOL/L (ref 3.5–5.2)

## 2022-05-07 DIAGNOSIS — I10 PRIMARY HYPERTENSION: ICD-10-CM

## 2022-05-10 DIAGNOSIS — I10 PRIMARY HYPERTENSION: ICD-10-CM

## 2022-05-10 RX ORDER — LOSARTAN POTASSIUM 50 MG/1
50 TABLET ORAL DAILY
Qty: 45 TABLET | Refills: 0 | OUTPATIENT
Start: 2022-05-10

## 2022-05-10 RX ORDER — LOSARTAN POTASSIUM 50 MG/1
50 TABLET ORAL DAILY
Qty: 90 TABLET | Refills: 0 | Status: SHIPPED | OUTPATIENT
Start: 2022-05-10 | End: 2022-07-17

## 2022-05-10 NOTE — TELEPHONE ENCOUNTER
Routing refill request to provider for review/approval because:  Please review script and dispense script. This appears to be a titration.   losartan (COZAAR) 50 MG tablet   Take 0.5 tablets (25 mg) by mouth daily Increase in 1 week to full tablet if well tolerated. Hold if dizzy or lightheaded   Dispense: 45 tablet     Refills: 0     Start: 3/1/2022     End: 3/30/2022       By:  Crystal Holbrook MD      Encounter     Roxanne Sandoval RN

## 2022-05-11 NOTE — TELEPHONE ENCOUNTER
Refill has been sent to the pharmacy dispense #[id number].   SW/CM Discharge Plan  Informed patient is ready for discharge. Patient’s discharge destination is Home/apartment. Patient to be picked up by family.  Patient/interested person has been counseled for post hospitalization care.   Initial implementation of the patient’s discharge plan has been arranged, including Sotalol med management.

## 2022-05-17 ENCOUNTER — TRANSFERRED RECORDS (OUTPATIENT)
Dept: HEALTH INFORMATION MANAGEMENT | Facility: CLINIC | Age: 47
End: 2022-05-17

## 2022-05-17 ENCOUNTER — VIRTUAL VISIT (OUTPATIENT)
Dept: EDUCATION SERVICES | Facility: CLINIC | Age: 47
End: 2022-05-17
Payer: COMMERCIAL

## 2022-05-17 DIAGNOSIS — E11.9 TYPE 2 DIABETES MELLITUS WITHOUT COMPLICATION, WITHOUT LONG-TERM CURRENT USE OF INSULIN (H): Primary | ICD-10-CM

## 2022-05-17 PROCEDURE — G0108 DIAB MANAGE TRN  PER INDIV: HCPCS | Mod: GT | Performed by: DIETITIAN, REGISTERED

## 2022-05-17 NOTE — PROGRESS NOTES
"    Diabetes Self-Management Education & Support    Presents for: Follow-up    Type of Visit: Telephone Visit    How would patient like to obtain AVS? Ashley     Originating Location (Patient Location): Home  Distant Location (Provider Location): Home  Mode of Communication:  Telephone    Telephone Visit Start Time: 12:30 pm  Telephone Visit End Time (telephone visit stop time): 1:15 pm        ASSESSMENT:  Has taken ozempic 0.5 mg dose x 1 so far.  Seems to be tolerating well. Met with the weight loss clinic last month and will be meeting with their MTM next week.    Has lost 22 lbs so far. \"Stuck\" here. Praised her on this weight loss and encouraged her to keep it up. Discussed that increasing her Ozempic doses should help continue to promote some weight loss for her as well as her keeping up with her diet changes.     Focused on maintaining her diet changes and continuing to work on exercise.     Hands are clammy with taking her meds. She is wondering about this.  Encouraged her to ask her PCP or MTM about this as I am not sure.     Patient's most recent   Lab Results   Component Value Date    A1C 7.3 02/14/2022    is not meeting goal of <7.0    Diabetes knowledge and skills assessment:   Patient is knowledgeable in diabetes management concepts related to: Healthy Eating and Being Active  Continue education with the following diabetes management concepts: Monitoring, Taking Medication, Problem Solving, Reducing Risks and Healthy Coping  Based on learning assessment above, most appropriate setting for further diabetes education would be: Group class or Individual setting.    INTERVENTIONS:    Education provided today on:  AADE Self-Care Behaviors:  Diabetes Pathophysiology  Healthy Eating: weight reduction, heart healthy diet and portion control. She was wondering if she eats too much fruit. Explained that her fruit intake is just fine and actually encouraged. Discussed maybe some more variety in types of fruit " and adding some protein to it (nuts, nut butter, cottage cheese, etc).   Being Active: relationship to blood glucose  Monitoring: log and interpret results and individual blood glucose targets  Taking Medication: benefits of her diabetes medications (Metformin and Ozempic).   Problem Solving: high blood glucose - causes, signs/symptoms, treatment and prevention, low blood glucose - causes, signs/symptoms, treatment and prevention and when to call health care provider  Reducing Risks: major complications of diabetes, foot care, annual eye exam and A1C - goals, relating to blood glucose levels, how often to check    Opportunities for ongoing education and support in diabetes-self management were discussed. Pt verbalized understanding of concepts discussed and recommendations provided today.       Education Materials Provided:  Goals for Your Diabetes Care    PLAN  Could add some protein when she has fruit.  Visit with MTM scheduled next week.   Due for A1c to be checked. Ordered this. She will call to schedule.     Topics to cover at upcoming visits: Reducing Risks and Healthy Coping  Follow-up: 7/12 scheduled    See Goals Section for co-developed, patient-stated behavior change goals.  AVS provided to patient today.          SUBJECTIVE / OBJECTIVE:  Presents for: Follow-up  Accompanied by: Self  Diabetes education in the past 24mo: Yes  Focus of Visit: Healthy Eating, Monitoring, Taking Medication  Diabetes type: Type 2  Date of diagnosis: newly dx  How confident are you filling out medical forms by yourself:: Not Assessed  Diabetes management related comments/concerns: Feels she knows what will make her feel worse and is more aware of what will make her feel disgusting. No longer feels scared to eat.  Difficulty affording diabetes medication?: No  Other concerns:: None  Cultural Influences/Ethnic Background:  Not  or     Diabetes Symptoms & Complications:  Weight trend: Decreasing  Complications  "assessed today?: Yes  Autonomic neuropathy: No  CVA: No  Heart disease: No  Nephropathy: No  Peripheral neuropathy: No  Peripheral Vascular Disease: No  Retinopathy: No    Patient Problem List and Family Medical History reviewed for relevant medical history, current medical status, and diabetes risk factors.    Vitals:  There were no vitals taken for this visit.  Estimated body mass index is 57.58 kg/m  as calculated from the following:    Height as of 4/13/22: 1.575 m (5' 2\").    Weight as of 4/13/22: 142.8 kg (314 lb 12.8 oz).   Last 3 BP:   BP Readings from Last 3 Encounters:   04/13/22 (!) 122/92   03/01/22 (!) 167/98   02/14/22 132/88       History   Smoking Status     Current Some Day Smoker     Years: 5.00   Smokeless Tobacco     Never Used     Comment: 3/2022 at 1-2 cigs per day & cutting down       Labs:  Lab Results   Component Value Date    A1C 7.3 02/14/2022     Lab Results   Component Value Date     03/11/2022    GLC 98 07/13/2010     Lab Results   Component Value Date     02/14/2022     07/13/2010     HDL Cholesterol   Date Value Ref Range Status   07/13/2010 53 50 - 110 mg/dL Final     Direct Measure HDL   Date Value Ref Range Status   02/14/2022 49 (L) >=50 mg/dL Final   ]  GFR Estimate   Date Value Ref Range Status   03/11/2022 >90 >60 mL/min/1.73m2 Final     Comment:     Effective December 21, 2021 eGFRcr in adults is calculated using the 2021 CKD-EPI creatinine equation which includes age and gender (Megan silverio al., NEJM, DOI: 10.1056/MJJBrd6374802)     No results found for: GFRESTBLACK  Lab Results   Component Value Date    CR 0.62 03/11/2022     No results found for: MICROALBUMIN    Healthy Eating:  Healthy Eating Assessed Today: Yes  Cultural/Yarsanism diet restrictions?: No  Meal planning/habits: Low carb, Avoiding sweets  Meals include: Breakfast, Lunch, Dinner  Beverages: Coffee, Water  Has patient met with a dietitian in the past?: No  Using her new cookbook for diabetes " still. Eating out more frequently lately for dinner. Making breakfast lately (quiche often with vegetables).   Has about 2 apples per day and likes grapes for a snack while working.     Being Active:  Being Active Assessed Today: Yes  Exercise:: Yes    Exercise is not as awesome. Gets social anxiety when exercising out by herself. Overheard someone comment about her when she was walking last week so then she stopped.  Knows she should keep walking/exercising.     Monitoring:  Monitoring Assessed Today: Yes  Did patient bring glucose meter to appointment? : Yes  Times checking blood sugar at home (number): 1  Times checking blood sugar at home (per): Day  Blood glucose trend: Decreasing    Glucose data:  1 level of 207 mg/dL (had ice cream on Mother's Day).  Usually 138 mg/dL or lower when she checks her BG. Average 110-120 mg/dL.       Taking Medications:  Diabetes Medication(s)     Biguanides       metFORMIN (GLUCOPHAGE) 500 MG tablet    Take 1 tablet (500 mg) by mouth 2 times daily (with meals)    Incretin Mimetic Agents (GLP-1 Receptor Agonists)       semaglutide (OZEMPIC) 2 MG/1.5ML SOPN pen    Inject 0.25 mg subcutaneously once weekly for 4 weeks then 0.5 mg once weekly.          Taking Medication Assessed Today: Yes  Current Treatments: Oral Medication (taken by mouth), Non-insulin Injectables  Problems taking diabetes medications regularly?: No  Diabetes medication side effects?: No    Problem Solving:  Problem Solving Assessed Today: Yes  Is the patient at risk for hypoglycemia?: No  Is the patient at risk for DKA?: N    Reducing Risks:  Reducing Risks Assessed Today: Yes  Diabetes Risks: Age over 45 years, Hyperlipidemia  CAD Risks: Obesity, Diabetes Mellitus  Has dilated eye exam at least once a year?: No    Healthy Coping:  Healthy Coping Assessed Today: Yes  Emotional response to diabetes: Ready to learn, Concern for health and well-being  Informal Support system:: Family, Friends  Stage of change:  ACTION (Actively working towards change)  Support resources: Websites  Patient Activation Measure Survey Score:  IZA Score (Last Two) 9/7/2010 4/11/2022   IZA Raw Score 38 32   Activation Score 52.9 62.6   IZA Level 2 3             SHARLA Fraser    Time Spent: 45 minutes  Encounter Type: Individual        Any diabetes medication dose changes were made via the Certified Diabetes Care & Education Protocol in collaboration with the patient's referring provider. A copy of this encounter was shared with the provider.

## 2022-05-17 NOTE — LETTER
"    5/17/2022         RE: Dariela Killian  [address]  [address]  [place] [address]        Dear Colleague,    Thank you for referring your patient, Dariela Killian, to the [facility]. Please see a copy of my visit note below.        Diabetes Self-Management Education & Support    Presents for: Follow-up    Type of Visit: Telephone Visit    How would patient like to obtain AVS? MyChart     Originating Location (Patient Location): Home  Distant Location (Provider Location): Home  Mode of Communication:  Telephone    Telephone Visit Start Time: 12:30 pm  Telephone Visit End Time (telephone visit stop time): 1:15 pm        ASSESSMENT:  Has taken ozempic 0.5 mg dose x 1 so far.  Seems to be tolerating well. Met with the weight loss clinic last month and will be meeting with their MTM next week.    Has lost 22 lbs so far. \"Stuck\" here. Praised her on this weight loss and encouraged her to keep it up. Discussed that increasing her Ozempic doses should help continue to promote some weight loss for her as well as her keeping up with her diet changes.     Focused on maintaining her diet changes and continuing to work on exercise.     Hands are clammy with taking her meds. She is wondering about this.  Encouraged her to ask her PCP or MTM about this as I am not sure.     Patient's most recent   Lab Results   Component Value Date    A1C 7.3 02/14/2022    is not meeting goal of <7.0    Diabetes knowledge and skills assessment:   Patient is knowledgeable in diabetes management concepts related to: Healthy Eating and Being Active  Continue education with the following diabetes management concepts: Monitoring, Taking Medication, Problem Solving, Reducing Risks and Healthy Coping  Based on learning assessment above, most appropriate setting for further diabetes education would be: Group class or Individual setting.    INTERVENTIONS:    Education provided today on:  AADE Self-Care " Behaviors:  Diabetes Pathophysiology  Healthy Eating: weight reduction, heart healthy diet and portion control. She was wondering if she eats too much fruit. Explained that her fruit intake is just fine and actually encouraged. Discussed maybe some more variety in types of fruit and adding some protein to it (nuts, nut butter, cottage cheese, etc).   Being Active: relationship to blood glucose  Monitoring: log and interpret results and individual blood glucose targets  Taking Medication: benefits of her diabetes medications (Metformin and Ozempic).   Problem Solving: high blood glucose - causes, signs/symptoms, treatment and prevention, low blood glucose - causes, signs/symptoms, treatment and prevention and when to call health care provider  Reducing Risks: major complications of diabetes, foot care, annual eye exam and A1C - goals, relating to blood glucose levels, how often to check    Opportunities for ongoing education and support in diabetes-self management were discussed. Pt verbalized understanding of concepts discussed and recommendations provided today.       Education Materials Provided:  Goals for Your Diabetes Care    PLAN  Could add some protein when she has fruit.  Visit with MTM scheduled next week.   Due for A1c to be checked. Ordered this. She will call to schedule.     Topics to cover at upcoming visits: Reducing Risks and Healthy Coping  Follow-up: 7/12 scheduled    See Goals Section for co-developed, patient-stated behavior change goals.  AVS provided to patient today.          SUBJECTIVE / OBJECTIVE:  Presents for: Follow-up  Accompanied by: Self  Diabetes education in the past 24mo: Yes  Focus of Visit: Healthy Eating, Monitoring, Taking Medication  Diabetes type: Type 2  Date of diagnosis: newly dx  How confident are you filling out medical forms by yourself:: Not Assessed  Diabetes management related comments/concerns: Feels she knows what will make her feel worse and is more aware of what  "will make her feel disgusting. No longer feels scared to eat.  Difficulty affording diabetes medication?: No  Other concerns:: None  Cultural Influences/Ethnic Background:  Not  or     Diabetes Symptoms & Complications:  Weight trend: Decreasing  Complications assessed today?: Yes  Autonomic neuropathy: No  CVA: No  Heart disease: No  Nephropathy: No  Peripheral neuropathy: No  Peripheral Vascular Disease: No  Retinopathy: No    Patient Problem List and Family Medical History reviewed for relevant medical history, current medical status, and diabetes risk factors.    Vitals:  There were no vitals taken for this visit.  Estimated body mass index is 57.58 kg/m  as calculated from the following:    Height as of 4/13/22: 1.575 m (5' 2\").    Weight as of 4/13/22: 142.8 kg (314 lb 12.8 oz).   Last 3 BP:   BP Readings from Last 3 Encounters:   04/13/22 (!) 122/92   03/01/22 (!) 167/98   02/14/22 132/88       History   Smoking Status     Current Some Day Smoker     Years: 5.00   Smokeless Tobacco     Never Used     Comment: 3/2022 at 1-2 cigs per day & cutting down       Labs:  Lab Results   Component Value Date    A1C 7.3 02/14/2022     Lab Results   Component Value Date     03/11/2022    GLC 98 07/13/2010     Lab Results   Component Value Date     02/14/2022     07/13/2010     HDL Cholesterol   Date Value Ref Range Status   07/13/2010 53 50 - 110 mg/dL Final     Direct Measure HDL   Date Value Ref Range Status   02/14/2022 49 (L) >=50 mg/dL Final   ]  GFR Estimate   Date Value Ref Range Status   03/11/2022 >90 >60 mL/min/1.73m2 Final     Comment:     Effective December 21, 2021 eGFRcr in adults is calculated using the 2021 CKD-EPI creatinine equation which includes age and gender (Megan et al., NEJM, DOI: 10.1056/QWEOwq7670796)     No results found for: GFRESTBLACK  Lab Results   Component Value Date    CR 0.62 03/11/2022     No results found for: MICROALBUMIN    Healthy Eating:  Healthy " Eating Assessed Today: Yes  Cultural/Cheondoism diet restrictions?: No  Meal planning/habits: Low carb, Avoiding sweets  Meals include: Breakfast, Lunch, Dinner  Beverages: Coffee, Water  Has patient met with a dietitian in the past?: No  Using her new cookbook for diabetes still. Eating out more frequently lately for dinner. Making breakfast lately (quiche often with vegetables).   Has about 2 apples per day and likes grapes for a snack while working.     Being Active:  Being Active Assessed Today: Yes  Exercise:: Yes    Exercise is not as awesome. Gets social anxiety when exercising out by herself. Overheard someone comment about her when she was walking last week so then she stopped.  Knows she should keep walking/exercising.     Monitoring:  Monitoring Assessed Today: Yes  Did patient bring glucose meter to appointment? : Yes  Times checking blood sugar at home (number): 1  Times checking blood sugar at home (per): Day  Blood glucose trend: Decreasing    Glucose data:  1 level of 207 mg/dL (had ice cream on Mother's Day).  Usually 138 mg/dL or lower when she checks her BG. Average 110-120 mg/dL.       Taking Medications:  Diabetes Medication(s)     Biguanides       metFORMIN (GLUCOPHAGE) 500 MG tablet    Take 1 tablet (500 mg) by mouth 2 times daily (with meals)    Incretin Mimetic Agents (GLP-1 Receptor Agonists)       semaglutide (OZEMPIC) 2 MG/1.5ML SOPN pen    Inject 0.25 mg subcutaneously once weekly for 4 weeks then 0.5 mg once weekly.          Taking Medication Assessed Today: Yes  Current Treatments: Oral Medication (taken by mouth), Non-insulin Injectables  Problems taking diabetes medications regularly?: No  Diabetes medication side effects?: No    Problem Solving:  Problem Solving Assessed Today: Yes  Is the patient at risk for hypoglycemia?: No  Is the patient at risk for DKA?: N    Reducing Risks:  Reducing Risks Assessed Today: Yes  Diabetes Risks: Age over 45 years, Hyperlipidemia  CAD Risks:  Obesity, Diabetes Mellitus  Has dilated eye exam at least once a year?: No    Healthy Coping:  Healthy Coping Assessed Today: Yes  Emotional response to diabetes: Ready to learn, Concern for health and well-being  Informal Support system:: Family, Friends  Stage of change: ACTION (Actively working towards change)  Support resources: Websites  Patient Activation Measure Survey Score:  IZA Score (Last Two) 9/7/2010 4/11/2022   IZA Raw Score 38 32   Activation Score 52.9 62.6   IZA Level 2 3             Zahra Koch RD LD [facility]    Time Spent: 45 minutes  Encounter Type: Individual        Any diabetes medication dose changes were made via the Certified Diabetes Care & Education Protocol in collaboration with the patient's referring provider. A copy of this encounter was shared with the provider.

## 2022-05-30 ENCOUNTER — MYC MEDICAL ADVICE (OUTPATIENT)
Dept: OBGYN | Facility: CLINIC | Age: 47
End: 2022-05-30
Payer: COMMERCIAL

## 2022-05-31 NOTE — TELEPHONE ENCOUNTER
"Patient reports pus and blood coming out of right nipple.Hx of breast infection a few years ago. Reports pain is not \"nearly as bad\" as before when infection was present.  Appropriate for clinic visit today? Or would ED be advised? Triage appt available with KD on Friday afternoon as well.  Bee Perez RN   "

## 2022-06-01 ENCOUNTER — TRANSFERRED RECORDS (OUTPATIENT)
Dept: HEALTH INFORMATION MANAGEMENT | Facility: CLINIC | Age: 47
End: 2022-06-01
Payer: COMMERCIAL

## 2022-06-02 ENCOUNTER — HOSPITAL ENCOUNTER (OUTPATIENT)
Dept: CARDIOLOGY | Facility: CLINIC | Age: 47
Discharge: HOME | End: 2022-06-02
Attending: INTERNAL MEDICINE | Admitting: INTERNAL MEDICINE
Payer: COMMERCIAL

## 2022-06-02 DIAGNOSIS — R06.09 DOE (DYSPNEA ON EXERTION): ICD-10-CM

## 2022-06-02 DIAGNOSIS — I10 PRIMARY HYPERTENSION: ICD-10-CM

## 2022-06-02 LAB — LVEF ECHO: NORMAL

## 2022-06-02 PROCEDURE — 999N000208 ECHOCARDIOGRAM COMPLETE

## 2022-06-02 PROCEDURE — 255N000002 HC RX 255 OP 636: Performed by: INTERNAL MEDICINE

## 2022-06-02 PROCEDURE — 93306 TTE W/DOPPLER COMPLETE: CPT | Mod: 26 | Performed by: INTERNAL MEDICINE

## 2022-06-02 RX ADMIN — HUMAN ALBUMIN MICROSPHERES AND PERFLUTREN 9 ML: 10; .22 INJECTION, SOLUTION INTRAVENOUS at 08:42

## 2022-06-04 ENCOUNTER — HEALTH MAINTENANCE LETTER (OUTPATIENT)
Age: 47
End: 2022-06-04

## 2022-06-04 NOTE — RESULT ENCOUNTER NOTE
Advise echo reviewed; function is within normal,  technically difficult study as per report, there is mention of mild to moderate concentric left ventricular hypertrophy [LVH], suggestive of some thickening of the walls of the heart [mild to moderate]; such usually occurs with elevated blood pressure/ or uncontrolled blood pressure, .  Adequate control of blood pressure within goal less than 130/80, will help remodeling and reversing the left ventricular hypertrophy [LVH] described on echocardiogram.

## 2022-06-06 ENCOUNTER — VIRTUAL VISIT (OUTPATIENT)
Dept: PHARMACY | Facility: CLINIC | Age: 47
End: 2022-06-06
Payer: COMMERCIAL

## 2022-06-06 ENCOUNTER — TELEPHONE (OUTPATIENT)
Dept: FAMILY MEDICINE | Facility: CLINIC | Age: 47
End: 2022-06-06
Payer: COMMERCIAL

## 2022-06-06 DIAGNOSIS — E66.01 MORBID OBESITY (H): ICD-10-CM

## 2022-06-06 DIAGNOSIS — E11.9 TYPE 2 DIABETES MELLITUS WITHOUT COMPLICATION, WITHOUT LONG-TERM CURRENT USE OF INSULIN (H): Primary | ICD-10-CM

## 2022-06-06 PROCEDURE — 99207 PR NO CHARGE LOS: CPT | Performed by: PHARMACIST

## 2022-06-06 RX ORDER — METFORMIN HCL 500 MG
500 TABLET, EXTENDED RELEASE 24 HR ORAL 2 TIMES DAILY WITH MEALS
Qty: 180 TABLET | Refills: 1 | Status: SHIPPED | OUTPATIENT
Start: 2022-06-06 | End: 2022-12-06

## 2022-06-06 ASSESSMENT — PATIENT HEALTH QUESTIONNAIRE - PHQ9
10. IF YOU CHECKED OFF ANY PROBLEMS, HOW DIFFICULT HAVE THESE PROBLEMS MADE IT FOR YOU TO DO YOUR WORK, TAKE CARE OF THINGS AT HOME, OR GET ALONG WITH OTHER PEOPLE: SOMEWHAT DIFFICULT
SUM OF ALL RESPONSES TO PHQ QUESTIONS 1-9: 7
SUM OF ALL RESPONSES TO PHQ QUESTIONS 1-9: 7

## 2022-06-06 NOTE — PATIENT INSTRUCTIONS
Recommendations from today's consult with Pharmacist                                                          Plan:  1. Change metformin over from IR to extended release (ER) - prescription was sent into pharmacy. Can take metformin  mg tablet: 1 tablet twice daily with meals OR 2 tablets once daily with largest meal of day.   2. Continue current regimen for now otherwise.  3. Will reach out in 2 weeks to see how metformin change is going and if wanting to increase Ozempic to 1 mg weekly.     Thank you for your time - it was a pleasure working with you today!     Lauren Bloch, PharmD, BCACP   Medication Therapy Management Pharmacist   [facility] [place]    Please note, this is a one-time consult that is free of charge covered by the clinic. In the future should you want to follow up with the pharmacist for an initial comprehensive review of medications or follow up check in on your medications, the visit will be billed to you. Cost for visit if not covered by insurance is $45-60 for 15 minutes, $75-90 for 30 minutes, $105-120 for 45 minutes, and $135-150 for 60 minutes depending on if initial or follow up visit.

## 2022-06-06 NOTE — TELEPHONE ENCOUNTER
----- Message from Crystal Holbrook MD sent at 6/4/2022  6:22 PM CDT -----  Advise echo reviewed; function is within normal,  technically difficult study as per report, there is mention of mild to moderate concentric left ventricular hypertrophy [LVH], suggestive of some thickening of the walls of the heart [mild to moderate]; such usually occurs with elevated blood pressure/ or uncontrolled blood pressure, .  Adequate control of blood pressure within goal less than 130/80, will help remodeling and reversing the left ventricular hypertrophy [LVH] described on echocardiogram.

## 2022-06-06 NOTE — PROGRESS NOTES
"Clinical Pharmacy Consult:                                                    Dariela Killian is a 47 year old female called for a clinical pharmacist consult.  She was referred to me from Robert Alvarado PA-C. Insurance does not cover comprehensive medication review with Medication Therapy Management (MTM). Patient refuses private pay for Medication Therapy Management (MTM).  Therefore, completed one time consult today.     Reason for Consult: Ozempic start follow up    Obesity:   Ozempic 0.5 mg weekly on Wednesdays   Metformin  mg twice daily with meals     Followed by Robert Alvarado PA-C, seen 4/13/2022 for New Medical Weight Management. Patient is experiencing the follow side effects: diarrhea, stomach pains. Reports that diet has recently changed, has increased fiber. More recent diagnosis of type 2 diabetes. Seen by Dietitian, DARCI Fraser. Weighs self at home.   Diet/Eating Habits: She has been feeling oh between meals. She is noticing smaller portions. Has decreased portion sizes. Patient reports 2 meals per day. Working on meal prepping.    Exercise/Activity: Patient reports walking 30 minutes 1-2 weeks.   Failed medications include: none   Blood sugar monitoring: 3 times a week. Ranges (patient reported): Fasting ; post prandial 180-210 mg/dL   Symptoms of low blood sugar? none  Symptoms of high blood sugar? none  Eye exam: due  Foot exam: up to date  Aspirin: N/A  Statin: Yes: atorvastatin 20 mg daily    ACEi/ARB: Yes: losartan 50 mg daily.   Urine Albumin:   Lab Results   Component Value Date    UMALCR 15.53 03/01/2022     Current weight today: 304 lb   Wt Readings from Last 4 Encounters:   04/13/22 314 lb 12.8 oz (142.8 kg)   03/01/22 330 lb (149.7 kg)   02/14/22 330 lb 8 oz (149.9 kg)   02/15/21 327 lb (148.3 kg)     Estimated body mass index is 57.58 kg/m  as calculated from the following:    Height as of 4/13/22: 5' 2\" (1.575 m).    Weight as of 4/13/22: 314 lb 12.8 oz (142.8 " kg).    Hemoglobin A1C   Date Value Ref Range Status   02/14/2022 7.3 (H) 0.0 - 5.6 % Final     Comment:     Normal <5.7%   Prediabetes 5.7-6.4%    Diabetes 6.5% or higher     Note: Adopted from ADA consensus guidelines.     Creatinine   Date Value Ref Range Status   03/11/2022 0.62 0.52 - 1.04 mg/dL Final     Plan:  1. Changed metformin over from  to ER [facility] with prescribing provider Crystal Holbrook MD.   2. Continue current regimen for now otherwise.  3. Will reach out in 2 weeks to see how metformin change is going and if wanting to increase Ozempic to 1 mg weekly.     Lauren Bloch, PharmD, BCACP   Medication Therapy Management Pharmacist   [facility] [place]

## 2022-06-06 NOTE — TELEPHONE ENCOUNTER
Writer called patient,   Reviewed provider message below.   Patient states  BP medication is helping with lowering BP.     Patient has already scheduled follow up appt. No further questions at this time.    6/10/2022 7:00 AM (Arrive by 6:45 AM) Crystal Holbrook MD [facility]       Alfie Sinha RN  [facility]

## 2022-06-07 DIAGNOSIS — E11.9 TYPE 2 DIABETES MELLITUS WITHOUT COMPLICATION, WITHOUT LONG-TERM CURRENT USE OF INSULIN (H): ICD-10-CM

## 2022-06-07 DIAGNOSIS — E66.01 CLASS 3 SEVERE OBESITY DUE TO EXCESS CALORIES WITH SERIOUS COMORBIDITY AND BODY MASS INDEX (BMI) OF 50.0 TO 59.9 IN ADULT (H): ICD-10-CM

## 2022-06-07 DIAGNOSIS — E66.813 CLASS 3 SEVERE OBESITY DUE TO EXCESS CALORIES WITH SERIOUS COMORBIDITY AND BODY MASS INDEX (BMI) OF 50.0 TO 59.9 IN ADULT (H): ICD-10-CM

## 2022-06-07 NOTE — TELEPHONE ENCOUNTER
Received a fax request for a #90 supply of Ozempic.     Called [facility] pharmacy. Per staff - pt cannot get the last 30 day supply of medication because insurance is requiring that she fill #90 worth. A new rx for #90 days of needed.    Will forward to Yu, Pharm D for new rx.    Mandy Gage RN on 6/7/2022 at 11:38 AM

## 2022-06-08 ENCOUNTER — TELEPHONE (OUTPATIENT)
Dept: SURGERY | Facility: CLINIC | Age: 47
End: 2022-06-08
Payer: COMMERCIAL

## 2022-06-08 NOTE — TELEPHONE ENCOUNTER
"Received refill request from [facility] mail order pharmacy.    Called patient who stated she was told by pharmacy that her Ozempic 30 day prescription isn't available due to her insurance, and she should call her insurance.    Called insurance and was on hold for > 30\"; therefore opted to go online and complete a request.  The only request available was to request mail order.  Patient did this.  Pt will take last Ozempic dose nest Wednesday 6/15/22.    Called pharmacy and spoke with Elijah who could see the order for 90 days that was just placed yesterday.  This was placed yesterday because our clinic was told by [facility] pharmacy in [place] that a 90 day was needed in order to get the 30 days left on current prescription.    Per Elijah the new 90 day prescription only allows pt to  the Ozempic July 11th.  Pt's last prescription was picked up 5/12/22.    Elijah got hold of insurance and had to take off enrollment for mail order and patient will be able to  her next 30 day supply and then the new 90 day supply when needed.  Will update pt.  Janelle Jane, MS, RD, RN    "

## 2022-06-10 ENCOUNTER — OFFICE VISIT (OUTPATIENT)
Dept: FAMILY MEDICINE | Facility: CLINIC | Age: 47
End: 2022-06-10
Payer: COMMERCIAL

## 2022-06-10 VITALS
HEART RATE: 95 BPM | TEMPERATURE: 97.7 F | RESPIRATION RATE: 16 BRPM | OXYGEN SATURATION: 97 % | HEIGHT: 62 IN | WEIGHT: 293 LBS | BODY MASS INDEX: 53.92 KG/M2

## 2022-06-10 DIAGNOSIS — D72.829 LEUKOCYTOSIS, UNSPECIFIED TYPE: ICD-10-CM

## 2022-06-10 DIAGNOSIS — N92.4 EXCESSIVE BLEEDING IN PREMENOPAUSAL PERIOD: ICD-10-CM

## 2022-06-10 DIAGNOSIS — N83.202 CYST OF LEFT OVARY: ICD-10-CM

## 2022-06-10 DIAGNOSIS — R79.0 LOW FERRITIN: Primary | ICD-10-CM

## 2022-06-10 DIAGNOSIS — E78.00 HIGH CHOLESTEROL: ICD-10-CM

## 2022-06-10 DIAGNOSIS — E66.01 MORBID OBESITY (H): ICD-10-CM

## 2022-06-10 DIAGNOSIS — I10 PRIMARY HYPERTENSION: ICD-10-CM

## 2022-06-10 DIAGNOSIS — E11.9 TYPE 2 DIABETES MELLITUS WITHOUT COMPLICATION, WITHOUT LONG-TERM CURRENT USE OF INSULIN (H): Primary | ICD-10-CM

## 2022-06-10 DIAGNOSIS — K75.81 STEATOHEPATITIS: ICD-10-CM

## 2022-06-10 DIAGNOSIS — Z23 HIGH PRIORITY FOR 2019-NCOV VACCINE: ICD-10-CM

## 2022-06-10 LAB
ALBUMIN SERPL-MCNC: 3.9 G/DL (ref 3.4–5)
ALBUMIN UR-MCNC: 100 MG/DL
ALP SERPL-CCNC: 85 U/L (ref 40–150)
ALT SERPL W P-5'-P-CCNC: 60 U/L (ref 0–50)
APPEARANCE UR: ABNORMAL
AST SERPL W P-5'-P-CCNC: 36 U/L (ref 0–45)
BACTERIA #/AREA URNS HPF: ABNORMAL /HPF
BILIRUB DIRECT SERPL-MCNC: 0.1 MG/DL (ref 0–0.2)
BILIRUB SERPL-MCNC: 0.5 MG/DL (ref 0.2–1.3)
BILIRUB UR QL STRIP: ABNORMAL
COLOR UR AUTO: YELLOW
CREAT UR-MCNC: 787 MG/DL
CRP SERPL-MCNC: 11 MG/L (ref 0–8)
FERRITIN SERPL-MCNC: 11 NG/ML (ref 8–252)
GLUCOSE UR STRIP-MCNC: NEGATIVE MG/DL
HBA1C MFR BLD: 6.1 % (ref 0–5.6)
HGB UR QL STRIP: NEGATIVE
KETONES UR STRIP-MCNC: ABNORMAL MG/DL
LEUKOCYTE ESTERASE UR QL STRIP: NEGATIVE
MICROALBUMIN UR-MCNC: 70 MG/L
MICROALBUMIN/CREAT UR: 8.89 MG/G CR (ref 0–25)
NITRATE UR QL: NEGATIVE
PH UR STRIP: 5.5 [PH] (ref 5–7)
PROT SERPL-MCNC: 7.9 G/DL (ref 6.8–8.8)
RBC #/AREA URNS AUTO: ABNORMAL /HPF
SP GR UR STRIP: >=1.03 (ref 1–1.03)
SQUAMOUS #/AREA URNS AUTO: ABNORMAL /LPF
UROBILINOGEN UR STRIP-ACNC: 1 E.U./DL
WBC #/AREA URNS AUTO: ABNORMAL /HPF

## 2022-06-10 PROCEDURE — 83036 HEMOGLOBIN GLYCOSYLATED A1C: CPT | Performed by: INTERNAL MEDICINE

## 2022-06-10 PROCEDURE — 0064A COVID-19,PF,MODERNA (18+ YRS BOOSTER .25ML): CPT | Performed by: INTERNAL MEDICINE

## 2022-06-10 PROCEDURE — 36415 COLL VENOUS BLD VENIPUNCTURE: CPT | Performed by: INTERNAL MEDICINE

## 2022-06-10 PROCEDURE — 85025 COMPLETE CBC W/AUTO DIFF WBC: CPT | Performed by: INTERNAL MEDICINE

## 2022-06-10 PROCEDURE — 91306 COVID-19,PF,MODERNA (18+ YRS BOOSTER .25ML): CPT | Performed by: INTERNAL MEDICINE

## 2022-06-10 PROCEDURE — 80076 HEPATIC FUNCTION PANEL: CPT | Performed by: INTERNAL MEDICINE

## 2022-06-10 PROCEDURE — 86140 C-REACTIVE PROTEIN: CPT | Performed by: INTERNAL MEDICINE

## 2022-06-10 PROCEDURE — 99214 OFFICE O/P EST MOD 30 MIN: CPT | Mod: 25 | Performed by: INTERNAL MEDICINE

## 2022-06-10 PROCEDURE — 82043 UR ALBUMIN QUANTITATIVE: CPT | Performed by: INTERNAL MEDICINE

## 2022-06-10 PROCEDURE — 82728 ASSAY OF FERRITIN: CPT | Performed by: INTERNAL MEDICINE

## 2022-06-10 PROCEDURE — 81001 URINALYSIS AUTO W/SCOPE: CPT | Performed by: INTERNAL MEDICINE

## 2022-06-10 RX ORDER — FERROUS SULFATE 325(65) MG
325 TABLET, DELAYED RELEASE (ENTERIC COATED) ORAL EVERY OTHER DAY
Qty: 45 TABLET | Refills: 0 | Status: SHIPPED | OUTPATIENT
Start: 2022-06-10 | End: 2022-09-06

## 2022-06-10 ASSESSMENT — PATIENT HEALTH QUESTIONNAIRE - PHQ9
SUM OF ALL RESPONSES TO PHQ QUESTIONS 1-9: 7
10. IF YOU CHECKED OFF ANY PROBLEMS, HOW DIFFICULT HAVE THESE PROBLEMS MADE IT FOR YOU TO DO YOUR WORK, TAKE CARE OF THINGS AT HOME, OR GET ALONG WITH OTHER PEOPLE: SOMEWHAT DIFFICULT

## 2022-06-10 ASSESSMENT — PAIN SCALES - GENERAL: PAINLEVEL: NO PAIN (0)

## 2022-06-10 NOTE — PROGRESS NOTES
Assessment & Plan   Problem List Items Addressed This Visit        Digestive    Morbid obesity (H)    Relevant Orders    Hepatic panel (Albumin, ALT, AST, Bili, Alk Phos, TP)       Endocrine    Diabetes mellitus, type 2 (H) - Primary    Relevant Orders    OPTOMETRY REFERRAL    Hemoglobin A1c    High cholesterol      Other Visit Diagnoses     Excessive bleeding in premenopausal period        Relevant Orders    Ferritin    Leukocytosis, unspecified type        Relevant Orders    CBC with platelets and differential    UA with Microscopic reflex to Culture - lab collect    CRP, inflammation    Urine Microscopic    Primary hypertension        Relevant Orders    Albumin Random Urine Quantitative with Creat Ratio    Steatohepatitis        Cyst of left ovary             Continue follow-up with diabetic educator, continue follow-up with GI for liver disease steatohepatitis,  Get Moderna booster vaccine,  Monitor LFTs, white count elevated mild denies any associated systemic symptoms no urinary symptoms no cough or phlegm.  She is a light smoker.  Continue with exercise and lifestyle changes congratulated patient on losing weight,  Repeat labs today follow-up with GYN for the largely left ovarian cyst and heavy periods we will check ferritin iron studies today.  She has no anemia.  All questions answered to patient satisfaction         Work on weight loss  Regular exercise  See Patient Instructions    Return if symptoms worsen or fail to improve, for As needed and if symptoms worsen, BP Recheck.    Crystal Holbrook MD  [facility]  Total time spent was 63 minutes review of records exam and recommendations  Roberto Lyle is a 47 year old who presents for the following health issues   History of Present Illness       Diabetes:   She presents for follow up of diabetes.  She is checking home blood glucose one time daily. She checks blood glucose before and after meals.  Blood glucose is never over 200  and never under 70. She has no concerns regarding her diabetes at this time.  She is not experiencing numbness or burning in feet, excessive thirst, blurry vision, weight changes or redness, sores or blisters on feet. The patient has not had a diabetic eye exam in the last 12 months.         Hypertension: She presents for follow up of hypertension.  She does check blood pressure  regularly outside of the clinic. Outpatient blood pressures have not been over 140/90. She follows a low salt diet.     She eats 4 or more servings of fruits and vegetables daily.She consumes 0 sweetened beverage(s) daily.She exercises with enough effort to increase her heart rate 20 to 29 minutes per day.  She exercises with enough effort to increase her heart rate 3 or less days per week.   She is taking medications regularly.    Today's PHQ-9         PHQ-9 Total Score: 7    PHQ-9 Q9 Thoughts of better off dead/self-harm past 2 weeks :   Not at all    How difficult have these problems made it for you to do your work, take care of things at home, or get along with other people: Somewhat difficult       Patient presenting for follow-up.  Blood pressure seems to be better controlled.  Compliant with medication.  Similar.  Negative.  Has a left large ovarian cyst follows GYN has history of emesis.  Heavy menses, has low iron saturation no anemia.  Has fatty liver steatohepatitis seen GI.  Work-up was done.  She has lost weight since last seen working on exercise and diet she is down by 13 pounds since last seen and since March she has lost 29 pounds.    Review of Systems   Constitutional, HEENT, cardiovascular, pulmonary, gi and gu systems are negative, except as otherwise noted.      Objective    There were no vitals taken for this visit.  There is no height or weight on file to calculate BMI.  Physical Exam   GENERAL: healthy, alert and no distress  EYES: Eyes grossly normal to inspection, PERRL and conjunctivae and sclerae normal  NECK: no  adenopathy, no asymmetry, masses, or scars and thyroid normal to palpation  RESP: lungs clear to auscultation - no rales, rhonchi or wheezes  CV: regular rate and rhythm, normal S1 S2, no S3 or S4, no murmur, click or rub, no peripheral edema and peripheral pulses strong  ABDOMEN: soft, nontender, no hepatosplenomegaly, no masses and bowel sounds normal  MS: no gross musculoskeletal defects noted, no edema  SKIN: no suspicious lesions or rashes  NEURO: Normal strength and tone, mentation intact and speech normal  PSYCH: mentation appears normal, affect normal/[facility] Outpatient Visit on 06/02/2022   Component Date Value Ref Range Status     LVEF  06/02/2022 55-60%   Final

## 2022-06-13 LAB
BASOPHILS # BLD AUTO: 0.1 10E3/UL (ref 0–0.2)
BASOPHILS NFR BLD AUTO: 1 %
EOSINOPHIL # BLD AUTO: 0.8 10E3/UL (ref 0–0.7)
EOSINOPHIL NFR BLD AUTO: 5 %
ERYTHROCYTE [DISTWIDTH] IN BLOOD BY AUTOMATED COUNT: 13.9 % (ref 10–15)
HCT VFR BLD AUTO: 48.1 % (ref 35–47)
HGB BLD-MCNC: 14.8 G/DL (ref 11.7–15.7)
IMM GRANULOCYTES # BLD: 0.1 10E3/UL
IMM GRANULOCYTES NFR BLD: 1 %
LYMPHOCYTES # BLD AUTO: 3.7 10E3/UL (ref 0.8–5.3)
LYMPHOCYTES NFR BLD AUTO: 23 %
MCH RBC QN AUTO: 26.3 PG (ref 26.5–33)
MCHC RBC AUTO-ENTMCNC: 30.8 G/DL (ref 31.5–36.5)
MCV RBC AUTO: 85 FL (ref 78–100)
MONOCYTES # BLD AUTO: 1 10E3/UL (ref 0–1.3)
MONOCYTES NFR BLD AUTO: 7 %
NEUTROPHILS # BLD AUTO: 9.9 10E3/UL (ref 1.6–8.3)
NEUTROPHILS NFR BLD AUTO: 63 %
PLATELET # BLD AUTO: 371 10E3/UL (ref 150–450)
RBC # BLD AUTO: 5.63 10E6/UL (ref 3.8–5.2)
WBC # BLD AUTO: 15.6 10E3/UL (ref 4–11)

## 2022-06-14 ENCOUNTER — TELEPHONE (OUTPATIENT)
Dept: FAMILY MEDICINE | Facility: CLINIC | Age: 47
End: 2022-06-14
Payer: COMMERCIAL

## 2022-06-14 NOTE — TELEPHONE ENCOUNTER
----- Message from Crystal Holbrook MD sent at 6/13/2022  9:41 PM CDT -----  Dariela,  CBC shows elevated white blood cell count with left shift.  Urine microscopy slightly abnormal,   Please schedule telephone visit to discuss elevated white counts that is chronic, you will need to see blood specialist in my opinion, if you agree I can place an electronic consult to Hematology for further advise    Are you having any urinary symptoms of frequency or dysuria / burning ? Any pelvic pain, any fever or chills?  Chronic smoking sometimes can increase white cell count as well..    Dr Holbrook

## 2022-06-14 NOTE — TELEPHONE ENCOUNTER
Patient Contact    Attempt # 1    Was call answered?  No.  Left message on voicemail with information to call back.    Jessie MCCORD, Triage RN  [facility]

## 2022-06-14 NOTE — RESULT ENCOUNTER NOTE
Dariela,  CBC shows elevated white blood cell count with left shift.  Urine microscopy slightly abnormal,   Please schedule telephone visit to discuss elevated white counts that is chronic, you will need to see blood specialist in my opinion, if you agree I can place an electronic consult to Hematology for further advise    Are you having any urinary symptoms of frequency or dysuria / burning ? Any pelvic pain, any fever or chills?  Chronic smoking sometimes can increase white cell count as well..    Dr Holbrook

## 2022-06-15 DIAGNOSIS — D72.829 LEUKOCYTOSIS, UNSPECIFIED TYPE: Primary | ICD-10-CM

## 2022-06-15 PROCEDURE — 99207 E-CONSULT TO HEMATOLOGY (ADULT OUTPT PROVIDER TO SPECIALIST WRITTEN QUESTION & RESPONSE): CPT | Performed by: INTERNAL MEDICINE

## 2022-06-15 NOTE — TELEPHONE ENCOUNTER
Pt returned call. Notes has has read 05/10 lab notes. She denies dysuria or urinary frequency. She agrees with referral to hematology. She is crying and doesn't think she can afford to pay for multiple appts. Reports last VV was not covered by her insurance, States if telephone visit is to discuss hematology referral, she just wants to get referral and not schedule another VV. Please advice on referral.       Ok to leave a detailed message at [phone] with providers response.

## 2022-06-15 NOTE — TELEPHONE ENCOUNTER
2ND attempt to reach patient. No answer left voicemail to call back and speak with traige.    Nabila Parekh RN

## 2022-06-16 ENCOUNTER — E-CONSULT (OUTPATIENT)
Dept: HEMATOLOGY | Facility: CLINIC | Age: 47
End: 2022-06-16
Payer: COMMERCIAL

## 2022-06-16 PROCEDURE — 99451 NTRPROF PH1/NTRNET/EHR 5/>: CPT | Performed by: INTERNAL MEDICINE

## 2022-06-16 NOTE — PROGRESS NOTES
ALL SMARTFIELDS MUST BE COMPLETED FOR PATIENT CARE AND BILLING    6/16/2022     E-Consult has been accepted.    Interprofessional consultation requested by:  Crystal Holbrook MD      Clinical Question/Purpose: MY CLINICAL QUESTION IS: leukocytosis, chronic    Patient assessment and information reviewed: 48 YO woman with DM found to have asymptomatic chronic mild neutrophilic leukocytosis, likely benign. No clear cause such as smoking or medications, but also no concerning features.    Recommendations: Continue annual monitoring and as needed with any new symptoms concerning for hematologic disorders such as B symptoms and new bruising/bleeding symptoms.  Otherwise annual CBC and differential is sufficient for monitoring.       The recommendations provided in this E-Consult are based on a review of clinical data pertinent to the clinical question presented, without a review of the patient's complete medical record or, the benefit of a comprehensive in-person or virtual patient evaluation. This consultation should not replace the clinical judgement and evaluation of the provider ordering this E-Consult. Any new clinical issues, or changes in patient status since the filing of this E-Consult will need to be taken into account when assessing these recommendations. Please contact me if you have further questions.    My total time spent reviewing clinical information and formulating assessment was 5 minutes.    Report sent automatically to requesting provider once signed.     Homer Iniguez MD

## 2022-06-16 NOTE — TELEPHONE ENCOUNTER
Patient reviewed provider [employer] message, signing encounter: Last read by Dariela Killian at 10:58 PM on 6/15/2022.      Alfie Sinha RN  [facility]

## 2022-06-16 NOTE — TELEPHONE ENCOUNTER
This provider called patient tonight [there was no answer] and left her 2 voice messages to discuss lab results; elevated white blood cell count.  An E consult was placed to hematology as per her approval.  Message was sent to the patient with explanation.

## 2022-06-17 ENCOUNTER — TELEPHONE (OUTPATIENT)
Dept: FAMILY MEDICINE | Facility: CLINIC | Age: 47
End: 2022-06-17

## 2022-06-17 NOTE — TELEPHONE ENCOUNTER
Patient Contact    Attempt # 1    Was call answered?  No.  Left message on voicemail with information to call back or review [employer] message. Sent [employer] message.     Jessie MCCORD, Triage RN  [facility]       Crystal Holbrook MD   to P  [facility] IM 6/16/2022  1:27 PM   Attached Progress Notes   Please notify patient of he Hematology recommendation, they recommend yearly monitoring of white blood cell counts.     Patient assessment and information reviewed: 48 YO woman with DM found to have asymptomatic chronic mild neutrophilic leukocytosis, likely benign. No clear cause such as smoking or medications, but also no concerning features.     Recommendations: Continue annual monitoring and as needed with any new symptoms concerning for hematologic disorders such as B symptoms and new bruising/bleeding symptoms.  Otherwise annual CBC and differential is sufficient for monitoring.

## 2022-06-21 NOTE — TELEPHONE ENCOUNTER
Patient reviewed [employer] message : Last read by Dariela Killian at 11:21 AM on 6/17/2022.          Alfie Sinha RN  [facility]

## 2022-07-14 DIAGNOSIS — E78.5 DYSLIPIDEMIA: ICD-10-CM

## 2022-07-14 DIAGNOSIS — I10 PRIMARY HYPERTENSION: ICD-10-CM

## 2022-07-17 RX ORDER — ATORVASTATIN CALCIUM 20 MG/1
20 TABLET, FILM COATED ORAL DAILY
Qty: 90 TABLET | Refills: 2 | Status: SHIPPED | OUTPATIENT
Start: 2022-07-17 | End: 2023-03-06

## 2022-07-17 NOTE — TELEPHONE ENCOUNTER
Routing refill request to provider for review/approval because:  BP Readings from Last 3 Encounters:   04/13/22 (!) 122/92   03/01/22 (!) 167/98   02/14/22 132/88         Alonzo Chaves RN  [facility] Triage Nurse

## 2022-07-18 RX ORDER — LOSARTAN POTASSIUM 50 MG/1
50 TABLET ORAL DAILY
Qty: 90 TABLET | Refills: 2 | Status: SHIPPED | OUTPATIENT
Start: 2022-07-18 | End: 2023-03-06

## 2022-07-25 ENCOUNTER — OFFICE VISIT (OUTPATIENT)
Dept: SURGERY | Facility: CLINIC | Age: 47
End: 2022-07-25
Payer: COMMERCIAL

## 2022-07-25 VITALS
BODY MASS INDEX: 53.92 KG/M2 | DIASTOLIC BLOOD PRESSURE: 82 MMHG | OXYGEN SATURATION: 95 % | HEIGHT: 62 IN | WEIGHT: 293 LBS | SYSTOLIC BLOOD PRESSURE: 136 MMHG | HEART RATE: 87 BPM

## 2022-07-25 DIAGNOSIS — R12 HEARTBURN: ICD-10-CM

## 2022-07-25 DIAGNOSIS — E66.01 MORBID OBESITY (H): Primary | ICD-10-CM

## 2022-07-25 DIAGNOSIS — E11.9 TYPE 2 DIABETES MELLITUS WITHOUT COMPLICATION, WITHOUT LONG-TERM CURRENT USE OF INSULIN (H): ICD-10-CM

## 2022-07-25 DIAGNOSIS — E78.00 HIGH CHOLESTEROL: ICD-10-CM

## 2022-07-25 PROCEDURE — 99214 OFFICE O/P EST MOD 30 MIN: CPT | Performed by: PHYSICIAN ASSISTANT

## 2022-07-25 RX ORDER — SEMAGLUTIDE 1.34 MG/ML
1 INJECTION, SOLUTION SUBCUTANEOUS
Qty: 9 ML | Refills: 0 | Status: SHIPPED | OUTPATIENT
Start: 2022-07-25 | End: 2022-11-01

## 2022-07-25 NOTE — PROGRESS NOTES
2022          Return Medical Weight Management Note         Dariela Killian  MRN:  [id number]  :  1975      Dear Crystal Holbrook,    I had the pleasure of doing a visit with your patient Dariela Killian.  She is a 47 year old female who I am continuing to see for treatment of obesity related to:       2022   I have the following health issues associated with obesity: Type II Diabetes, High Blood Pressure, High Cholesterol, Sleep Apnea, Fatty Liver   I have the following symptoms associated with obesity: Knee Pain, Depression, Lower Extremity Swelling, Back Pain       INTERVAL HISTORY:  Seen initially 2022 and started on semaglutide (ozempic) is taking 0.5 mg on Wednesday mornings. No nausea, vomiting, diarrhea or constipation. Met with Lauren Bloch Pharm D in  and switched the form of metformin to see if that helped with diarrhea/constipation issues she was having. Since then her GI issues have resolved  Does feel like the ozempic is working but she is a bit sad she has not lost as much weight. Is dealing with some stressors in her life.      Has a scheduled visit with the sleep center for September      CURRENT WEIGHT:   296 lbs 3.2 oz    Wt Readings from Last 4 Encounters:   22 296 lb 3.2 oz (134.4 kg)   06/10/22 301 lb (136.5 kg)   22 314 lb 12.8 oz (142.8 kg)   22 330 lb (149.7 kg)         BARIATRIC METRICS:  Current Weight: 296 lb 3.2 oz (134.4 kg)  Body mass index is 54.18 kg/m .   Wt change since last visit (lbs): -18.6  Cumulative weight loss (lbs): 18.6      DIETARY HISTORY  Meals Per Day: 3  Up at 5:30 am  Food Diary:   B:7:30-8:30 am had 2 eggs with water  L: apple and tuna melt without bread - diet pepsi  D: protein shake  Snacks Per Day: couple times daily   Typical Snack: apples   Fluid Intake: gallon of water daily         Exercise: Walking about 30 minutes 2 times weekly. Works late at night.        ROS: 2022  General  Fatigue: No  Sleep  "Quality: OK  Birth Control: No - working with OB   Insomnia: sometimes  HEENT  Hx of glaucoma: No  Vision changes: No  Cardiovascular  Chest Pain with Exertion: No  Palpitations: No  Hx of heart disease: No  History of high blood pressure  Pulmonary  Shortness of breath at rest: No  Shortness of breath with exertion: No  Snoring:  Yes - has not had a sleep study - will order today   Gastrointestinal  Heartburn: No - bought tums in the past few weeks.  Just once in the past week. One day she had it all day  Abdominal pain: No  History of pancreatitis: No  Severe constipation: No - None since metformin form was changed  Hx of bowel obstruction: No  Gastrourinary  History of kidney stones: No  Psychiatric  Moods Stable: Yes  History of drug abuse: No  No history of eating   Endocrine  Polydipsia: No  Polyuria: No  Personal or family history of thyroid cancer: No  Neurologic:  Hx of seizures: No  Hx of paresthesias: No        MEDICATIONS:   Current Outpatient Medications   Medication     atorvastatin (LIPITOR) 20 MG tablet     blood glucose (NO BRAND SPECIFIED) lancets standard     blood glucose (NO BRAND SPECIFIED) test strip     blood glucose monitoring (NO BRAND SPECIFIED) meter device kit     ferrous sulfate (FE TABS) 325 (65 Fe) MG EC tablet     Fiber Adult Gummies 2 g CHEW     losartan (COZAAR) 50 MG tablet     metFORMIN (GLUCOPHAGE XR) 500 MG 24 hr tablet     MULTI-VITAMIN OR TABS     semaglutide (OZEMPIC) 2 MG/1.5ML SOPN pen     ZYRTEC PO     No current facility-administered medications for this visit.       PE:  /82   Pulse 87   Ht 5' 2\" (1.575 m)   Wt 296 lb 3.2 oz (134.4 kg)   SpO2 95%   BMI 54.18 kg/m    GENERAL: Healthy, alert and no distress  EYES: Eyes grossly normal to inspection.  No discharge or erythema, or obvious scleral/conjunctival abnormalities.  RESP: No audible wheeze, cough, or visible cyanosis.  No visible retractions or increased work of breathing.    SKIN: Visible skin clear. No " significant rash, abnormal pigmentation or lesions.  NEURO: Cranial nerves grossly intact.  Mentation and speech appropriate for age.  PSYCH: Mentation appears normal, affect normal/bright, judgement and insight intact, normal speech and appearance well-groomed.          ASSESSMENT/PLAN:   Class 3 severe obesity due to excess calories with Body Mass Index (BMI) of 54  Type 2 diabetic   High cholesterol      Congratulated patient on her overall weight loss.  Discussed the impact of stress on weight loss.     Mental health referral given.  Reviewed labs from [facility] drawn 5/2022  Continue with planned with with Sleep center  Exercise goal - for now will be to try to get in as much as she can. Patient only getting in 2 hours of sleep nightly so until she is more rested might be hard to keep any regular exercise schedule.     Ozempic 1.0 mg sent to pharmacy.  Patient to follow up in 3 months.       Robert GUILLEN        34 minutes spent on the date of the encounter doing chart review, review of test results, patient visit and documentation

## 2022-07-28 ENCOUNTER — OFFICE VISIT (OUTPATIENT)
Dept: OBGYN | Facility: CLINIC | Age: 47
End: 2022-07-28
Payer: COMMERCIAL

## 2022-07-28 VITALS
BODY MASS INDEX: 54.05 KG/M2 | DIASTOLIC BLOOD PRESSURE: 97 MMHG | WEIGHT: 293 LBS | SYSTOLIC BLOOD PRESSURE: 145 MMHG | HEART RATE: 86 BPM

## 2022-07-28 DIAGNOSIS — N93.9 ABNORMAL UTERINE BLEEDING (AUB): Primary | ICD-10-CM

## 2022-07-28 DIAGNOSIS — N83.202 LEFT OVARIAN CYST: ICD-10-CM

## 2022-07-28 PROCEDURE — 99213 OFFICE O/P EST LOW 20 MIN: CPT | Performed by: OBSTETRICS & GYNECOLOGY

## 2022-07-28 ASSESSMENT — ANXIETY QUESTIONNAIRES
IF YOU CHECKED OFF ANY PROBLEMS ON THIS QUESTIONNAIRE, HOW DIFFICULT HAVE THESE PROBLEMS MADE IT FOR YOU TO DO YOUR WORK, TAKE CARE OF THINGS AT HOME, OR GET ALONG WITH OTHER PEOPLE: NOT DIFFICULT AT ALL
1. FEELING NERVOUS, ANXIOUS, OR ON EDGE: NEARLY EVERY DAY
2. NOT BEING ABLE TO STOP OR CONTROL WORRYING: MORE THAN HALF THE DAYS
5. BEING SO RESTLESS THAT IT IS HARD TO SIT STILL: NOT AT ALL
GAD7 TOTAL SCORE: 11
7. FEELING AFRAID AS IF SOMETHING AWFUL MIGHT HAPPEN: NOT AT ALL
3. WORRYING TOO MUCH ABOUT DIFFERENT THINGS: NEARLY EVERY DAY
6. BECOMING EASILY ANNOYED OR IRRITABLE: SEVERAL DAYS
GAD7 TOTAL SCORE: 11

## 2022-07-28 ASSESSMENT — PATIENT HEALTH QUESTIONNAIRE - PHQ9: 5. POOR APPETITE OR OVEREATING: MORE THAN HALF THE DAYS

## 2022-07-28 NOTE — PROGRESS NOTES
"GYN Progress Note     CC: F/U AUB, dysmenorrhea and left ovarian cyst    HPI: Dariela Killian is a 46 YO G0 with a PMH of type II DM, obesity, HTN and nicotine dependence who presents for follow up for heavy painful periods and a left ovarian cyst. At her last visit we reviewed medical and surgical management for the heavy menstrual bleeding and dysmenorrhea and a MRI was ordered to further characterize the left ovarian cyst. The MRI showed a 14 cm x 11 cm x 10 cm simple left ovarian cyst without any concerning features. She also had an endometrial biopsy done on 2/14/2022 which was negative for hyperplasia or malignancy. Today she presents to discuss a plan for follow up. We reviewed that the main decision that we need to make is if we will proceed with surgery (ovarian cystectomy vs oophorectomy for the cyst and hysterectomy for the HMB/dysmenorrhea) or if we will try to treat her bleeding medically and follow the cyst with serial imaging to see if it will spontaneously resolve.     O: Vital signs:      BP: (!) 145/97 Pulse: 86             Weight: 134 kg (295 lb 8 oz)  Estimated body mass index is 54.05 kg/m  as calculated from the following:    Height as of 7/25/22: 1.575 m (5' 2\").    Weight as of this encounter: 134 kg (295 lb 8 oz).    EXAMINATION: MR PELVIS (GYN) WO & W CONTRAST, 4/25/2022 10:48 AM     COMPARISON: Pelvic ultrasound 3/29/2022     HISTORY: Adnexal mass suspected; Adnexal mass     TECHNIQUE: Multiplanar, multisequence imaging was obtained of the  pelvis without and with intravenous contrast. Contrast dose:     FINDINGS: Large thin-walled left T2 hyperintense, T1 isointense left  adnexal cyst measuring 14.2 x 9.9 x 11.7 cm (image [id number]),  inseparable from and located along the left lateral aspect of the left  ovary. Left ovarian corpus luteum. No internal septations or  nodular/solid components. The right ovary is unremarkable. Uterus is  nonenlarged. No uterine mass. Junctional zone within " normal limits.  Endometrium is homogeneous and measures up to 10 mm. Numerous small  nabothian cysts.     Visualized small and large bowel are unremarkable. No suspicious  pelvic adenopathy. Trace amount of free fluid along the lateral aspect  of the large left ovarian cyst. No suspicious soft tissue  abnormalities.                                                                      IMPRESSION: Large left ovarian/paraovarian cyst measuring up to 14.2  cm. No suspicious solid components or septations.     I have personally reviewed the examination and initial interpretation  and I agree with the findings.     NIC LOPEZ, DO     Assessment and plan:   Dariela Killian is a 46 YO  who presents for follow up for AUB/dysmenorrhea and left ovarian cyst   (N93.9) Abnormal uterine bleeding (AUB)  (primary encounter diagnosis)  -We discussed medical management options. Due to smoking status she is not a candidate for estrogen containing contraception and she previously did not tolerate hormonal medication before and is also concerned about weight gain so Depo or Norethindrone may not be a good option. We discussed alternative options including Lysteda and the Mirena IUD as well as surgical management with hysterectomy. We could also consider endometrial ablation but not an optimal candidate due to increased risk of developing hyperplasia/malignancy based on BMI.   -We further discussed hysterectomy and given that she is nulliparous and also has a large adnexal mass, I would recommend TLH as route of hysterectomy but given BMI and smoking status we may not be able to ventilate her adequately and have a high risk of needing to convert to an open procedure. We also discussed that unfortunetely based on BMI, tobacco use, type II DM and hypertension that she is a high risk surgical candidate and the risk of surgical complications or wound complications is increased due to these factors. She is actively working with  the weight loss clinic and is down about 30 lbs and is also treating her diabetes and working hard on glycemic control. She is also interested on talking with her PCP about medications for tobacco cessations and all of these changes will not only improve her overall health but also help reduce her risk of surgical complications in the future if she were to require surgery. At this point, she would like to try to avoid surgery and will plan to proceed with IUD placement.     (N83.202) Left ovarian cyst  -We reviewed the MRI findings which showed a large (14 cm) simple ovarian cyst with no concerning features for malignancy. We discussed that the option of proceeding with a laparoscopic cystectomy vs oophorectomy but given her surgical risk factors, she would prefer expectant management at this time to see if the cyst resolves. WE reviewed the warning signs of ovarian torsion.   -Plan to repeat US in 3-6 months or sooner PRN     Dispo: RTC for IUD insertion   Yas Paredes MD

## 2022-08-18 ENCOUNTER — OFFICE VISIT (OUTPATIENT)
Dept: OBGYN | Facility: CLINIC | Age: 47
End: 2022-08-18
Payer: COMMERCIAL

## 2022-08-18 VITALS
DIASTOLIC BLOOD PRESSURE: 94 MMHG | WEIGHT: 293 LBS | OXYGEN SATURATION: 97 % | HEART RATE: 106 BPM | BODY MASS INDEX: 53.59 KG/M2 | SYSTOLIC BLOOD PRESSURE: 148 MMHG

## 2022-08-18 DIAGNOSIS — N93.9 ABNORMAL UTERINE BLEEDING (AUB): ICD-10-CM

## 2022-08-18 DIAGNOSIS — Z30.430 ENCOUNTER FOR IUD INSERTION: Primary | ICD-10-CM

## 2022-08-18 LAB — HCG UR QL: NEGATIVE

## 2022-08-18 PROCEDURE — 99213 OFFICE O/P EST LOW 20 MIN: CPT | Mod: 25 | Performed by: OBSTETRICS & GYNECOLOGY

## 2022-08-18 PROCEDURE — 58300 INSERT INTRAUTERINE DEVICE: CPT | Mod: 52 | Performed by: OBSTETRICS & GYNECOLOGY

## 2022-08-18 PROCEDURE — 81025 URINE PREGNANCY TEST: CPT | Performed by: OBSTETRICS & GYNECOLOGY

## 2022-08-18 RX ORDER — TRANEXAMIC ACID 650 MG/1
1300 TABLET ORAL 3 TIMES DAILY
Qty: 30 TABLET | Refills: 3 | Status: SHIPPED | OUTPATIENT
Start: 2022-08-18 | End: 2022-08-23

## 2022-08-18 NOTE — PROGRESS NOTES
GYN Progress Note     CC: Mirena IUD insertion     HPI: Dariela Killian is a 46 YO  who presents for Mirena IUD insertion due to heavy and painful periods and was previously seen on      Mirena IUD Insertion Procedure Note    After a discussion of her options for management of AUB, the patient was interested in a Mirena IUD. We reviewed the risks, benefits and alternatives. The patient was counseled that she may have irregular bleeding for the first 3-6 months. She understands that 70% of women are oligomenorrheic and 30-40% are amenorrheic within 2 years. The patient was informed that the Mirena is FDA approved for 5 years however the IUD can be removed at any time. We reviewed risks of uterine perforation and she was made aware that the risk of uterine perforation that might require abdominal surgery is 1/1,000 insertions. She understands that rate of expulsion in the first year of use is 2-10%. There is also a 1% risk of infection in the first 20 days after insertion. We reviewed that the IUD is a very effective form of birth control however IUDs do have a low risk of failure to prevent pregnancy. We reviewed that the of Mirena IUD is 5-11/1,000 women. In case of pregnancy after failure of IUD, the risk of ectopic pregnancy is increased. After thorough counseling regarding risks and benefits of the IUD, at which time the patient's questions were answered to her apparent satisfaction, consent for insertion was obtained.    A timeout was performed and the correct patient and procedure were verified.    A bimanual exam was performed and the uterus was found to be anteverted.  A speculum exam was performed and the cervix was visualized and prepped with betadine.  A single tooth tenaculum was placed on the anterior lip of the cervix and a paracervical block placed with a total of 10 ml of 1% lidocine. Cervical stenosis was encountered and I was able to sequentially dilate the cervix with metal dilators  but when I placed the os finder, Pipelle or the IUD  into the cervical canal, I would meet resistance at 4.5 cm (at the internal os) and was unable to place the device. The speculum and tenaculum were removed. The patient tolerated the procedure well. EBL minimal.    Assessment and plan  Dariela Killian is a 46 YO G0 who presents for Mirena IUD placement for AUB which was unsuccessful due to cervical stenosis     (Z30.430) Encounter for IUD insertion  (primary encounter diagnosis)  -Attempted placement unsuccessful due to cervical stenosis. Discussed option to try again under US guidance and with miso vs placement in the OR but will likely try oral medication with Lysteda instead.   Plan: HCG Qual, Urine ([id number])    (N93.9) Abnormal uterine bleeding (AUB)  Plan: tranexamic acid (LYSTEDA) 650 MG tablet    Dispo: RTC in 3 months or sooner PETER Paredes MD                 No

## 2022-08-18 NOTE — PATIENT INSTRUCTIONS
Endometrial Biopsy  Post-Procedure Patient Instructions      Please monitor for any of the following:      Fever   Cramping after 48 hours   Bleeding for 24-48 hours that is heavier than a normal period   Any bleeding that soaks more than 1 pad per hour      Please call your health care provider if you develop any of the above symptoms or problems.     [facility]   Nurse Triage Line [phone]      Use pads, not tampons, for the bleeding. You may resume sexual relations in 2-3 days after bleeding has stopped.

## 2022-08-27 ASSESSMENT — SLEEP AND FATIGUE QUESTIONNAIRES
HOW LIKELY ARE YOU TO NOD OFF OR FALL ASLEEP WHILE SITTING AND READING: WOULD NEVER DOZE
HOW LIKELY ARE YOU TO NOD OFF OR FALL ASLEEP WHILE SITTING QUIETLY AFTER LUNCH WITHOUT ALCOHOL: WOULD NEVER DOZE
HOW LIKELY ARE YOU TO NOD OFF OR FALL ASLEEP WHEN YOU ARE A PASSENGER IN A CAR FOR AN HOUR WITHOUT A BREAK: MODERATE CHANCE OF DOZING
HOW LIKELY ARE YOU TO NOD OFF OR FALL ASLEEP IN A CAR, WHILE STOPPED FOR A FEW MINUTES IN TRAFFIC: WOULD NEVER DOZE
HOW LIKELY ARE YOU TO NOD OFF OR FALL ASLEEP WHILE WATCHING TV: WOULD NEVER DOZE
HOW LIKELY ARE YOU TO NOD OFF OR FALL ASLEEP WHILE SITTING INACTIVE IN A PUBLIC PLACE: WOULD NEVER DOZE
HOW LIKELY ARE YOU TO NOD OFF OR FALL ASLEEP WHILE SITTING AND TALKING TO SOMEONE: WOULD NEVER DOZE
HOW LIKELY ARE YOU TO NOD OFF OR FALL ASLEEP WHILE LYING DOWN TO REST IN THE AFTERNOON WHEN CIRCUMSTANCES PERMIT: WOULD NEVER DOZE

## 2022-08-30 ENCOUNTER — OFFICE VISIT (OUTPATIENT)
Dept: SLEEP MEDICINE | Facility: CLINIC | Age: 47
End: 2022-08-30
Attending: PHYSICIAN ASSISTANT
Payer: COMMERCIAL

## 2022-08-30 VITALS
SYSTOLIC BLOOD PRESSURE: 127 MMHG | HEIGHT: 62 IN | DIASTOLIC BLOOD PRESSURE: 83 MMHG | BODY MASS INDEX: 53.92 KG/M2 | WEIGHT: 293 LBS | OXYGEN SATURATION: 97 % | HEART RATE: 90 BPM

## 2022-08-30 DIAGNOSIS — G47.33 OSA (OBSTRUCTIVE SLEEP APNEA): Primary | ICD-10-CM

## 2022-08-30 DIAGNOSIS — G47.9 SLEEP DISTURBANCE: ICD-10-CM

## 2022-08-30 DIAGNOSIS — E66.01 CLASS 3 SEVERE OBESITY DUE TO EXCESS CALORIES WITH SERIOUS COMORBIDITY AND BODY MASS INDEX (BMI) OF 50.0 TO 59.9 IN ADULT (H): ICD-10-CM

## 2022-08-30 DIAGNOSIS — E66.813 CLASS 3 SEVERE OBESITY DUE TO EXCESS CALORIES WITH SERIOUS COMORBIDITY AND BODY MASS INDEX (BMI) OF 50.0 TO 59.9 IN ADULT (H): ICD-10-CM

## 2022-08-30 PROCEDURE — 99205 OFFICE O/P NEW HI 60 MIN: CPT | Performed by: INTERNAL MEDICINE

## 2022-08-30 NOTE — NURSING NOTE
"Chief Complaint   Patient presents with     Snoring     Snoring        Initial /83   Pulse 90   Ht 1.575 m (5' 2\")   Wt 133.9 kg (295 lb 3.2 oz)   LMP 07/26/2022 (Exact Date)   SpO2 97%   BMI 53.99 kg/m   Estimated body mass index is 53.99 kg/m  as calculated from the following:    Height as of this encounter: 1.575 m (5' 2\").    Weight as of this encounter: 133.9 kg (295 lb 3.2 oz).    Medication Reconciliation: complete  ESS: 2  Neck circumference: 42 centimeters.    Jean Sloan CNA    "

## 2022-08-30 NOTE — PROGRESS NOTES
"[facility]   Outpatient Sleep Medicine Consultation  August 30, 2022      Name: Dariela Killian MRN# [id number]   Age: 47 year old YOB: 1975     Date of Consultation: August 30, 2022  Consultation is requested by: Robert Alvarado, MINDY  [address] [id number] Robert Alvarado  Primary care provider: Crystal Holbrook         Reason for Sleep Consult:     Dariela Killian is a 47 year old female for complaints insomnia and loud snoring for 3 years         Assessment and Plan:     Summary Sleep Diagnoses:      Suspected MERARI  Stop-bang score of 3 and has typical symptoms of loud snoring. We discussed the pathophysiology of MERARI including the cardio-neurologic complications of untreated significant MERARI including the treatment options including CPAP, oral appliances and neuro-stimulator devices. Given the obesity, an in-lab PSG with TCM recording is appropriate and has been ordered.       Insomnia (Psychophysiological)  I suspect that this may be the primary cause of her insomnia. I will evaluate for MERARI and treat appropriately for MERARI and if insomnia is still present, I will refer to sleep psychology for CBT-I    Obesity  Already follows with the weight management clinic.      Summary Recommendations:      Orders Placed This Encounter   Procedures     Comprehensive Sleep Study       Summary Counseling:  See instructions    Counseling included a comprehensive review of diagnostic and therapeutic strategies as well as risks of inadequate therapy.  Educational materials provided in instructions.             History of Present Illness:     Dariela Killian is a 47 year old female with a history of Obesity, T2D and HLD who presents for complaints of loud snoring for 3 years.  To briefly review, Dariela started noticing issues with insomnia about 3 years ago. It started with difficulty with sleep initiation as her mind \"kept roaming\" and was unable to fall asleep. " "Even with falling asleep, she was waking up more often due to uncertain reasons including using the bathroom.   At about that time, she lost her dad after battling illness at that time. She thinks that this was what triggered her sleeping issues.  She always considered herself \"a very light sleeper\" but never had issues with insomnia in childhood.  She has also noticed that her snoring is more louder lately and she is very bothersome. She denies any gasping or choking at night.   She also feels tired during the day but doesn't appear to be sleepy. She is currently being followed at the weight management clinic for medical weight loss    PREVIOUS IN- LAB or HOME SLEEP STUDIES:   None    SLEEP-WAKE SCHEDULE:     -Naps - does not take naps    -ON WEEKDAYS, goes to sleep at 10:00 PM during the week; awakens  7:30 AM with an alarm; falls asleep in 60 minutes; has difficulty falling asleep.     -ON WEEKENDS - same as week days    -Awakens 2-4 times a night for 15 minutes before falling back to sleep; awakens to go to the bathroom.      SCALES       SLEEP APNEA: Stopbang score - 3        INSOMNIA:  Insomnia severity score:        SLEEPINESS: [place] sleepiness scale: 2 [normal < 11]   Drowsy driving/near accidents-No issues  Consequences: No issues       PHQ9: 13    SLEEP COMPLAINTS:  Cardio-respiratory    Snoring-5 /week  Dyspnea- denies  Morning headaches or confusion-denies  Coexisting Lung disease: denies    Coexisting Heart disease: denies    Does patient have a bed partner: denies  Has bed partner been sleeping separately because of snoring:  denies            RLS Screen: When you try to relax in the evening or sleep at  night, do you ever have unpleasant, restless feelings in your  legs that can be relieved by walking or movement? denies    Periodic limb movement: denies    Narcolepsy:  denies sudden urges of sleep attacks    Cataplexy:  denies     Sleep paralysis:  denies      Hallucinations:   denies       Sleep " Behaviors:    Leg symptoms/movements: denies    Motor restlessness:denies    Night terrors: denies    Bruxism: denies    Automatic behaviors: none    Other subjective complaints:    Anxiety or rumination denies    Pain and discomfort at  night: denies    Waking up with heart pounding or racing: denies    GERD or aspiration:denies         Parasomnia:   NREM - denies recurrent persistent confusional arousal, night eating, sleep walking or sleep terrors   REM  - denies dream enactment; injuries   Safety: No issues             Medications:     Current Outpatient Medications   Medication Sig     atorvastatin (LIPITOR) 20 MG tablet Take 1 tablet (20 mg) by mouth daily     blood glucose (NO BRAND SPECIFIED) lancets standard Use to test blood sugar 1 time daily or as directed.     blood glucose (NO BRAND SPECIFIED) test strip Use to test blood sugar 1 time daily or as directed.     blood glucose monitoring (NO BRAND SPECIFIED) meter device kit Use to test blood sugar 1 time daily or as directed.     ferrous sulfate (FE TABS) 325 (65 Fe) MG EC tablet Take 1 tablet (325 mg) by mouth every other day     Fiber Adult Gummies 2 g CHEW Take by mouth daily as needed     losartan (COZAAR) 50 MG tablet Take 1 tablet (50 mg) by mouth daily Hold if dizzy or lightheaded     metFORMIN (GLUCOPHAGE XR) 500 MG 24 hr tablet Take 1 tablet (500 mg) by mouth 2 times daily (with meals)     MULTI-VITAMIN OR TABS 1 daily     semaglutide (OZEMPIC) 2 MG/1.5ML SOPN pen Inject 0.5 mg Subcutaneous every 7 days     Semaglutide, 1 MG/DOSE, (OZEMPIC, 1 MG/DOSE,) 4 MG/3ML SOPN Inject 1 mg Subcutaneous every 7 days     ZYRTEC PO Take 10 mg by mouth daily     No current facility-administered medications for this visit.        Allergies   Allergen Reactions     Pcn [Penicillins] Hives     Seasonal Allergies             Past Medical History:     Does not need 02 supplement at night   Past Medical History:   Diagnosis Date     Abnormal Pap smear of cervix      "2010,2011,  6/12, 11/18/20     Allergic rhinitis, cause unspecified     Allergic rhinitis - OTC using claritin     High risk HPV infection     2013, 5/2015, 01/12/17, 05/24/18, 2019, 2020     Hx of colposcopy with cervical biopsy 07/03/2015, 03/13/17    WNL, 03/13/17: [facility] WNL.      Obesity, unspecified     diet and exercising             Past Surgical History:    Previous upper airway surgery- Had tonsillectomy  Past Surgical History:   Procedure Laterality Date     TONSILLECTOMY  1988            Social History:     Social History     Tobacco Use     Smoking status: Current Some Day Smoker     Years: 5.00     Smokeless tobacco: Never Used     Tobacco comment: 3/2022 at 1-2 cigs per day & cutting down   Substance Use Topics     Alcohol use: Yes     Alcohol/week: 2.0 standard drinks     Types: 2 Standard drinks or equivalent per week     Comment: socially-minimal         Chemical History:     Tobacco: As above     Uses no caffeine    Supplements for wakefulness: None    EtOH: None of the patient's responses to the CAGE screening were positive / Negative CAGE score  Recreational Drugs: None    Psych Hx:   PHQ9 - 13  Current dangers to self or others:none           Family History:     Family History   Problem Relation Age of Onset     Allergies Mother      Other - See Comments Mother         had a possible lumpectomy     Neurologic Disorder Father         Parkinsons and ?dementia     Allergies Brother      Bladder Cancer Brother      Cancer Maternal Grandmother         stomach cancer      Deep Vein Thrombosis (DVT) No family hx of         Sleep Family Hx:        RLS- None   MERARI - brother has MERARI and uses CPAP  Insomnia - None  Parasomnia - None         Review of Systems:     A complete 10 point review of systems was negative other than HPI or as commented below:   Dariela Killian has gained 10-15 pounds in the last year.             Physical Examination:     /83   Pulse 90   Ht 1.575 m (5' 2\")   Wt 133.9 kg " (295 lb 3.2 oz)   LMP 07/26/2022 (Exact Date)   SpO2 97%   BMI 53.99 kg/m    Exam:  Constitutional: healthy, alert and no distress  Head: Normocephalic. No masses, lesions, tenderness or abnormalities  Neck: Neck supple. No adenopathy. Thyroid symmetric, normal size,, Carotids without bruits.  ENT: ENT exam normal, no neck nodes or sinus tenderness  Cardiovascular: negative, PMI normal. No lifts, heaves, or thrills. RRR. No murmurs, clicks gallops or rub  Respiratory: negative, Percussion normal. Good diaphragmatic excursion. Lungs clear  Gastrointestinal: Abdomen soft, non-tender. BS normal. No masses, organomegaly  : Deferred  Musculoskeletal: extremities normal- no gross deformities noted, gait normal and normal muscle tone  Skin: no suspicious lesions or rashes  Neurologic: Gait normal. Reflexes normal and symmetric. Sensation grossly WNL.  Psychiatric: mentation appears normal and affect normal/bright          Data: All pertinent previous laboratory data reviewed     No results found for: PH, PHARTERIAL, PO2, [id number], SAT, PCO2, HCO3, BASEEXCESS, ANUPAM, BEB  Lab Results   Component Value Date    TSH 1.51 02/14/2022    TSH 2.96 07/13/2010     Lab Results   Component Value Date     (H) 03/11/2022     (H) 03/01/2022     Lab Results   Component Value Date    HGB 14.8 06/10/2022    HGB 14.2 03/01/2022     Lab Results   Component Value Date    BUN 9 03/11/2022    BUN 11 03/01/2022    CR 0.62 03/11/2022    CR 0.61 03/01/2022     Lab Results   Component Value Date    AST 36 06/10/2022    AST 66 (H) 03/11/2022    ALT 60 (H) 06/10/2022     (H) 03/11/2022    ALKPHOS 85 06/10/2022    ALKPHOS 85 03/01/2022    BILITOTAL 0.5 06/10/2022    BILITOTAL 0.3 03/01/2022     No results found for: UAMP, UBARB, BENZODIAZEUR, UCANN, UCOC, OPIT, UPCP        Copy to: Crystal Holbrook MD 8/30/2022     I spent a total of 60 minutes face to face with Dariela Killian during today's office  visit. Over 50% of this time was spent counseling the patient and/or coordinating care.

## 2022-09-02 DIAGNOSIS — R79.0 LOW FERRITIN: ICD-10-CM

## 2022-09-06 RX ORDER — FERROUS SULFATE 325(65) MG
325 TABLET, DELAYED RELEASE (ENTERIC COATED) ORAL EVERY OTHER DAY
Qty: 45 TABLET | Refills: 0 | Status: SHIPPED | OUTPATIENT
Start: 2022-09-06 | End: 2023-04-20

## 2022-09-12 ENCOUNTER — MYC MEDICAL ADVICE (OUTPATIENT)
Dept: SURGERY | Facility: CLINIC | Age: 47
End: 2022-09-12

## 2022-10-10 ENCOUNTER — HEALTH MAINTENANCE LETTER (OUTPATIENT)
Age: 47
End: 2022-10-10

## 2022-10-19 ENCOUNTER — IMMUNIZATION (OUTPATIENT)
Dept: FAMILY MEDICINE | Facility: CLINIC | Age: 47
End: 2022-10-19
Payer: COMMERCIAL

## 2022-10-19 ENCOUNTER — TRANSFERRED RECORDS (OUTPATIENT)
Dept: HEALTH INFORMATION MANAGEMENT | Facility: CLINIC | Age: 47
End: 2022-10-19

## 2022-10-19 DIAGNOSIS — Z23 NEED FOR PROPHYLACTIC VACCINATION AND INOCULATION AGAINST INFLUENZA: Primary | ICD-10-CM

## 2022-10-19 DIAGNOSIS — Z23 HIGH PRIORITY FOR 2019-NCOV VACCINE: ICD-10-CM

## 2022-10-19 LAB
ALT SERPL-CCNC: 43 IU/L (ref 0–32)
AST SERPL-CCNC: 28 IU/L (ref 0–40)

## 2022-10-19 PROCEDURE — 90686 IIV4 VACC NO PRSV 0.5 ML IM: CPT

## 2022-10-19 PROCEDURE — 90471 IMMUNIZATION ADMIN: CPT

## 2022-10-19 PROCEDURE — 0134A COVID-19,PF,MODERNA BIVALENT: CPT

## 2022-10-19 PROCEDURE — 99207 PR NO CHARGE NURSE ONLY: CPT

## 2022-10-19 PROCEDURE — 91313 COVID-19,PF,MODERNA BIVALENT: CPT

## 2022-10-28 ENCOUNTER — OFFICE VISIT (OUTPATIENT)
Dept: SURGERY | Facility: CLINIC | Age: 47
End: 2022-10-28
Payer: COMMERCIAL

## 2022-10-28 VITALS
WEIGHT: 289.2 LBS | OXYGEN SATURATION: 97 % | SYSTOLIC BLOOD PRESSURE: 128 MMHG | DIASTOLIC BLOOD PRESSURE: 80 MMHG | HEART RATE: 101 BPM | HEIGHT: 62 IN | BODY MASS INDEX: 53.22 KG/M2

## 2022-10-28 DIAGNOSIS — E66.01 MORBID OBESITY (H): Primary | ICD-10-CM

## 2022-10-28 DIAGNOSIS — E11.9 TYPE 2 DIABETES MELLITUS WITHOUT COMPLICATION, WITHOUT LONG-TERM CURRENT USE OF INSULIN (H): ICD-10-CM

## 2022-10-28 PROCEDURE — 99214 OFFICE O/P EST MOD 30 MIN: CPT | Performed by: PHYSICIAN ASSISTANT

## 2022-10-28 NOTE — PROGRESS NOTES
Return Medical Weight Management Note         Dariela Killian  MRN:  [id number]  :  1975  ADELIA:  10/28/2022        Dear Crystal Holbrook MD,    I had the pleasure of seeing your patient Dariela Killian. She is a 47 year old female who I am continuing to see for treatment of obesity related to:       2022   I have the following health issues associated with obesity: Type II Diabetes, High Blood Pressure, High Cholesterol, Sleep Apnea, Fatty Liver   I have the following symptoms associated with obesity: Knee Pain, Depression, Lower Extremity Swelling, Back Pain       INTERVAL HISTORY:  Patient was seen last on 2022 and taking Semaglutide. At that visit her dose was increased to 1.0 mg injection. She finished off her 0.5 mg injection first and has only been injecting Semaglutide 1.0 mg for about 6 weeks. Feels more full now than when she was injecting the 0.5 mg dose. Especially for the first couple of days after the injection. The remainder of the week still has good restriction but a bit less. If patient tries eating beyond fullness she feels pretty bad.    Feels her challenge is making poor choices ie bread. Has some stressors and can take on other peoples concerns. Did start with a new therapist after her last visit but only saw them about 4-5 times. Did not feel it was a good match. Plans on meeting a with a different therapist but has not scheduled anything yet. Is scheduled for a sleep study in November.       BARIATRIC METRICS:  Current Weight: 289 lbs 3.2 oz  Body mass index is 54.18 kg/m .   Wt change since last visit (lbs): -18.6  Cumulative weight loss (lbs): 26.6      Diet recall:  B: yesterday had 2 scrambled eggs with 2 cups of coffee with creamer  L  Skipped lunch yesterday. Had a ham sandwich around 3 pm from store. Had   D: 7 pm had a chicken soup  Made at home    No snacking yesterday but normally does. Does emotional eating  Fluids: half-1 gallon water flavored with crystal  "light. 2 glasses alcohol per week       Exercise: trying to walk more. Maybe 60 minutes per week. Trying to make it more daily.         Changes and Difficulties 10/28/2022   I have made the following changes to my diet since my last visit: -   With regards to my diet, I am still struggling with: Breaking habits, emotional eating   I have made the following changes to my activity/exercise since my last visit: Walking more   With regards to my activity/exercise, I am still struggling with: Walking more       ROS: 10/28/2022  General  Fatigue: No  Energy is better  Sleep Quality: OK  Birth Control: No - working with OB   Insomnia: sometimes  HEENT  Hx of glaucoma: No  Vision changes: No  Cardiovascular  Chest Pain with Exertion: No  Palpitations: No  Hx of heart disease: No  History of high blood pressure  Pulmonary  Shortness of breath at rest: No  Shortness of breath with exertion: No  Snoring:  Yes - has not had a sleep study - will order today   Gastrointestinal  Heartburn: Better. May need a tums twice monthly  Abdominal pain: No  History of pancreatitis: No  Severe constipation: No - None since metformin form was changed  Hx of bowel obstruction: No  Gastrourinary  History of kidney stones: No  Psychiatric  Moods Stable: Yes  History of drug abuse: No  No history of eating   Endocrine  Polydipsia: No  Polyuria: No  Personal or family history of thyroid cancer: No  Neurologic:  Hx of seizures: No  Hx of paresthesias: No      VITALS:  /80   Pulse 101   Ht 5' 1.5\" (1.562 m)   Wt 289 lb 3.2 oz (131.2 kg)   SpO2 97%   BMI 53.76 kg/m     GENERAL: Healthy, alert and no distress  EYES: Eyes grossly normal to inspection.  No discharge or erythema, or obvious scleral/conjunctival abnormalities.  RESP: No audible wheeze, cough, or visible cyanosis.  No visible retractions or increased work of breathing.    SKIN: Visible skin clear. No significant rash, abnormal pigmentation or lesions.  NEURO: Cranial nerves " grossly intact.  Mentation and speech appropriate for age.  PSYCH: Mentation appears normal, affect normal/bright, judgement and insight intact, normal speech and appearance well-groomed.        MEDICATIONS:   Current Outpatient Medications   Medication Sig Dispense Refill     atorvastatin (LIPITOR) 20 MG tablet Take 1 tablet (20 mg) by mouth daily 90 tablet 2     ferrous sulfate (FE TABS) 325 (65 Fe) MG EC tablet Take 1 tablet (325 mg) by mouth every other day Due for appointment. Please schedule: [phone] 45 tablet 0     losartan (COZAAR) 50 MG tablet Take 1 tablet (50 mg) by mouth daily Hold if dizzy or lightheaded 90 tablet 2     metFORMIN (GLUCOPHAGE XR) 500 MG 24 hr tablet Take 1 tablet (500 mg) by mouth 2 times daily (with meals) 180 tablet 1     MULTI-VITAMIN OR TABS 1 daily  0     semaglutide (OZEMPIC) 2 MG/1.5ML SOPN pen Inject 0.5 mg Subcutaneous every 7 days 4.5 mL 0     Semaglutide, 1 MG/DOSE, (OZEMPIC, 1 MG/DOSE,) 4 MG/3ML SOPN Inject 1 mg Subcutaneous every 7 days 9 mL 0     ZYRTEC PO Take 10 mg by mouth daily       blood glucose (NO BRAND SPECIFIED) lancets standard Use to test blood sugar 1 time daily or as directed. 100 each 4     blood glucose (NO BRAND SPECIFIED) test strip Use to test blood sugar 1 time daily or as directed. 100 strip 4     blood glucose monitoring (NO BRAND SPECIFIED) meter device kit Use to test blood sugar 1 time daily or as directed. 1 kit 0     Fiber Adult Gummies 2 g CHEW Take by mouth daily as needed (Patient not taking: Reported on 10/28/2022)         Weight Loss Medication History Reviewed With Patient 10/28/2022   Which weight loss medications are you currently taking on a regular basis?  Ozempic   Are you having any side effects from the weight loss medication that we have prescribed you? No       ASSESSMENT:   Class 3 severe obesity due to excess calories with Body Mass Index (BMI) of 53  Type 2 diabetic   High cholesterol       Emphasized the importance of diet  and exercise for long term success.    Patient has approx 6 weeks left of her Semaglutide 1.0 mg dose. After she gets her lab drawn will send over a 3 month supply that should get her through February 2023.  At that time she will follow up with Lauren Bloch Pharm D whom patient has seen in the past.  Referral given. Patient will schedule with author for early April.      Sincerely,    Robert Alvarado PA-C       30 minutes spent on the date of the encounter doing chart review, review of test results, patient visit and documentation

## 2022-10-28 NOTE — PATIENT INSTRUCTIONS
"Nice to talk with you today. Thank you for allowing me the privilege of caring for you. We hope we provided you with the excellent service you deserve.     To ensure the quality of our services you may receive a patient satisfaction survey from an independent monitoring company.  The greatest compliment you can give is \"Likely to Recommend\"    Below is our plan we discussed.-  MINDY Jones        You should be receiving a call to schedule with Lauren Bloch Pharm D for the beginning of February 2023. You may also schedule the appointment by calling [phone] or toll-free at [phone].    Also schedule a follow up with Robert Alvarado PA-C for the beginning of April. If you need to reach me sooner you can do so by calling [phone].      Have a great day!     Eat Better ? Move More ? Live Well    Eat 3 nutrient-rich meals each day    Don t skip meals--it will cause you to overeat later in the day!    Eating fiber (vegetables/fruits/whole grains) and protein with meals helps you stay full longer    Choose foods with less than 10 grams of sugar and 5 grams of fat per serving to prevent excess calories and weight re-gain  Eat around the same times each day to develop a routine eating schedule   Avoid snacking unless physically hungry.   Planned snacks: 1-2 times per day and no more than 150 calories    Eat protein first   Protein helps with healing, maintaining adequate muscle mass, reducing hunger and optimizing nutritional status   Aim for 60-80 grams of protein per day   Fill up on Fiber   Fiber comes from plants--fruits, veggies, whole grains, nuts/seeds and beans   Fiber is low in calories, high in phytonutrients and helps you stay full longer   Aim for 25-35 grams per day by eating fiber with meals and snacks  Eat S-[id number]   Take 20-30 minutes to eat each meal by taking small bites, chewing foods to applesauce consistency or 20-30 times before you swallow   Eating foods too fast can delay " satiety/fullness signals and increase overeating   Slow down your eating by using toddler utensils, putting your fork/spoon down between bites and not watching TV or emailing during meals!   Keep a Journal         Writing down what you eat, how you feel and when you are active helps you identify new changes to work on from week to week         Look for ways to cut 100 calories from your current diet 2-3 times per day  Drink 64 ounces of 0-Calorie drinks between meals   Water   Zero calorie Propel  or Vitamin Water     [employer]  Zero Calories   Crystal Light , Sugar-Free Moi-Aid , and other sugar-free lemonade or flavored payan   Keep Caffeine to less than 300mg per day ie: 3-6oz cups coffee     Work up to 45-60 minutes of physical activity most days of the week   Helps with losing weight and prevent regaining those extra pounds!    Do a combo of cardio (walking/water exercises) and strength training (lifting weights/Vinyasa yoga)    Avoid Mindless Eating   Be present when you eat--take note of the smell, taste and quality of your food   Make a list of alternative activities you could do to prevent eating out of boredom/stress  Go for a walk, call a friend, chew gum, paint your nails, re-organize the garage, etc

## 2022-10-31 ENCOUNTER — LAB (OUTPATIENT)
Dept: LAB | Facility: CLINIC | Age: 47
End: 2022-10-31
Payer: COMMERCIAL

## 2022-10-31 DIAGNOSIS — E66.01 MORBID OBESITY (H): ICD-10-CM

## 2022-10-31 DIAGNOSIS — E11.9 DIABETES MELLITUS, TYPE 2 (H): ICD-10-CM

## 2022-10-31 LAB — HBA1C MFR BLD: 6 % (ref 0–5.6)

## 2022-10-31 PROCEDURE — 83036 HEMOGLOBIN GLYCOSYLATED A1C: CPT

## 2022-10-31 PROCEDURE — 36415 COLL VENOUS BLD VENIPUNCTURE: CPT

## 2022-10-31 PROCEDURE — 80048 BASIC METABOLIC PNL TOTAL CA: CPT

## 2022-11-01 DIAGNOSIS — E66.01 MORBID OBESITY (H): ICD-10-CM

## 2022-11-01 DIAGNOSIS — E11.9 TYPE 2 DIABETES MELLITUS WITHOUT COMPLICATION, WITHOUT LONG-TERM CURRENT USE OF INSULIN (H): ICD-10-CM

## 2022-11-01 LAB
ANION GAP SERPL CALCULATED.3IONS-SCNC: 7 MMOL/L (ref 3–14)
BUN SERPL-MCNC: 12 MG/DL (ref 7–30)
CALCIUM SERPL-MCNC: 8.9 MG/DL (ref 8.5–10.1)
CHLORIDE BLD-SCNC: 104 MMOL/L (ref 94–109)
CO2 SERPL-SCNC: 27 MMOL/L (ref 20–32)
CREAT SERPL-MCNC: 0.62 MG/DL (ref 0.52–1.04)
GFR SERPL CREATININE-BSD FRML MDRD: >90 ML/MIN/1.73M2
GLUCOSE BLD-MCNC: 137 MG/DL (ref 70–99)
POTASSIUM BLD-SCNC: 4.3 MMOL/L (ref 3.4–5.3)
SODIUM SERPL-SCNC: 138 MMOL/L (ref 133–144)

## 2022-11-01 RX ORDER — SEMAGLUTIDE 1.34 MG/ML
1 INJECTION, SOLUTION SUBCUTANEOUS
Qty: 6 ML | Refills: 0 | Status: SHIPPED | OUTPATIENT
Start: 2022-11-01 | End: 2023-01-30

## 2022-11-01 NOTE — NURSING NOTE
Patients first measurements in the medical weight loss program      Date: 04/13/2022  Neck: 17  Waist: 54  Hip: 63      Patients 2nd measurements in the medical weight loss program     Neck: 15.5  Waist:49.5  Hip:58

## 2022-11-02 ENCOUNTER — VIRTUAL VISIT (OUTPATIENT)
Dept: FAMILY MEDICINE | Facility: CLINIC | Age: 47
End: 2022-11-02
Payer: COMMERCIAL

## 2022-11-02 DIAGNOSIS — E66.01 MORBID OBESITY (H): ICD-10-CM

## 2022-11-02 DIAGNOSIS — E11.9 TYPE 2 DIABETES MELLITUS WITHOUT COMPLICATION, WITHOUT LONG-TERM CURRENT USE OF INSULIN (H): ICD-10-CM

## 2022-11-02 DIAGNOSIS — I10 PRIMARY HYPERTENSION: ICD-10-CM

## 2022-11-02 DIAGNOSIS — D72.829 LEUKOCYTOSIS, UNSPECIFIED TYPE: ICD-10-CM

## 2022-11-02 DIAGNOSIS — Z71.6 ENCOUNTER FOR SMOKING CESSATION COUNSELING: ICD-10-CM

## 2022-11-02 DIAGNOSIS — E78.5 DYSLIPIDEMIA: Primary | ICD-10-CM

## 2022-11-02 LAB
CHOLEST SERPL-MCNC: 126 MG/DL
CRP SERPL-MCNC: 6 MG/L (ref 0–8)
HDLC SERPL-MCNC: 45 MG/DL
LDLC SERPL CALC-MCNC: 45 MG/DL
NONHDLC SERPL-MCNC: 81 MG/DL
TRIGL SERPL-MCNC: 182 MG/DL

## 2022-11-02 PROCEDURE — 80061 LIPID PANEL: CPT | Performed by: INTERNAL MEDICINE

## 2022-11-02 PROCEDURE — 99214 OFFICE O/P EST MOD 30 MIN: CPT | Performed by: INTERNAL MEDICINE

## 2022-11-02 PROCEDURE — 86140 C-REACTIVE PROTEIN: CPT | Performed by: INTERNAL MEDICINE

## 2022-11-02 NOTE — PROGRESS NOTES
"Dariela is a 47 year old who is being evaluated via a billable telephone visit.      What phone number would you like to be contacted at? [phone]  How would you like to obtain your AVS? [facility]    Assessment & Plan   Problem List Items Addressed This Visit        Digestive    Morbid obesity (H)       Endocrine    Diabetes mellitus, type 2 (H)   Other Visit Diagnoses     Dyslipidemia    -  Primary    Relevant Orders    Lipid panel reflex to direct LDL Fasting    Leukocytosis, unspecified type        Relevant Orders    CRP, inflammation    Encounter for smoking cessation counseling        Primary hypertension             Sees MTM w weight management; got covered by insurance    No change of meds,     Discussed smoking cessation, including combination pharmacotherapy    Continue to monitor BP goal <130/80    Discussed medications reviewed medications tolerating metformin better.  No GI symptoms.  Blood pressure she reports is at goal unless she is drinking coffee or a rash.  Advised her goal is less than 130/80.  She continues to follow-up with [facility] for steatohepatitis.  Declined LFTs repeat today.  We will add a lipid panel to labs.  She remains on low-dose statins.  Continues lifestyle changes healthy diet she lost quite a bit of weight of 42 pounds congratulated patient on that she follows weight management as well.  She has chronic leukocytosis she is a chronic smoker she has cut down on smoking remarkably down to few cigarettes a day and she is willing to start some pharmacotherapy for smoking cessation including nicotine patches advised she can start on 14 mg dose she has cut down quite a bit on her cigarette smoking.  We can add Chantix and/or Wellbutrin which I do not recommend given her high blood pressure.  All questions answered follow-up in 3 months and as needed         BMI:   Estimated body mass index is 53.76 kg/m  as calculated from the following:    Height as of 10/28/22: 1.562 m (5' 1.5\").    " "Weight as of 10/28/22: 131.2 kg (289 lb 3.2 oz).       Work on weight loss  Regular exercise  See Patient Instructions    Return in about 3 months (around 2/2/2023), or if symptoms worsen or fail to improve, for As needed and if symptoms worsen, BP Recheck, Physical Exam.    Crystal Holbrook MD  [facility]        Discussed smoking cessation, dicussed pharmacotherapy; does not wake up s  moke,     Begin by sublingual lozenges and gum    /80's    Is doing sleep study, working on getting sleep study    Last HBA1C 6.0; within goal, stay same           Subjective   Dariela is a 47 year old, presenting for the following health issues:  RECHECK      History of Present Illness       Diabetes:   She presents for follow up of diabetes.  She is checking home blood glucose one time daily. She checks blood glucose before and after meals.  Blood glucose is never over 200 and never under 70. She is aware of hypoglycemia symptoms including shakiness, weakness and lethargy. She has no concerns regarding her diabetes at this time.  She is not experiencing numbness or burning in feet, excessive thirst, blurry vision, weight changes or redness, sores or blisters on feet. The patient has not had a diabetic eye exam in the last 12 months.         She eats 0-1 servings of fruits and vegetables daily.She consumes 0 sweetened beverage(s) daily.She exercises with enough effort to increase her heart rate 10 to 19 minutes per day.  She exercises with enough effort to increase her heart rate 4 days per week.   She is taking medications regularly.     No concerns  Lost 42 lbs,   Sees wt management  ozempic 1 mg x1 mo for now, want to see how it goes  Metformin, ER much better, than regular less GI sx's   BP has been good / consistently,  Sitting rested ~ 120/80's  Had chronic leukocytosis, e-consult to maricarmen buckley repeat, smokes   Something to help cessation; smokes at least few a day, \"not anything compared when sh eused " "to smoke when younger\"      Review of Systems   Constitutional, HEENT, cardiovascular, pulmonary, gi and gu systems are negative, except as otherwise noted.      Objective           Vitals:  No vitals were obtained today due to virtual visit.    Physical Exam   healthy, alert and no distress  PSYCH: Alert and oriented times 3; coherent speech, normal   rate and volume, able to articulate logical thoughts, able   to abstract reason, no tangential thoughts, no hallucinations   or delusions  Her affect is normal  RESP: No cough, no audible wheezing, able to talk in full sentences  Remainder of exam unable to be completed due to telephone visits    Lab on 10/31/2022   Component Date Value Ref Range Status     Hemoglobin A1C 10/31/2022 6.0 (H)  0.0 - 5.6 % Final    Normal <5.7%   Prediabetes 5.7-6.4%    Diabetes 6.5% or higher     Note: Adopted from ADA consensus guidelines.     Sodium 10/31/2022 138  133 - 144 mmol/L Final     Potassium 10/31/2022 4.3  3.4 - 5.3 mmol/L Final     Chloride 10/31/2022 104  94 - 109 mmol/L Final     Carbon Dioxide (CO2) 10/31/2022 27  20 - 32 mmol/L Final     Anion Gap 10/31/2022 7  3 - 14 mmol/L Final     Urea Nitrogen 10/31/2022 12  7 - 30 mg/dL Final     Creatinine 10/31/2022 0.62  0.52 - 1.04 mg/dL Final     Calcium 10/31/2022 8.9  8.5 - 10.1 mg/dL Final     Glucose 10/31/2022 137 (H)  70 - 99 mg/dL Final     GFR Estimate 10/31/2022 >90  >60 mL/min/1.73m2 Final    Effective December 21, 2021 eGFRcr in adults is calculated using the 2021 CKD-EPI creatinine equation which includes age and gender (Megan silverio al., NEJM, DOI: 10.1056/FQEXul2245248)               Phone call duration: 24 minutes    "

## 2022-11-21 ENCOUNTER — THERAPY VISIT (OUTPATIENT)
Dept: SLEEP MEDICINE | Facility: CLINIC | Age: 47
End: 2022-11-21
Payer: COMMERCIAL

## 2022-11-21 DIAGNOSIS — G47.33 OSA (OBSTRUCTIVE SLEEP APNEA): ICD-10-CM

## 2022-11-21 DIAGNOSIS — G47.9 SLEEP DISTURBANCE: ICD-10-CM

## 2022-11-21 PROCEDURE — 95810 POLYSOM 6/> YRS 4/> PARAM: CPT | Performed by: INTERNAL MEDICINE

## 2022-11-22 NOTE — PATIENT INSTRUCTIONS
[facility]    1. Your sleep study will be reviewed by a sleep physician within the next few days.     2. Please follow up in the sleep clinic as scheduled, or, make an appointment with your sleep provider to be seen within two weeks to discuss the results of the sleep study.    3. If you have any questions or problems with your treatment plan, please contact your sleep clinic provider at [phone] to further manage your condition.    4. Please review your attached medication list, and, at your follow-up appointment advise your sleep clinic provider about any changes.    5. Go to [email or web address] for more information about your sleep problems.    Tammi Hsieh, RPSGT  November 22, 2022         [FreeTextEntry1] : I have answered all of her questions regarding the operation and the perioperative instructions.

## 2022-12-01 ENCOUNTER — DOCUMENTATION ONLY (OUTPATIENT)
Dept: SLEEP MEDICINE | Facility: CLINIC | Age: 47
End: 2022-12-01
Payer: COMMERCIAL

## 2022-12-01 DIAGNOSIS — G47.33 OSA (OBSTRUCTIVE SLEEP APNEA): ICD-10-CM

## 2022-12-02 LAB — SLPCOMP: NORMAL

## 2022-12-02 NOTE — PROCEDURES
" SLEEP STUDY INTERPRETATION  DIAGNOSTIC POLYSOMNOGRAPHY REPORT      Patient: JULIAN GEE  YOB: 1975  Study Date: 11/21/2022  MRN: [id number]  Referring Provider: MINDY Alvarado Rennye Levina  Ordering Provider: MD Dru, Tim    Indications for Polysomnography: The patient is a 47 year old female who is 5' 2\" and weighs 295.0 lbs. Her BMI is 54.3, [place] sleepiness scale 2 and neck circumference is 42 cm. Relevant medical history includes obesity, T2D and HLD. A diagnostic polysomnogram was performed to evaluate for sleep apnea/hypoventilation/hypoxemia.    Polysomnogram Data: A full night polysomnogram recorded the standard physiologic parameters including EEG, EOG, EMG, ECG, nasal and oral airflow. Respiratory parameters of chest and abdominal movements were recorded with respiratory inductance plethysmography. Oxygen saturation was recorded by pulse oximetry. Hypopnea scoring rule used: 1B 4%.    Sleep Architecture: Summary assessment  The total recording time of the polysomnogram was 479.5 minutes. The total sleep time was 400.0 minutes. Sleep latency was increased at 35.1 minutes with the use of a sleep aid (CBD gummies). REM latency was 201.5 minutes. Arousal index was increased at 27.0 arousals per hour. Sleep efficiency was decreased at 83.4%. Wake after sleep onset was 44.0 minutes. The patient spent 12.0% of total sleep time in Stage N1, 53.3% in Stage N2, 17.3% in Stage N3, and 17.5% in REM. There was no supine sleep.    Respiration: Summary assessment    Events ? The polysomnogram revealed a presence of 0 obstructive, 0 central, and 0 mixed apneas resulting in an apnea index of 0 events per hour. There were 34 obstructive hypopneas and 0 central hypopneas resulting in an obstructive hypopnea index of 5.1 and central hypopnea index of 0 events per hour. The combined apnea/hypopnea index was 5.1 events per hour (central apnea/hypopnea index was 0 events per hour). The REM AHI was 21.4 " events per hour. There was no supine sleep. The RERA index was 1.5 events per hour.  The RDI was 6.6 events per hour.    Snoring - was reported as intermittent.    Respiratory rate and pattern - was notable for normal respiratory rate and pattern.    Sustained Sleep Associated Hypoventilation - Transcutaneous carbon dioxide monitoring was used, however significant hypoventilation was not present with a maximum change from 33.0 to 41.5 mmHg and 0 minutes at or greater than 55 mmHg.    Sleep Associated Hypoxemia - (Greater than 5 minutes O2 sat at or below 88%) was present. Baseline oxygen saturation was 92.3%. Lowest oxygen saturation was 81.0%. Time spent less than or equal to 88% was 7.5 minutes. Time spent less than or equal to 89% was 15.7 minutes.    Movement Activity: Summary assessment    Periodic Limb Activity - There were 16 PLMs during the entire study. The PLM index was 2.4 movements per hour. The PLM Arousal Index was 1.2 per hour.    REM EMG Activity - Excessive transient was not present.    Nocturnal Behavior - Abnormal sleep related behaviors were not noted.    Bruxism - None apparent.    Cardiac Summary: Summary assessment  The average pulse rate was 78.8 bpm. The minimum pulse rate was 62.0 bpm while the maximum pulse rate was 110.0 bpm.  Arrhythmias were not noted.  Assessment:     This study revealed mild obstructive sleep apnea which became moderate and was almost exclusively present in REM sleep.    There was no supine sleep which may have under-estimated the severity of sleep disordered breathing.    There was very mild sleep hypoxemia. There was no sleep hypoventilation      Recommendations:    Based on the presence of mild/moderate obstructive sleep apnea and excessive daytime sleepiness, treatment could be empirically initiated with Auto?titrating PAP therapy with a range of 5 to 15 cmH2O. Recommend clinical follow up with sleep management team.    Advice regarding the risks of drowsy  driving.    Suggest optimizing sleep schedule and avoiding sleep deprivation.    Weight management (if BMI > 30).    Pharmacologic therapy should be used for management of restless legs syndrome only if present and clinically indicated and not based on the presence of periodic limb movements alone.    Diagnostic Codes:   Obstructive Sleep Apnea G47.33  Sleep Hypoxemia/Hypoventilation G47.36     11/21/2022 [facility] [facility] Sleep Study (295.0 lbs) - AHI 5.1, RDI 6.6, REM AHI 21.4, Low O2 81.0%, Time Spent ?88% 7.5 minutes / Time Spent ?89% 15.7 minutes.     _____________________________________   Electronically Signed By: Tim Christensen MD (12/1/2022)           Range(%) Time in range (min)   0.0 - 89.0 15.7   0.0 - 88.0 7.5         Stage Min(mm Hg) Max(mm Hg)   Wake 33.7 41.5   NREM(1+2+3) 33.0 41.5   REM 33.7 37.0       Range(mmHg) Time in range (min)   55.0 - 100.0 -   Excluded data <20.0 & >65.0 25.0

## 2022-12-05 ASSESSMENT — SLEEP AND FATIGUE QUESTIONNAIRES
HOW LIKELY ARE YOU TO NOD OFF OR FALL ASLEEP WHILE SITTING QUIETLY AFTER LUNCH WITHOUT ALCOHOL: WOULD NEVER DOZE
HOW LIKELY ARE YOU TO NOD OFF OR FALL ASLEEP IN A CAR, WHILE STOPPED FOR A FEW MINUTES IN TRAFFIC: WOULD NEVER DOZE
HOW LIKELY ARE YOU TO NOD OFF OR FALL ASLEEP WHILE SITTING AND READING: WOULD NEVER DOZE
HOW LIKELY ARE YOU TO NOD OFF OR FALL ASLEEP WHILE LYING DOWN TO REST IN THE AFTERNOON WHEN CIRCUMSTANCES PERMIT: SLIGHT CHANCE OF DOZING
HOW LIKELY ARE YOU TO NOD OFF OR FALL ASLEEP WHILE SITTING INACTIVE IN A PUBLIC PLACE: WOULD NEVER DOZE
HOW LIKELY ARE YOU TO NOD OFF OR FALL ASLEEP WHILE SITTING AND TALKING TO SOMEONE: WOULD NEVER DOZE
HOW LIKELY ARE YOU TO NOD OFF OR FALL ASLEEP WHEN YOU ARE A PASSENGER IN A CAR FOR AN HOUR WITHOUT A BREAK: WOULD NEVER DOZE
HOW LIKELY ARE YOU TO NOD OFF OR FALL ASLEEP WHILE WATCHING TV: WOULD NEVER DOZE

## 2022-12-06 ENCOUNTER — OFFICE VISIT (OUTPATIENT)
Dept: SLEEP MEDICINE | Facility: CLINIC | Age: 47
End: 2022-12-06
Payer: COMMERCIAL

## 2022-12-06 VITALS
BODY MASS INDEX: 53.18 KG/M2 | SYSTOLIC BLOOD PRESSURE: 125 MMHG | DIASTOLIC BLOOD PRESSURE: 87 MMHG | OXYGEN SATURATION: 98 % | HEIGHT: 62 IN | WEIGHT: 289 LBS | HEART RATE: 94 BPM

## 2022-12-06 DIAGNOSIS — E11.9 TYPE 2 DIABETES MELLITUS WITHOUT COMPLICATION, WITHOUT LONG-TERM CURRENT USE OF INSULIN (H): ICD-10-CM

## 2022-12-06 DIAGNOSIS — G47.33 OSA (OBSTRUCTIVE SLEEP APNEA): Primary | ICD-10-CM

## 2022-12-06 PROCEDURE — 99215 OFFICE O/P EST HI 40 MIN: CPT | Performed by: INTERNAL MEDICINE

## 2022-12-06 RX ORDER — METFORMIN HCL 500 MG
TABLET, EXTENDED RELEASE 24 HR ORAL
Qty: 180 TABLET | Refills: 0 | Status: SHIPPED | OUTPATIENT
Start: 2022-12-06 | End: 2023-04-20

## 2022-12-06 NOTE — TELEPHONE ENCOUNTER
Reviewed protocol parameters, and pt did have a recent visit last month.  Prescription approved per [facility] Refill Protocol.  Ying Xiong RN  [facility] RN Triage Team

## 2022-12-06 NOTE — PROGRESS NOTES
Sleep Study Follow Up Visit  Dariela Killian was seen in our clinic for a follow up for:   She was last seen in my clinic on 8/30/2022.   She then had a sleep study on 11/21/2022, for evaluation for sleep apnea/hypoventilation/hypoxemia  The study revealed the presence of:  Sleep Architecture: Summary assessment  The total recording time of the polysomnogram was 479.5 minutes. The total sleep time was 400.0 minutes. Sleep latency was increased at 35.1 minutes with the use of a sleep aid (CBD gummies). REM latency was 201.5 minutes. Arousal index was increased at 27.0 arousals per hour. Sleep efficiency was decreased at 83.4%. Wake after sleep onset was 44.0 minutes. The patient spent 12.0% of total sleep time in Stage N1, 53.3% in Stage N2, 17.3% in Stage N3, and 17.5% in REM. There was no supine sleep.    Respiration: Summary assessment    Events ? The polysomnogram revealed a presence of 0 obstructive, 0 central, and 0 mixed apneas resulting in an apnea index of 0 events per hour. There were 34 obstructive hypopneas and 0 central hypopneas resulting in an obstructive hypopnea index of 5.1 and central hypopnea index of 0 events per hour. The combined apnea/hypopnea index was 5.1 events per hour (central apnea/hypopnea index was 0 events per hour). The REM AHI was 21.4 events per hour. There was no supine sleep. The RERA index was 1.5 events per hour.  The RDI was 6.6 events per hour.    Snoring - was reported as intermittent.    Respiratory rate and pattern - was notable for normal respiratory rate and pattern.    Sustained Sleep Associated Hypoventilation - Transcutaneous carbon dioxide monitoring was used, however significant hypoventilation was not present with a maximum change from 33.0 to 41.5 mmHg and 0 minutes at or greater than 55 mmHg.    Sleep Associated Hypoxemia - (Greater than 5 minutes O2 sat at or below 88%) was present. Baseline oxygen saturation was  "92.3%. Lowest oxygen saturation was 81.0%. Time spent less than or equal to 88% was 7.5 minutes. Time spent less than or equal to 89% was 15.7 minutes.     Movement Activity: Summary assessment    Periodic Limb Activity - There were 16 PLMs during the entire study. The PLM index was 2.4 movements per hour. The PLM Arousal Index was 1.2 per hour.    REM EMG Activity - Excessive transient was not present.    Nocturnal Behavior - Abnormal sleep related behaviors were not noted.    Bruxism - None apparent.     Cardiac Summary: Summary assessment  The average pulse rate was 78.8 bpm. The minimum pulse rate was 62.0 bpm while the maximum pulse rate was 110.0 bpm.  Arrhythmias were not noted.  REVIEW OF SYSTEMS: Today, detailed review of systems was not done, except as described above.   Since her last visit there has been no significant change in her overall health.   Past Medical History:   Diagnosis Date     Abnormal Pap smear of cervix     2010,2011,  6/12, 11/18/20     Allergic rhinitis, cause unspecified     Allergic rhinitis - OTC using claritin     High risk HPV infection     2013, 5/2015, 01/12/17, 05/24/18, 2019, 2020     Hx of colposcopy with cervical biopsy 07/03/2015, 03/13/17    WNL, 03/13/17: [place] [facility].      Obesity, unspecified     diet and exercising     Patient Active Problem List   Diagnosis     Allergic rhinitis     Obesity     CARDIOVASCULAR SCREENING; LDL GOAL LESS THAN 160     ASCUS with positive high risk HPV cervical     Diabetes mellitus, type 2 (H)     Morbid obesity (H)     High cholesterol     Heartburn     Past Surgical History:   Procedure Laterality Date     TONSILLECTOMY  1988       SOCIAL HX (Reviwed from my previous notes)    PHYSICAL EXAMINATION:   /87   Pulse 94   Ht 1.562 m (5' 1.5\")   Wt 131.1 kg (289 lb)   SpO2 98%   BMI 53.72 kg/m     Exam:  Constitutional: healthy, alert and no distress  Head: Normocephalic. No masses, lesions, tenderness or abnormalities  ENT: ENT exam " normal, no neck nodes or sinus tenderness  Cardiovascular: negative, PMI normal. No lifts, heaves, or thrills. RRR. No murmurs, clicks gallops or rub  Respiratory: negative, Percussion normal. Good diaphragmatic excursion. Lungs clear  Gastrointestinal: Abdomen soft, non-tender. BS normal. No masses, organomegaly  : Deferred  Musculoskeletal: extremities normal- no gross deformities noted, gait normal and normal muscle tone  Skin: no suspicious lesions or rashes  Neurologic: Gait normal. Reflexes normal and symmetric. Sensation grossly WNL.  Psychiatric: mentation appears normal and affect normal/bright    ICD-10-CM    1. MERARI (obstructive sleep apnea)  G47.33         Assessment:     This study revealed mild obstructive sleep apnea which became moderate and was almost exclusively present in REM sleep.    There was no supine sleep which may have under-estimated the severity of sleep disordered breathing.    There was very mild sleep hypoxemia. There was no sleep hypoventilation     Recommendations:    Based on the presence of moderate REM-related MERARI obstructive sleep apnea and insomnia, treatment could be empirically initiated with Auto?titrating PAP therapy with a range of 5 to 15 cmH2O.     If there is no improvement in insomnia with CPAP use, I will refer her to sleep psychology for CBT-I. I suspect that regardless of the presence of REM related MERARI, there is a component of psychophysiological insomnia which may affect compliance with CPAP.    Advice regarding the risks of drowsy driving.    Suggest optimizing sleep schedule and avoiding sleep deprivation.    Weight management (if BMI > 30).    Pharmacologic therapy should be used for management of restless legs syndrome only if present and clinically indicated and not based on the presence of periodic limb movements alone.    Your BMI is Body mass index is 53.72 kg/m .    Body mass index (BMI) is one way to tell whether you are at a healthy weight, overweight,  or obese. It measures your weight in relation to your height.  A BMI of 18.5 to 24.9 is in the healthy range. A person with a BMI of 25 to 29.9 is considered overweight, and someone with a BMI of 30 or greater is considered obese.  Another way to find out if you are at risk for health problems caused by overweight and obesity is to measure your waist. If you are a woman and your waist is more than 35 inches, or if you are a man and your waist is more than 40 inches, your risk of disease may be higher.  More than two-thirds of American adults are considered overweight or obese. Being overweight or obese increases the risk for further weight gain.  Excess weight may lead to heart disease and diabetes. Creating and following plans for healthy eating and physical activity may help you improve your health.    Methods for maintaining or losing weight.    Weight control is part of healthy lifestyle and includes exercise, emotional health, and healthy eating habits.  Careful eating habits lifelong is the mainstay of weight control.  Though there are significant health benefits from weight loss, long-term weight loss with diet alone may be very difficult to achieve- studies show long-term success with dietary management in less than 10% of people. Attaining a healthy weight may be especially difficult to achieve in those with severe obesity. In some cases, medications, devices and surgical management might be considered.    What can you do?    If you are overweight or obese and are interested in methods for weight loss, you should discuss this with your provider. In addition, we recommend that you review healthy life styles and methods for weight loss available through the National Institutes of Health patient information sites:     http://win.niddk.nih.gov/publications/index.htm          PLAN: After detailed discussion with Dariela Killian, the following plan was agreed upon:   I discussed all of the above with Dariela MYERS  Constantin, who was agreeable with the plan.  Tim Gonsales MD, MD  I spent a total of 40 minutes face to face with Dariela Killian during today's office visit. Over 50% of this time was spent counseling the patient and/or coordinating care regarding their pulmonary disease.

## 2022-12-06 NOTE — NURSING NOTE
"Chief Complaint   Patient presents with     Study Results     PSG f/u       Initial /87   Pulse 94   Ht 1.562 m (5' 1.5\")   Wt 131.1 kg (289 lb)   SpO2 98%   BMI 53.72 kg/m   Estimated body mass index is 53.72 kg/m  as calculated from the following:    Height as of this encounter: 1.562 m (5' 1.5\").    Weight as of this encounter: 131.1 kg (289 lb).    Medication Reconciliation: complete  ESS 1  Nichole Ochoa MA  "

## 2022-12-14 ENCOUNTER — TELEPHONE (OUTPATIENT)
Dept: SLEEP MEDICINE | Facility: CLINIC | Age: 47
End: 2022-12-14

## 2023-01-02 ENCOUNTER — TELEPHONE (OUTPATIENT)
Dept: PHARMACY | Facility: CLINIC | Age: 48
End: 2023-01-02

## 2023-01-02 NOTE — TELEPHONE ENCOUNTER
LVMx1, sent myc    Appt on 2/7/23 with Lauren Bloch needs to be rescheduled due to the provider being unavailable. Reschedule with Yu, next available appt.

## 2023-01-09 ENCOUNTER — TRANSFERRED RECORDS (OUTPATIENT)
Dept: HEALTH INFORMATION MANAGEMENT | Facility: CLINIC | Age: 48
End: 2023-01-09
Payer: COMMERCIAL

## 2023-01-29 DIAGNOSIS — E66.01 MORBID OBESITY (H): ICD-10-CM

## 2023-01-29 DIAGNOSIS — E11.9 TYPE 2 DIABETES MELLITUS WITHOUT COMPLICATION, WITHOUT LONG-TERM CURRENT USE OF INSULIN (H): ICD-10-CM

## 2023-01-30 RX ORDER — SEMAGLUTIDE 1.34 MG/ML
1 INJECTION, SOLUTION SUBCUTANEOUS
Qty: 3 ML | Refills: 2 | Status: SHIPPED | OUTPATIENT
Start: 2023-01-30 | End: 2023-02-16 | Stop reason: DRUGHIGH

## 2023-01-30 NOTE — TELEPHONE ENCOUNTER
Spoke with pt.  Last seen 10/28/22.  Has appt with Yu 2/16/23 and provider 4/20/23.  Will take last injection this week.    States med is working.  Has lost 15# since Oct. 28th.  Will route to provider.  Janelle Jane, MS, RD, RN

## 2023-02-16 ENCOUNTER — VIRTUAL VISIT (OUTPATIENT)
Dept: PHARMACY | Facility: CLINIC | Age: 48
End: 2023-02-16
Attending: PHYSICIAN ASSISTANT
Payer: COMMERCIAL

## 2023-02-16 DIAGNOSIS — E11.9 TYPE 2 DIABETES MELLITUS WITHOUT COMPLICATION, WITHOUT LONG-TERM CURRENT USE OF INSULIN (H): Primary | ICD-10-CM

## 2023-02-16 DIAGNOSIS — E66.01 MORBID OBESITY (H): ICD-10-CM

## 2023-02-16 PROCEDURE — 99207 PR NO CHARGE LOS: CPT | Performed by: PHARMACIST

## 2023-02-16 RX ORDER — SEMAGLUTIDE 2.68 MG/ML
2 INJECTION, SOLUTION SUBCUTANEOUS
Qty: 9 ML | Refills: 3 | Status: SHIPPED | OUTPATIENT
Start: 2023-02-16 | End: 2023-04-20

## 2023-02-16 NOTE — PATIENT INSTRUCTIONS
"Recommendations from today's disease management visit:                                                      1. Increase Ozempic to 1.5 mg weekly, then can consider further increase if necessary to 2 mg weekly at follow up with Dr. Zaidi in April. RX for Ozempic pen sent in.   -There are 72 clicks to get to 2 mg in the Ozempic 2 mg pen. To get 1.5 mg on the 2 mg pen, you will count to 54 clicks. There will not be any denotation/marking that you have gotten to 1.5 mg on the pen, so it is important to ensure you count the clicks correctly. The first time you are counting clicks, it is recommended to count the 72 clicks to get to 2 mg to ensure counting correctly (the pen will say 2 mg once you get to that dose), then turn the knob back to \"0\" and count to 54 clicks to get the 1.5 mg dose.     2. Creat a SMART goal around exercise.   What are SMART goals?     The SMART method helps you effectively approach reaching your goals - it stands for specific, measurable, attainable, relevant and time-bound. Being accountable to what you set will assist to maintain real long-term consistency.     Specific: Increase the chances that you are able to accomplish the goals by making sure they re well-defined.   Measurable: Develop criteria for measuring progress.  Achievable: Create goals that are attainable and achievable by ensuring that you have the skills and resources to reach the goal.   Relevant: Align goals with the overall objectives.   Time-bound: Give yourself a deadline for reaching your goal     Example: I will exercise for 30 minutes on the treadmill 2 times each week, then will re-evaluate my goal in 1 month.     3. Get back to meal prepping to help with ease of healthier food choices on a weekly basis.     4. Take 10-15 minutes of your day each day to focus on \"you\" - de-stress by journaling, listening to music, reading, etc.     Follow-up: Dr. Zaidi in April at already scheduled appointment    To schedule another " MTM appointment, please call the clinic directly or you may call the MTM scheduling line at [phone] or toll-free at [phone].     My Clinical Pharmacist's contact information:                                                      Please feel free to contact me with any questions or concerns you have.      Lauren Bloch, PharmD, BCACP   Medication Therapy Management Pharmacist   [facility] [place]

## 2023-02-16 NOTE — PROGRESS NOTES
Disease State Management Encounter:                          Dariela Killian is a 47 year old female called for a follow-up visit.  Today's visit is a follow-up visit from Robert Alvarado PA-C. Insurance does not cover comprehensive medication review with Medication Therapy Management (MTM). Patient declines private pay. Therefore, completed one time consult today.   Referred by provider as provider is on [facility].     Reason for visit: Ozempic follow up.     Obesity/Type 2 Diabetes:   Ozempic 1 mg weekly  Metformin  mg twice daily     Followed by Robert Alvarado PA-C, seen 10/28/2022 for Return Medical Weight Management. Patient is experiencing the follow side effects: None. Reports that she doesn't sleep well, just got a new CPAP. Working with CPAP and getting used to it but finds that sometimes at night taking off because she is waking up and panicking with it on. Reports that stress has been higher because she in the last months have gotten a new job, is stressful. She feels some of her lack of progress is attributed to feeling she is at a stand still. Reports she was focusing on nutrition and activity more historically and wants to get back to that. Feels that an increased dose of Ozempic could be helpful. Feels she is at a weight plateau the past couple months and wants to continue per progress.   Diet/Eating Habits: Patient reports she wants to stop easy fixes for food. She previously was meal prepping and found this helpful for several of her meals. She would like to get back to this.    Exercise/Activity: Patient reports she has a treadmill access, enjoys walking. She feels that she could fit working out into her regimen on a weekly bassis. She reports she wants to be more active.   Blood sugar monitorin-2 times a week. Ranges (patient reported): Fasting- 100-130  Symptoms of low blood sugar? none  Symptoms of high blood sugar? none  Eye exam: up to date  Foot exam: up to date  Aspirin: No  Statin: Yes:  "atorvastatin   ACEi/ARB: Yes: losartan 50 mg daily.   Urine Albumin:   Lab Results   Component Value Date    UMALCR 8.89 06/10/2022      Lab Results   Component Value Date    A1C 6.0 10/31/2022    A1C 6.1 06/10/2022    A1C 7.3 02/14/2022       Initial Consult Weight: 315.8 lb    Wt Readings from Last 4 Encounters:   12/06/22 289 lb (131.1 kg)   10/28/22 289 lb 3.2 oz (131.2 kg)   08/30/22 295 lb 3.2 oz (133.9 kg)   08/18/22 293 lb (132.9 kg)     Estimated body mass index is 53.72 kg/m  as calculated from the following:    Height as of 12/6/22: 5' 1.5\" (1.562 m).    Weight as of 12/6/22: 289 lb (131.1 kg).     Lab Results   Component Value Date    A1C 6.0 10/31/2022    A1C 6.1 06/10/2022    A1C 7.3 02/14/2022     Assessment/Plan:  Obesity: Needs improvement.. Patient would benefit from the following medication change(s): increase Ozempic to 1.5 mg weekly. Discussed behavioral/nutrition modifications to consider: creation and implementation of SMART goals surrounding exercise/activity (goals below). Discussed nutrition changes that she wishes to focus on. Going to increase Ozempic slowly to maximize tolerance and efficacy. Going to maximize Ozempic more so for assistance with furthering weight loss.     1. Increase Ozempic to 1.5 mg weekly, then can consider further increase if necessary to 2 mg weekly at follow up with Dr. Zaidi in April. RX for Ozempic pen sent in.   2. Creat a SMART goal around exercise.   What are SMART goals?     The SMART method helps you effectively approach reaching your goals - it stands for specific, measurable, attainable, relevant and time-bound. Being accountable to what you set will assist to maintain real long-term consistency.     Specific: Increase the chances that you are able to accomplish the goals by making sure they re well-defined.   Measurable: Develop criteria for measuring progress.  Achievable: Create goals that are attainable and achievable by ensuring that you have the " "skills and resources to reach the goal.   Relevant: Align goals with the overall objectives.   Time-bound: Give yourself a deadline for reaching your goal     Example: I will exercise for 30 minutes on the treadmill 2 times each week, then will re-evaluate my goal in 1 month.     3. Get back to meal prepping to help with ease of healthier food choices on a weekly basis.     4. Take 10-15 minutes of your day each day to focus on \"you\" - de-stress by journaling, listening to music, reading, etc.     Follow-up: Dr. Zaidi in April at already scheduled appointment    I spent 30 minutes with this patient today. All changes were made via collaborative practice agreement with Robert Alvarado PA-C. A copy of the visit note was provided to the patient's provider(s).    A summary of these recommendations was sent via "[employer].    Lauren Bloch, PharmD, BCACP   Medication Therapy Management Pharmacist   [facility]       Medication Therapy Recommendations  Obesity    Current Medication: OZEMPIC, 1 MG/DOSE, 4 MG/3ML pen (Discontinued)   Rationale: Dose too low - Dosage too low - Effectiveness   Recommendation: Increase Dose - Semaglutide (2 MG/DOSE) 8 MG/3ML pen   Status: Accepted per CPA                  "

## 2023-02-21 ENCOUNTER — TELEPHONE (OUTPATIENT)
Dept: FAMILY MEDICINE | Facility: CLINIC | Age: 48
End: 2023-02-21
Payer: COMMERCIAL

## 2023-02-21 NOTE — TELEPHONE ENCOUNTER
Patient Quality Outreach    Patient is due for the following:   Diabetes -  Eye Exam    Next Steps:   Schedule a office visit for Eye Exam    Type of outreach:    Sent [employer] message.      Questions for provider review:    None     Cornelius Springer MA

## 2023-03-02 ENCOUNTER — DOCUMENTATION ONLY (OUTPATIENT)
Dept: HOME HEALTH SERVICES | Facility: CLINIC | Age: 48
End: 2023-03-02
Payer: COMMERCIAL

## 2023-03-02 NOTE — PROGRESS NOTES
Date: 3.2.23  Location Of Mask Fitting: Teleconsult  Reason For Mask Fitting: Oral Breathing W Cold/congestion  Discussed/viewed The Following Masks: Rm Airfit/airtouch F20 Full Face; Fp Vitera Full Face; Rm Airfit F30i Full Face    Mask And Size Selected: Rm Airfit F20 Full Face Size Medium  Mask Clarification Needed: Yes

## 2023-03-07 ENCOUNTER — TELEPHONE (OUTPATIENT)
Dept: FAMILY MEDICINE | Facility: CLINIC | Age: 48
End: 2023-03-07

## 2023-03-07 NOTE — TELEPHONE ENCOUNTER
Attempted to call patient after no response to [email or web address] message left on 2/24/2023.  Showcase has not been viewed by patient as of 3/7/2023.

## 2023-03-23 ENCOUNTER — TELEPHONE (OUTPATIENT)
Dept: FAMILY MEDICINE | Facility: CLINIC | Age: 48
End: 2023-03-23

## 2023-03-23 NOTE — TELEPHONE ENCOUNTER
Patient Quality Outreach    Patient is due for the following:   Diabetes -  Eye Exam    Next Steps:   Schedule a office visit for Eye Exam    Type of outreach:    Sent [employer] message.      Questions for provider review:    None     Cornelius Springer MA

## 2023-03-25 ENCOUNTER — HEALTH MAINTENANCE LETTER (OUTPATIENT)
Age: 48
End: 2023-03-25

## 2023-04-11 ENCOUNTER — MYC MEDICAL ADVICE (OUTPATIENT)
Dept: OBGYN | Facility: CLINIC | Age: 48
End: 2023-04-11
Payer: COMMERCIAL

## 2023-04-11 DIAGNOSIS — N93.9 ABNORMAL UTERINE BLEEDING (AUB): Primary | ICD-10-CM

## 2023-04-11 RX ORDER — TRANEXAMIC ACID 650 MG/1
1300 TABLET ORAL 3 TIMES DAILY
Status: CANCELLED | OUTPATIENT
Start: 2023-04-11

## 2023-04-11 NOTE — CONFIDENTIAL NOTE
Last OV 8/18/22  tranexamic acid (LYSTEDA) 650 MG tablet 30 tablet 3 8/18/2022 8/23/2022 --   Sig - Route: Take 2 tablets (1,300 mg) by mouth 3 times daily for 5 days - Oral       mycharted patient to see if she is experiencing heavy bleeding.    Sangeeta Sol, RN

## 2023-04-12 RX ORDER — TRANEXAMIC ACID 650 MG/1
1300 TABLET ORAL 3 TIMES DAILY
Qty: 30 TABLET | Refills: 5 | Status: SHIPPED | OUTPATIENT
Start: 2023-04-12

## 2023-04-12 NOTE — CONFIDENTIAL NOTE
Last OV 8/18/22  It does not appear this was originally prescribed by us.  Patient requesting refill.    Will route to provider to review.    Sangeeta Sol RN

## 2023-04-13 NOTE — PROGRESS NOTES
"Dariela is a 47 year old who is being evaluated via a billable video visit.      The patient has been notified of following:     \"This video visit will be conducted via a call between you and your physician/provider. We have found that certain health care needs can be provided without the need for an in-person physical exam.  This service lets us provide the care you need with a video conversation.  If a prescription is necessary we can send it directly to your pharmacy.  If lab work is needed we can place an order for that and you can then stop by our lab to have the test done at a later time.    Video visits are billed at different rates depending on your insurance coverage.  Please reach out to your insurance provider with any questions.    If during the course of the call the physician/provider feels a video visit is not appropriate, you will not be charged for this service.\"    Patient has given verbal consent for Video visit? Yes    How would you like to obtain your AVS? MyChart    If the video visit is dropped, the invitation should be resent by: Text to cell phone: [phone]    Will anyone else be joining your video visit? No    I    Video-Visit Details    Type of service:  Video Visit    Video Start Time: 8:51 AM    Video End Time:9:09 AM    Originating Location (pt. Location): Home    Distant Location (provider location):  [facility]     Platform used for Video Visit: [facility]            Bariatric Care Clinic Non Surgical Follow up Visit   Date of visit: 4/20/2023  Physician: RELL Zaidi MD, MD  Primary Care is Crystal Holbrook.  Dariela Killian   47 year old  female       Today's Weight:   Wt Readings from Last 1 Encounters:   04/20/23 286 lb (129.7 kg)     Body mass index is 53.6 kg/m .           Assessment and Plan   Assessment: Dariela is a 47 year old year old female who presents for medical weight management.      Plan:    1. Morbid obesity " (H)  Patient was congratulated on her success thus far. Healthy habits to assist with further weight loss were discussed. She set goals to get back on trak with meal prepping and will get outside and walk 3 days per week. She will continue the ozempic and metformin. We discussed the patient's co-morbid conditions including DM. These likely will improve with healthy habits and weight loss.    2. Type 2 diabetes mellitus without complication, without long-term current use of insulin (H)  This may improve with healthy habits and weight loss.    Follow up in 3 months with Tara Alvarado           INTERIM HISTORY  Patient has been followed by Tara Alvarado for MWM and more recently saw Lauren Bloch. Her Ozempic dose was increased to 1.5 mg weekly at her last visit. She feels that it is helping to control her appetite.  She finds if she eats too much she feels sick. She sometimes has diarrhea or constipation. She started a new job. Her stress level is high and she is stress eating.    DIETARY HISTORY  She is off track. She is working too much, Struggling with stress eating. She is making unhealthy choices for meals.  Fluid Intake: not this week  Portion Control: yes    Struggling With: off track with all heabits    Knowledgeable in Reading Food Labels: yes  Getting Adequate Protein: no  Sleeping 7-8 hours/day struggling with insomnia  Stress management stress eating, talking to her friends    PHYSICAL ACTIVITY PATTERNS:  None, hoping to start doing some walking    REVIEW OF SYSTEMS  GENERAL/CONSTITUTIONAL:  Fatigue: yes  HEENT:   glaucoma: no  CARDIOVASCULAR:  History of heart disease: no  PSYCHIATRIC:  Moods: stressed, not depressed  ENDOCRINE:  Monitoring Blood Sugars: yes  Sugars Well Controlled:   No personal or family history of medullary thyroid cancer no         Patient Profile   Social History     Social History Narrative     Not on file        Past Medical History   Past Medical History:   Diagnosis Date      "Abnormal Pap smear of cervix     2010,2011,  6/12, 11/18/20     Allergic rhinitis, cause unspecified     Allergic rhinitis - OTC using claritin     High risk HPV infection     2013, 5/2015, 01/12/17, 05/24/18, 2019, 2020     Hx of colposcopy with cervical biopsy 07/03/2015, 03/13/17    WNL, 03/13/17: [facility].      Obesity, unspecified     diet and exercising     Patient Active Problem List   Diagnosis     Allergic rhinitis     Obesity     CARDIOVASCULAR SCREENING; LDL GOAL LESS THAN 160     ASCUS with positive high risk HPV cervical     Diabetes mellitus, type 2 (H)     Morbid obesity (H)     High cholesterol     Heartburn       Past Surgical History  She has a past surgical history that includes tonsillectomy (1988).     Examination   Ht 5' 1.25\" (1.556 m)   Wt 286 lb (129.7 kg)   BMI 53.60 kg/m    Wt Readings from Last 4 Encounters:   04/20/23 286 lb (129.7 kg)   12/06/22 289 lb (131.1 kg)   10/28/22 289 lb 3.2 oz (131.2 kg)   08/30/22 295 lb 3.2 oz (133.9 kg)     GENERAL: Healthy, alert and no distress  EYES: Eyes grossly normal to inspection.  No discharge or erythema, or obvious scleral/conjunctival abnormalities.  RESP: No audible wheeze, cough, or visible cyanosis.  No visible retractions or increased work of breathing.    SKIN: Visible skin clear. No significant rash, abnormal pigmentation or lesions.  NEURO: Cranial nerves grossly intact.  Mentation and speech appropriate for age.  PSYCH: Mentation appears normal, affect normal/bright, judgement and insight intact, normal speech and appearance well-groomed.     Counseling:   We reviewed the important post op bariatric recommendations:  -eating 3 meals daily  -eating protein first, getting >60gm protein daily  -eating slowly, chewing food well  -avoiding/limiting calorie containing beverages  -limiting starchy vegetables and carbohydrates, choosing wheat, not white with breads,   crackers, pastas, rolly, bagels, tortillas, rice  -limiting restaurant or " cafeteria eating to twice a week or less    We discussed the importance of restorative sleep and stress management in maintaining a healthy weight.  We discussed the National Weight Control Registry healthy weight maintenance strategies and ways to optimize metabolism.  We discussed the importance of physical activity including cardiovascular and strength training in maintaining a healthier weight.    Total time spent on the date of this encounter doing: chart review, review of test results, patient visit, physical exam, education, counseling, developing plan of care and documenting = 30 minutes.         RELL Zaidi MD  [facility]

## 2023-04-20 ENCOUNTER — VIRTUAL VISIT (OUTPATIENT)
Dept: SURGERY | Facility: CLINIC | Age: 48
End: 2023-04-20
Payer: COMMERCIAL

## 2023-04-20 VITALS — HEIGHT: 61 IN | BODY MASS INDEX: 54 KG/M2 | WEIGHT: 286 LBS

## 2023-04-20 DIAGNOSIS — E11.9 TYPE 2 DIABETES MELLITUS WITHOUT COMPLICATION, WITHOUT LONG-TERM CURRENT USE OF INSULIN (H): ICD-10-CM

## 2023-04-20 DIAGNOSIS — E66.01 MORBID OBESITY (H): Primary | ICD-10-CM

## 2023-04-20 PROCEDURE — 99214 OFFICE O/P EST MOD 30 MIN: CPT | Mod: VID | Performed by: FAMILY MEDICINE

## 2023-04-20 RX ORDER — METFORMIN HCL 500 MG
500 TABLET, EXTENDED RELEASE 24 HR ORAL 2 TIMES DAILY WITH MEALS
Qty: 180 TABLET | Refills: 0 | Status: SHIPPED | OUTPATIENT
Start: 2023-04-20 | End: 2023-07-13

## 2023-04-20 RX ORDER — SEMAGLUTIDE 2.68 MG/ML
2 INJECTION, SOLUTION SUBCUTANEOUS
Qty: 9 ML | Refills: 3 | Status: SHIPPED | OUTPATIENT
Start: 2023-04-20 | End: 2023-09-19

## 2023-04-20 NOTE — PATIENT INSTRUCTIONS
Dante Lyle,  It was nice meeting with you today. Please call [phone] to set up a follow up appointment with Tara Alvarado in 3 months.  Kathryn Zaidi       Eat Better ? Move More ? Live Well    Eat 3 nutrient-rich meals each day    Don t skip meals--it will cause you to overeat later in the day!    Eating fiber (vegetables/fruits/whole grains) and protein with meals helps you stay full longer    Choose foods with less than 10 grams of sugar and 5 grams of fat per serving to prevent excess calories and weight re-gain  Eat around the same times each day to develop a routine eating schedule   Avoid snacking unless physically hungry.   Planned snacks: 1-2 times per day and no more than 150 calories    Eat protein first   Protein helps with healing, maintaining adequate muscle mass, reducing hunger and optimizing nutritional status   Aim for 60-80 grams of protein per day   Fill up on Fiber   Fiber comes from plants--fruits, veggies, whole grains, nuts/seeds and beans   Fiber is low in calories, high in phytonutrients and helps you stay full longer   Aim for 25-35 grams per day by eating fiber with meals and snacks  Eat [id number]   Take 20-30 minutes to eat each meal by taking small bites, chewing foods to applesauce consistency or 20-30 times before you swallow   Eating foods too fast can delay satiety/fullness signals and increase overeating   Slow down your eating by using toddler utensils, putting your fork/spoon down between bites and not watching TV or emailing during meals!   Keep a Journal         Writing down what you eat, how you feel and when you are active helps you identify new changes to work on from week to week         Look for ways to cut 100 calories from your current diet 2-3 times per day  Drink 64 ounces of 0-Calorie drinks between meals   Water   Zero calorie Propel  or Vitamin Water     So[employer]  Zero Calories   Crystal Light , Sugar-Free Moi-Aid , and other sugar-free lemonade or  flavored payan   Keep Caffeine to less than 300mg per day ie: 3-6oz cups coffee     Work up to 45-60 minutes of physical activity most days of the week   Helps with losing weight and prevent regaining those extra pounds!    Do a combo of cardio (walking/water exercises) and strength training (lifting weights/Vinyasa yoga)    Avoid Mindless Eating   Be present when you eat--take note of the smell, taste and quality of your food   Make a list of alternative activities you could do to prevent eating out of boredom/stress  Go for a walk, call a friend, chew gum, paint your nails, re-organize the garage, etc

## 2023-04-22 ENCOUNTER — TRANSFERRED RECORDS (OUTPATIENT)
Dept: MULTI SPECIALTY CLINIC | Facility: CLINIC | Age: 48
End: 2023-04-22

## 2023-04-22 LAB — RETINOPATHY: NORMAL

## 2023-05-02 ENCOUNTER — HOSPITAL ENCOUNTER (OUTPATIENT)
Dept: MAMMOGRAPHY | Facility: CLINIC | Age: 48
Discharge: HOME | End: 2023-05-02
Attending: INTERNAL MEDICINE | Admitting: INTERNAL MEDICINE
Payer: COMMERCIAL

## 2023-05-02 DIAGNOSIS — Z12.31 VISIT FOR SCREENING MAMMOGRAM: ICD-10-CM

## 2023-05-02 PROCEDURE — 77067 SCR MAMMO BI INCL CAD: CPT

## 2023-07-13 DIAGNOSIS — E11.9 TYPE 2 DIABETES MELLITUS WITHOUT COMPLICATION, WITHOUT LONG-TERM CURRENT USE OF INSULIN (H): ICD-10-CM

## 2023-07-13 RX ORDER — METFORMIN HCL 500 MG
TABLET, EXTENDED RELEASE 24 HR ORAL
Qty: 180 TABLET | Refills: 0 | Status: SHIPPED | OUTPATIENT
Start: 2023-07-13 | End: 2023-09-19

## 2023-08-20 ENCOUNTER — HEALTH MAINTENANCE LETTER (OUTPATIENT)
Age: 48
End: 2023-08-20

## 2023-08-20 DIAGNOSIS — E78.5 DYSLIPIDEMIA: ICD-10-CM

## 2023-08-20 DIAGNOSIS — I10 PRIMARY HYPERTENSION: ICD-10-CM

## 2023-08-21 RX ORDER — LOSARTAN POTASSIUM 50 MG/1
TABLET ORAL
Qty: 90 TABLET | Refills: 0 | Status: SHIPPED | OUTPATIENT
Start: 2023-08-21 | End: 2023-09-19

## 2023-08-21 RX ORDER — ATORVASTATIN CALCIUM 20 MG/1
20 TABLET, FILM COATED ORAL DAILY
Qty: 90 TABLET | Refills: 0 | Status: SHIPPED | OUTPATIENT
Start: 2023-08-21 | End: 2023-09-19

## 2023-09-19 ENCOUNTER — OFFICE VISIT (OUTPATIENT)
Dept: FAMILY MEDICINE | Facility: CLINIC | Age: 48
End: 2023-09-19
Payer: COMMERCIAL

## 2023-09-19 VITALS
DIASTOLIC BLOOD PRESSURE: 88 MMHG | SYSTOLIC BLOOD PRESSURE: 134 MMHG | OXYGEN SATURATION: 100 % | HEIGHT: 61 IN | WEIGHT: 285 LBS | HEART RATE: 103 BPM | RESPIRATION RATE: 16 BRPM | TEMPERATURE: 99.2 F | BODY MASS INDEX: 53.81 KG/M2

## 2023-09-19 DIAGNOSIS — E78.5 DYSLIPIDEMIA: ICD-10-CM

## 2023-09-19 DIAGNOSIS — E11.69 TYPE 2 DIABETES MELLITUS WITH OTHER SPECIFIED COMPLICATION, WITHOUT LONG-TERM CURRENT USE OF INSULIN (H): ICD-10-CM

## 2023-09-19 DIAGNOSIS — E11.9 TYPE 2 DIABETES MELLITUS WITHOUT COMPLICATION, WITHOUT LONG-TERM CURRENT USE OF INSULIN (H): ICD-10-CM

## 2023-09-19 DIAGNOSIS — Z00.00 ROUTINE GENERAL MEDICAL EXAMINATION AT A HEALTH CARE FACILITY: Primary | ICD-10-CM

## 2023-09-19 DIAGNOSIS — Z12.11 SCREEN FOR COLON CANCER: ICD-10-CM

## 2023-09-19 DIAGNOSIS — Z13.29 THYROID DISORDER SCREENING: ICD-10-CM

## 2023-09-19 DIAGNOSIS — I10 PRIMARY HYPERTENSION: ICD-10-CM

## 2023-09-19 DIAGNOSIS — E66.01 MORBID OBESITY (H): ICD-10-CM

## 2023-09-19 PROCEDURE — 99207 PR FOOT EXAM NO CHARGE: CPT | Performed by: NURSE PRACTITIONER

## 2023-09-19 PROCEDURE — 99396 PREV VISIT EST AGE 40-64: CPT | Performed by: NURSE PRACTITIONER

## 2023-09-19 RX ORDER — LOSARTAN POTASSIUM 50 MG/1
TABLET ORAL
Qty: 90 TABLET | Refills: 3 | Status: SHIPPED | OUTPATIENT
Start: 2023-09-19

## 2023-09-19 RX ORDER — ATORVASTATIN CALCIUM 20 MG/1
20 TABLET, FILM COATED ORAL DAILY
Qty: 90 TABLET | Refills: 0 | Status: SHIPPED | OUTPATIENT
Start: 2023-09-19 | End: 2024-02-06

## 2023-09-19 RX ORDER — METFORMIN HCL 500 MG
500 TABLET, EXTENDED RELEASE 24 HR ORAL 2 TIMES DAILY WITH MEALS
Qty: 180 TABLET | Refills: 3 | Status: SHIPPED | OUTPATIENT
Start: 2023-09-19

## 2023-09-19 ASSESSMENT — ENCOUNTER SYMPTOMS
WEAKNESS: 0
EYE PAIN: 0
PALPITATIONS: 0
JOINT SWELLING: 0
NAUSEA: 0
PARESTHESIAS: 0
HEMATOCHEZIA: 0
ARTHRALGIAS: 0
HEMATURIA: 0
HEARTBURN: 0
CONSTIPATION: 0
NERVOUS/ANXIOUS: 0
CHILLS: 0
FEVER: 0
SHORTNESS OF BREATH: 0
SORE THROAT: 0
MYALGIAS: 0
DYSURIA: 0
COUGH: 0
ABDOMINAL PAIN: 0
HEADACHES: 1
BREAST MASS: 0
FREQUENCY: 0
DIARRHEA: 0
DIZZINESS: 0

## 2023-09-19 NOTE — PROGRESS NOTES
SUBJECTIVE:   CC: Dariela is an 48 year old who presents for preventive health visit.       9/19/2023    10:03 AM   Additional Questions   Roomed by Leila WILBURN CMA       Healthy Habits:     Getting at least 3 servings of Calcium per day:  NO    Bi-annual eye exam:  Yes    Dental care twice a year:  Yes    Sleep apnea or symptoms of sleep apnea:  Sleep apnea    Diet:  Regular (no restrictions)    Frequency of exercise:  1 day/week    Duration of exercise:  15-30 minutes    Taking medications regularly:  Yes    Additional concerns today:  No    Left shoulder, maybe rotator cuff injury but improving.     Today's PHQ-2 Score:       9/19/2023     8:41 AM   PHQ-2 ( 1999 [employer])   Q1: Little interest or pleasure in doing things 1   Q2: Feeling down, depressed or hopeless 0   PHQ-2 Score 1   Q1: Little interest or pleasure in doing things Several days   Q2: Feeling down, depressed or hopeless Not at all   PHQ-2 Score 1       Diabetes Follow-up    How often are you checking your blood sugar? A few times a week  What time of day are you checking your blood sugars (select all that apply)?   PM  Have you had any blood sugars above 200?  No  Have you had any blood sugars below 70?  No  What symptoms do you notice when your blood sugar is low?  Shaky  What concerns do you have today about your diabetes? None   Do you have any of these symptoms? (Select all that apply)  No numbness or tingling in feet.  No redness, sores or blisters on feet.  No complaints of excessive thirst.  No reports of blurry vision.  No significant changes to weight.    Would like to stop semaglutide. Feels she is not ready to focus on diet right now and isn't getting full benefit from it.         Dyslipidemia Follow-Up    Are you regularly taking any medication or supplement to lower your cholesterol?   Yes- atorvastatin  Are you having muscle aches or other side effects that you think could be caused by your cholesterol lowering medication?   No    Hypertension Follow-up    Do you check your blood pressure regularly outside of the clinic? Yes   Are you following a low salt diet? Yes  Are your blood pressures ever more than 140 on the top number (systolic) OR more   than 90 on the bottom number (diastolic), for example 140/90? No    BP Readings from Last 2 Encounters:   23 134/88   22 125/87     Hemoglobin A1C (%)   Date Value   10/31/2022 6.0 (H)   06/10/2022 6.1 (H)     LDL Cholesterol Calculated (mg/dL)   Date Value   10/31/2022 45   2022 149 (H)   2010 146 (H)   10/09/2007 124     Have you ever done Advance Care Planning? (For example, a Health Directive, POLST, or a discussion with a medical provider or your loved ones about your wishes): No, advance care planning information given to patient to review.  Patient declined advance care planning discussion at this time.    Social History     Tobacco Use    Smoking status: Some Days     Years: 5.00     Types: Cigarettes    Smokeless tobacco: Never    Tobacco comments:     3/2022 at 1-2 cigs per day & cutting down   Substance Use Topics    Alcohol use: Yes     Alcohol/week: 2.0 standard drinks of alcohol     Types: 2 Standard drinks or equivalent per week     Comment: socially-minimal             2023     8:41 AM   Alcohol Use   Prescreen: >3 drinks/day or >7 drinks/week? No     Reviewed orders with patient.  Reviewed health maintenance and updated orders accordingly - Yes      Breast Cancer Screenin/19/2023     8:42 AM   Breast CA Risk Assessment (FHS-7)   Do you have a family history of breast, colon, or ovarian cancer? No / Unknown       Mammogram Screening: Recommended annual mammography  Pertinent mammograms are reviewed under the imaging tab.    History of abnormal Pap smear: YES - updated in Problem List and Health Maintenance accordingly      Latest Ref Rng & Units 2022     4:55 PM 2020     1:12 PM 2020    12:40 PM   PAP / HPV   PAP   "Negative for Intraepithelial Lesion or Malignancy (NILM)      PAP (Historical)    ASC-US    HPV 16 DNA Negative Negative  Negative     HPV 18 DNA Negative Negative  Negative     Other HR HPV Negative Negative  Positive       Reviewed and updated as needed this visit by clinical staff   Tobacco  Allergies  Meds  Problems  Med Hx  Surg Hx  Fam Hx          Reviewed and updated as needed this visit by Provider                   Review of Systems   Constitutional:  Negative for chills and fever.   HENT:  Negative for congestion, ear pain, hearing loss and sore throat.    Eyes:  Negative for pain and visual disturbance.   Respiratory:  Negative for cough and shortness of breath.    Cardiovascular:  Negative for chest pain, palpitations and peripheral edema.   Gastrointestinal:  Negative for abdominal pain, constipation, diarrhea, heartburn, hematochezia and nausea.   Breasts:  Positive for tenderness. Negative for breast mass and discharge.   Genitourinary:  Negative for dysuria, frequency, genital sores, hematuria, pelvic pain, urgency, vaginal bleeding and vaginal discharge.   Musculoskeletal:  Negative for arthralgias, joint swelling and myalgias.   Skin:  Negative for rash.   Neurological:  Positive for headaches. Negative for dizziness, weakness and paresthesias.   Psychiatric/Behavioral:  Negative for mood changes. The patient is not nervous/anxious.         OBJECTIVE:   /88   Pulse 103   Temp 99.2  F (37.3  C) (Tympanic)   Resp 16   Ht 1.556 m (5' 1.25\")   Wt 129.3 kg (285 lb)   LMP 08/19/2023 (Approximate)   SpO2 100%   BMI 53.41 kg/m    Physical Exam  GENERAL: healthy, alert and no distress  EYES: Eyes grossly normal to inspection, PERRL and conjunctivae and sclerae normal  HENT: ear canals and TM's normal, nose and mouth without ulcers or lesions  NECK: no adenopathy, no asymmetry, masses, or scars and thyroid normal to palpation  RESP: lungs clear to auscultation - no rales, rhonchi or " wheezes  BREAST: normal without masses, tenderness or nipple discharge and no palpable axillary masses or adenopathy  CV: regular rate and rhythm, normal S1 S2, no S3 or S4, no murmur, click or rub, no peripheral edema and peripheral pulses strong  ABDOMEN: soft, nontender, no hepatosplenomegaly, no masses and bowel sounds normal  MS: no gross musculoskeletal defects noted, no edema  SKIN: no suspicious lesions or rashes  NEURO: Normal strength and tone, mentation intact and speech normal  PSYCH: mentation appears normal, affect normal/bright  Diabetic foot exam: normal DP and PT pulses, no trophic changes or ulcerative lesions, and normal sensory exam      ASSESSMENT/PLAN:   Dariela was seen today for physical.    Diagnoses and all orders for this visit:    Routine general medical examination at a health care facility  -     REVIEW OF HEALTH MAINTENANCE PROTOCOL ORDERS    Type 2 diabetes mellitus with other specified complication, without long-term current use of insulin (H)  -     HEMOGLOBIN A1C; Future  -     Albumin Random Urine Quantitative with Creat Ratio; Future  -     Lipid panel reflex to direct LDL Fasting; Future  -     Comprehensive metabolic panel (BMP + Alb, Alk Phos, ALT, AST, Total. Bili, TP); Future    Screen for colon cancer  -     Colonoscopy Screening  Referral; Future    Thyroid disorder screening  -     TSH with free T4 reflex; Future    Dyslipidemia  -     atorvastatin (LIPITOR) 20 MG tablet; Take 1 tablet (20 mg) by mouth daily    Primary hypertension  -     losartan (COZAAR) 50 MG tablet; TAKE 1 TABLET BY MOUTH DAILY  HOLD IF DIZZY OR LIGHTHEADED    Type 2 diabetes mellitus without complication, without long-term current use of insulin (H)  -     metFORMIN (GLUCOPHAGE XR) 500 MG 24 hr tablet; Take 1 tablet (500 mg) by mouth 2 times daily (with meals)    Morbid obesity (H)        Patient has been advised of split billing requirements and indicates understanding:  "Yes      COUNSELING:  Reviewed preventive health counseling, as reflected in patient instructions      BMI:   Estimated body mass index is 53.41 kg/m  as calculated from the following:    Height as of this encounter: 1.556 m (5' 1.25\").    Weight as of this encounter: 129.3 kg (285 lb).   Weight management plan: Discussed healthy diet and exercise guidelines      She reports that she has been smoking cigarettes. She has never used smokeless tobacco.  Nicotine/Tobacco Cessation Plan:   Information offered: Patient not interested at this time          Mandy Canela, [facility] [place]  "

## 2023-10-17 ENCOUNTER — TELEPHONE (OUTPATIENT)
Dept: GASTROENTEROLOGY | Facility: CLINIC | Age: 48
End: 2023-10-17
Payer: COMMERCIAL

## 2023-10-17 DIAGNOSIS — Z12.11 SPECIAL SCREENING FOR MALIGNANT NEOPLASMS, COLON: Primary | ICD-10-CM

## 2023-10-17 NOTE — TELEPHONE ENCOUNTER
"Endoscopy Scheduling Screen    Have you had a positive Covid test in the last 14 days?  No    Are you active on MyChart?   Yes    What insurance is in the chart?  Other:  [facility]    Ordering/Referring Provider:     RAY SOLITARIO      (If ordering provider performs procedure, schedule with ordering provider unless otherwise instructed. )    BMI: Estimated body mass index is 53.41 kg/m  as calculated from the following:    Height as of 9/19/23: 1.556 m (5' 1.25\").    Weight as of 9/19/23: 129.3 kg (285 lb).     Sedation Ordered  moderate sedation.   If patient BMI > 50 do not schedule in [facility].    If patient BMI > 45 do not schedule at [facility].    Are you taking methadone or Suboxone?  No    Are you taking any prescription medications for pain 3 or more times per week?   No    Do you have a history of malignant hyperthermia or adverse reaction to anesthesia?  No    (Females) Are you currently pregnant?   No     Have you been diagnosed or told you have pulmonary hypertension?   No    Do you have an LVAD?  No    Have you been told you have moderate to severe sleep apnea?  No    Have you been told you have COPD, asthma, or any other lung disease?  No    Do you have any heart conditions?  No     Have you ever had an organ transplant?   No    Have you ever had or are you awaiting a heart or lung transplant?   No    Have you had a stroke or transient ischemic attack (TIA aka \"mini stroke\" in the last 6 months?   No    Have you been diagnosed with or been told you have cirrhosis of the liver?   No    Are you currently on dialysis?   No    Do you need assistance transferring?   No    BMI: Estimated body mass index is 53.41 kg/m  as calculated from the following:    Height as of 9/19/23: 1.556 m (5' 1.25\").    Weight as of 9/19/23: 129.3 kg (285 lb).     Is patients BMI > 40 and scheduling location [facility]?  Yes (If MAC sedation is ordered, schedule PAC eval)    Do you take an injectable medication for weight loss or diabetes " (excluding insulin)?  Yes, hold time can be up to 7 days. Please check with you prescribing provider for recommendation.   oxempic  Do you take the medication Naltrexone?  No    Do you take blood thinners?  No       Prep   Are you currently on dialysis or do you have chronic kidney disease?  No    Do you have a diagnosis of diabetes?  Yes (Golytely Prep)    Do you have a diagnosis of cystic fibrosis (CF)?  No    On a regular basis do you go 3 -5 days between bowel movements?  No    BMI > 40?  Yes (Extended Prep)    Preferred Pharmacy:    [facility] [id number] [facility] [employer] - [place], [place] - [address]  [address] [address]  [facility] [address]  Phone: [phone] Fax: [phone]      Final Scheduling Details   Colonoscopy prep sent?  Golytely Extended Prep    Procedure scheduled  Colonoscopy    Surgeon:  Daren     Date of procedure:  1/16/24     Pre-OP / PAC:   No - Not required for this site.    Location  [facility] - Per order.    Sedation   Moderate Sedation - Per order.      Patient Reminders:   You will receive a call from a Nurse to review instructions and health history.  This assessment must be completed prior to your procedure.  Failure to complete the Nurse assessment may result in the procedure being cancelled.      On the day of your procedure, please designate an adult(s) who can drive you home stay with you for the next 24 hours. The medicines used in the exam will make you sleepy. You will not be able to drive.      You cannot take public transportation, ride share services, or non-medical taxi service without a responsible caregiver.  Medical transport services are allowed with the requirement that a responsible caregiver will receive you at your destination.  We require that drivers and caregivers are confirmed prior to your procedure.

## 2023-10-31 ENCOUNTER — LAB (OUTPATIENT)
Dept: LAB | Facility: CLINIC | Age: 48
End: 2023-10-31
Payer: COMMERCIAL

## 2023-10-31 DIAGNOSIS — Z13.29 THYROID DISORDER SCREENING: ICD-10-CM

## 2023-10-31 DIAGNOSIS — E11.69 TYPE 2 DIABETES MELLITUS WITH OTHER SPECIFIED COMPLICATION, WITHOUT LONG-TERM CURRENT USE OF INSULIN (H): ICD-10-CM

## 2023-10-31 LAB
ALBUMIN SERPL BCG-MCNC: 3.9 G/DL (ref 3.5–5.2)
ALP SERPL-CCNC: 62 U/L (ref 35–104)
ALT SERPL W P-5'-P-CCNC: 30 U/L (ref 0–50)
ANION GAP SERPL CALCULATED.3IONS-SCNC: 11 MMOL/L (ref 7–15)
AST SERPL W P-5'-P-CCNC: 24 U/L (ref 0–45)
BILIRUB SERPL-MCNC: 0.3 MG/DL
BUN SERPL-MCNC: 11.8 MG/DL (ref 6–20)
CALCIUM SERPL-MCNC: 9 MG/DL (ref 8.6–10)
CHLORIDE SERPL-SCNC: 104 MMOL/L (ref 98–107)
CHOLEST SERPL-MCNC: 123 MG/DL
CREAT SERPL-MCNC: 0.54 MG/DL (ref 0.51–0.95)
CREAT UR-MCNC: 205 MG/DL
DEPRECATED HCO3 PLAS-SCNC: 23 MMOL/L (ref 22–29)
EGFRCR SERPLBLD CKD-EPI 2021: >90 ML/MIN/1.73M2
GLUCOSE SERPL-MCNC: 115 MG/DL (ref 70–99)
HBA1C MFR BLD: 6 % (ref 0–5.6)
HDLC SERPL-MCNC: 52 MG/DL
LDLC SERPL CALC-MCNC: 51 MG/DL
MICROALBUMIN UR-MCNC: <12 MG/L
MICROALBUMIN/CREAT UR: NORMAL MG/G{CREAT}
NONHDLC SERPL-MCNC: 71 MG/DL
POTASSIUM SERPL-SCNC: 4.2 MMOL/L (ref 3.4–5.3)
PROT SERPL-MCNC: 6.7 G/DL (ref 6.4–8.3)
SODIUM SERPL-SCNC: 138 MMOL/L (ref 135–145)
TRIGL SERPL-MCNC: 101 MG/DL
TSH SERPL DL<=0.005 MIU/L-ACNC: 2.32 UIU/ML (ref 0.3–4.2)

## 2023-10-31 PROCEDURE — 84443 ASSAY THYROID STIM HORMONE: CPT

## 2023-10-31 PROCEDURE — 83036 HEMOGLOBIN GLYCOSYLATED A1C: CPT

## 2023-10-31 PROCEDURE — 36415 COLL VENOUS BLD VENIPUNCTURE: CPT

## 2023-10-31 PROCEDURE — 80053 COMPREHEN METABOLIC PANEL: CPT

## 2023-10-31 PROCEDURE — 80061 LIPID PANEL: CPT

## 2023-10-31 PROCEDURE — 82570 ASSAY OF URINE CREATININE: CPT

## 2023-10-31 PROCEDURE — 82043 UR ALBUMIN QUANTITATIVE: CPT

## 2023-12-19 ENCOUNTER — TELEPHONE (OUTPATIENT)
Dept: GASTROENTEROLOGY | Facility: CLINIC | Age: 48
End: 2023-12-19
Payer: COMMERCIAL

## 2023-12-19 NOTE — TELEPHONE ENCOUNTER
Caller: Dariela Killian    Reason for Reschedule/Cancellation (please be detailed, any staff messages or encounters to note?): Patient request to reschedule due to transportation      Prior to reschedule please review:  Ordering Provider: Mandy Canela CNP  Sedation per order: MODERATE  Does patient have any ASC Exclusions, please identify?: YES, BMI 53.4      Notes on Cancelled Procedure:  Procedure: Lower Endoscopy [Colonoscopy]   Date: 1/16  Location: [facility]; [address] [place] [address]  Surgeon: LAYA      Rescheduled: Yes  Procedure: Lower Endoscopy [Colonoscopy]   Date: 4/9  Location: [facility]; [address] [place] [address]  Surgeon: KAREN  Sedation Level Scheduled  MODERATE,  Reason for Sedation Level PER ORDER  Prep/Instructions updated and sent: [employer]       Send In - basket message to Panc - Arnaldo Pool if [facility]  procedure is canceled or rescheduled: [ N/A, YES or NO] N/A

## 2024-02-06 DIAGNOSIS — E78.5 DYSLIPIDEMIA: ICD-10-CM

## 2024-02-06 RX ORDER — ATORVASTATIN CALCIUM 20 MG/1
20 TABLET, FILM COATED ORAL DAILY
Qty: 90 TABLET | Refills: 2 | Status: SHIPPED | OUTPATIENT
Start: 2024-02-06

## 2024-03-17 ENCOUNTER — HEALTH MAINTENANCE LETTER (OUTPATIENT)
Age: 49
End: 2024-03-17

## 2024-03-22 RX ORDER — BISACODYL 5 MG/1
TABLET, DELAYED RELEASE ORAL
Qty: 4 TABLET | Refills: 0 | Status: SHIPPED | OUTPATIENT
Start: 2024-03-22 | End: 2024-05-02

## 2024-03-22 NOTE — TELEPHONE ENCOUNTER
Extended Golytely Bowel Prep . Instructions were sent via [employer]. Bowel prep was sent 3/22/2024 to    [facility] [id number] [place] [facility] [address] [address]

## 2024-03-23 ENCOUNTER — TELEPHONE (OUTPATIENT)
Dept: GASTROENTEROLOGY | Facility: CLINIC | Age: 49
End: 2024-03-23

## 2024-03-23 NOTE — TELEPHONE ENCOUNTER
Pre visit planning completed.      Procedure details:    Patient scheduled for Colonoscopy  on 04.09.2024.      Arrival time: .1000 Procedure time  1045 - arrival and procedure time has been changed    Facility location: [facility]; [address] [place] [address]. Check in location: Main entrance at . Come through the roundabout underneath the awning (not the ER entrance).    Sedation type: Conscious sedation     Pre op exam needed? N/A    Indication for procedure:  Screen for colon cancer       Chart review:     Electronic implanted devices? No    Recent diagnosis of diverticulitis within the last 6 weeks? No    Diabetic? Yes. Diabetic medication HOLDING recommendations: Oral diabetic medications: Yes:  Metformin (glucophage): HOLD day of procedure.   Diabetic injectables: Yes- Ozempic (Semaglutide). Weekly dosing of medication.  Hold 7 days before procedure.  Follow up with managing provider. Care everywhere       Medication review:    Anticoagulants? No    NSAIDS? No NSAID medications per patient's medication list.  RN will verify with pre-assessment call.    Other medication HOLDING recommendations:  Fiber x 7 days-care everywhere   Iron x 7 days-care everywhere      Prep for procedure:     Bowel prep recommendation: Extended Golytely. Bowel prep prescription sent to    [facility] [id number] [facility] [address]    Due to: BMI > 40.     Prep instructions sent via [employer]         Yusra Quintanilla RN  Endoscopy Procedure Pre Assessment RN  [phone] option 4

## 2024-03-26 NOTE — TELEPHONE ENCOUNTER
Attempted to contact patient in order to complete pre assessment questions.     No answer. Left message to return call to [phone] option 4    Callback required communication sent via [employer].      Yusra Resendez RN  Endoscopy Procedure Pre Assessment RN

## 2024-03-28 NOTE — TELEPHONE ENCOUNTER
Pre assessment completed for upcoming procedure.   (Please see previous telephone encounter notes for complete details)    Patient  returned call.       Procedure details:    Arrival time and facility location reviewed.    Pre op exam needed? N/A    Designated  policy reviewed. Instructed to have someone stay 6  hours post procedure.       Medication review:    Medications reviewed. Please see supporting documentation below. Holding recommendations discussed (if applicable).     -pt is not currently taking Iron or Fiber    Prep for procedure:     Procedure prep instructions reviewed.        Any additional information needed:  N/A      Patient  verbalized understanding and had no questions or concerns at this time.      Brooklyn Gonzales RN  Endoscopy Procedure Pre Assessment RN  [phone] option 4

## 2024-04-09 ENCOUNTER — HOSPITAL ENCOUNTER (OUTPATIENT)
Facility: CLINIC | Age: 49
Discharge: HOME | End: 2024-04-09
Attending: INTERNAL MEDICINE | Admitting: INTERNAL MEDICINE
Payer: COMMERCIAL

## 2024-04-09 VITALS
SYSTOLIC BLOOD PRESSURE: 120 MMHG | DIASTOLIC BLOOD PRESSURE: 77 MMHG | HEIGHT: 62 IN | OXYGEN SATURATION: 98 % | BODY MASS INDEX: 52.63 KG/M2 | RESPIRATION RATE: 16 BRPM | WEIGHT: 286 LBS | HEART RATE: 75 BPM

## 2024-04-09 LAB
COLONOSCOPY: NORMAL
GLUCOSE BLDC GLUCOMTR-MCNC: 124 MG/DL (ref 70–99)

## 2024-04-09 PROCEDURE — 45385 COLONOSCOPY W/LESION REMOVAL: CPT | Mod: PT | Performed by: INTERNAL MEDICINE

## 2024-04-09 PROCEDURE — 88305 TISSUE EXAM BY PATHOLOGIST: CPT | Mod: 26 | Performed by: PATHOLOGY

## 2024-04-09 PROCEDURE — 999N000099 HC STATISTIC MODERATE SEDATION < 10 MIN: Performed by: INTERNAL MEDICINE

## 2024-04-09 PROCEDURE — G0500 MOD SEDAT ENDO SERVICE >5YRS: HCPCS | Mod: PT | Performed by: INTERNAL MEDICINE

## 2024-04-09 PROCEDURE — 82962 GLUCOSE BLOOD TEST: CPT

## 2024-04-09 PROCEDURE — 88305 TISSUE EXAM BY PATHOLOGIST: CPT | Mod: TC | Performed by: INTERNAL MEDICINE

## 2024-04-09 PROCEDURE — 45382 COLONOSCOPY W/CONTROL BLEED: CPT | Performed by: INTERNAL MEDICINE

## 2024-04-09 PROCEDURE — 250N000011 HC RX IP 250 OP 636: Performed by: INTERNAL MEDICINE

## 2024-04-09 RX ORDER — FLUMAZENIL 0.1 MG/ML
0.2 INJECTION, SOLUTION INTRAVENOUS
Status: DISCONTINUED | OUTPATIENT
Start: 2024-04-09 | End: 2024-04-09 | Stop reason: HOSPADM

## 2024-04-09 RX ORDER — ONDANSETRON 2 MG/ML
4 INJECTION INTRAMUSCULAR; INTRAVENOUS
Status: DISCONTINUED | OUTPATIENT
Start: 2024-04-09 | End: 2024-04-09 | Stop reason: HOSPADM

## 2024-04-09 RX ORDER — NALOXONE HYDROCHLORIDE 0.4 MG/ML
0.2 INJECTION, SOLUTION INTRAMUSCULAR; INTRAVENOUS; SUBCUTANEOUS
Status: DISCONTINUED | OUTPATIENT
Start: 2024-04-09 | End: 2024-04-09 | Stop reason: HOSPADM

## 2024-04-09 RX ORDER — SIMETHICONE 40MG/0.6ML
133 SUSPENSION, DROPS(FINAL DOSAGE FORM)(ML) ORAL
Status: DISCONTINUED | OUTPATIENT
Start: 2024-04-09 | End: 2024-04-09 | Stop reason: HOSPADM

## 2024-04-09 RX ORDER — EPINEPHRINE 1 MG/ML
0.1 INJECTION, SOLUTION INTRAMUSCULAR; SUBCUTANEOUS
Status: DISCONTINUED | OUTPATIENT
Start: 2024-04-09 | End: 2024-04-09 | Stop reason: HOSPADM

## 2024-04-09 RX ORDER — LIDOCAINE 40 MG/G
CREAM TOPICAL
Status: DISCONTINUED | OUTPATIENT
Start: 2024-04-09 | End: 2024-04-09 | Stop reason: HOSPADM

## 2024-04-09 RX ORDER — ATROPINE SULFATE 0.1 MG/ML
1 INJECTION INTRAVENOUS
Status: DISCONTINUED | OUTPATIENT
Start: 2024-04-09 | End: 2024-04-09 | Stop reason: HOSPADM

## 2024-04-09 RX ORDER — NALOXONE HYDROCHLORIDE 0.4 MG/ML
0.4 INJECTION, SOLUTION INTRAMUSCULAR; INTRAVENOUS; SUBCUTANEOUS
Status: DISCONTINUED | OUTPATIENT
Start: 2024-04-09 | End: 2024-04-09 | Stop reason: HOSPADM

## 2024-04-09 RX ORDER — PROCHLORPERAZINE MALEATE 10 MG
10 TABLET ORAL EVERY 6 HOURS PRN
Status: DISCONTINUED | OUTPATIENT
Start: 2024-04-09 | End: 2024-04-09 | Stop reason: HOSPADM

## 2024-04-09 RX ORDER — DIPHENHYDRAMINE HYDROCHLORIDE 50 MG/ML
25-50 INJECTION INTRAMUSCULAR; INTRAVENOUS
Status: DISCONTINUED | OUTPATIENT
Start: 2024-04-09 | End: 2024-04-09 | Stop reason: HOSPADM

## 2024-04-09 RX ORDER — FENTANYL CITRATE 50 UG/ML
50-100 INJECTION, SOLUTION INTRAMUSCULAR; INTRAVENOUS EVERY 5 MIN PRN
Status: DISCONTINUED | OUTPATIENT
Start: 2024-04-09 | End: 2024-04-09 | Stop reason: HOSPADM

## 2024-04-09 RX ORDER — ONDANSETRON 2 MG/ML
4 INJECTION INTRAMUSCULAR; INTRAVENOUS EVERY 6 HOURS PRN
Status: DISCONTINUED | OUTPATIENT
Start: 2024-04-09 | End: 2024-04-09 | Stop reason: HOSPADM

## 2024-04-09 RX ORDER — ONDANSETRON 4 MG/1
4 TABLET, ORALLY DISINTEGRATING ORAL EVERY 6 HOURS PRN
Status: DISCONTINUED | OUTPATIENT
Start: 2024-04-09 | End: 2024-04-09 | Stop reason: HOSPADM

## 2024-04-09 RX ADMIN — FENTANYL CITRATE 100 MCG: 50 INJECTION, SOLUTION INTRAMUSCULAR; INTRAVENOUS at 11:04

## 2024-04-09 RX ADMIN — MIDAZOLAM 2 MG: 1 INJECTION INTRAMUSCULAR; INTRAVENOUS at 11:04

## 2024-04-09 ASSESSMENT — ACTIVITIES OF DAILY LIVING (ADL)
ADLS_ACUITY_SCORE: 35

## 2024-04-09 NOTE — H&P
Pre-Endoscopy History and Physical     Dariela Killian MRN# [id number]   YOB: 1975 Age: 48 year old     Date of Procedure: 4/9/2024  Primary care provider: No Ref-Primary, Physician  Type of Endoscopy: Colonoscopy with possible biopsy, possible polypectomy  Reason for Procedure: screen  Type of Anesthesia Anticipated: Conscious Sedation    HPI:    Dariela is a 48 year old female who will be undergoing the above procedure.      A history and physical has been performed. The patient's medications and allergies have been reviewed. The risks and benefits of the procedure and the sedation options and risks were discussed with the patient.  All questions were answered and informed consent was obtained.      She denies a personal or family history of anesthesia complications or bleeding disorders.     Patient Active Problem List   Diagnosis    Allergic rhinitis    Obesity    CARDIOVASCULAR SCREENING; LDL GOAL LESS THAN 160    ASCUS with positive high risk HPV cervical    Diabetes mellitus, type 2 (H)    Morbid obesity (H)    High cholesterol    Heartburn        Past Medical History:   Diagnosis Date    Abnormal Pap smear of cervix     2010,2011,  6/12, 11/18/20    Allergic rhinitis, cause unspecified     Allergic rhinitis - OTC using claritin    High risk HPV infection     2013, 5/2015, 01/12/17, 05/24/18, 2019, 2020    Hx of colposcopy with cervical biopsy 07/03/2015, 03/13/17    WNL, 03/13/17: [place] WNL.     Obesity, unspecified     diet and exercising        Past Surgical History:   Procedure Laterality Date    TONSILLECTOMY  1988       Social History     Tobacco Use    Smoking status: Some Days     Years: 5     Types: Cigarettes    Smokeless tobacco: Never    Tobacco comments:     3/2022 at 1-2 cigs per day & cutting down   Substance Use Topics    Alcohol use: Yes     Alcohol/week: 2.0 standard drinks of alcohol     Types: 2 Standard drinks or equivalent per week     Comment: socially-minimal        Family History   Problem Relation Age of Onset    Allergies Mother     Other - See Comments Mother         had a possible lumpectomy    Neurologic Disorder Father         Parkinsons and ?dementia    Cancer Maternal Grandmother         stomach cancer     Allergies Brother     Bladder Cancer Brother     Deep Vein Thrombosis (DVT) No family hx of     Colon Cancer No family hx of        Prior to Admission medications    Medication Sig Start Date End Date Taking? Authorizing Provider   atorvastatin (LIPITOR) 20 MG tablet TAKE 1 TABLET BY MOUTH EVERY DAY 2/6/24  Yes Mandy Canela CNP   losartan (COZAAR) 50 MG tablet TAKE 1 TABLET BY MOUTH DAILY  HOLD IF DIZZY OR LIGHTHEADED 9/19/23  Yes Mandy Canela CNP   metFORMIN (GLUCOPHAGE XR) 500 MG 24 hr tablet Take 1 tablet (500 mg) by mouth 2 times daily (with meals) 9/19/23  Yes Mandy Canela CNP   bisacodyl (DULCOLAX) 5 MG EC tablet Two days prior to exam take two (2) tablets at 4pm. One day prior to exam take two (2) tablets at 4pm 3/22/24   Minal Mercedes MD   blood glucose (NO BRAND SPECIFIED) lancets standard Use to test blood sugar 1 time daily or as directed. 4/5/22   Crystal Holbrook MD   blood glucose (NO BRAND SPECIFIED) test strip Use to test blood sugar 1 time daily or as directed. 4/5/22   Crystal Holbrook MD   blood glucose monitoring (NO BRAND SPECIFIED) meter device kit Use to test blood sugar 1 time daily or as directed. 4/5/22   Crystal Holbrook MD   MULTI-VITAMIN OR TABS 1 daily 4/19/06   Luna Almanzar DO   polyethylene glycol (GOLYTELY) 236 g suspension Take as directed. Two days before your exam fill the first container with water. Cover and shake until mixed well. At 5:00pm drink one 8oz glass every 10-15 minutes until half (1/2) of the first container is empty. Store the remainder in the refrigerator. One day before your exam at 5:00pm drink the second half of the first container until it is gone. Before you  "go to bed mix the second container with water and put in refrigerator. Six hours before your check in time drink one 8oz glass every 10-15 minutes until half of container is empty. Discard the remainder of solution. 3/22/24   Minal Mercedes MD   tranexamic acid (LYSTEDA) 650 MG tablet Take 2 tablets (1,300 mg) by mouth 3 times daily 4/12/23   Yas Paredes MD   ZYRTEC PO Take 10 mg by mouth daily    Reported, Patient       Allergies   Allergen Reactions    Pcn [Penicillins] Hives    Seasonal Allergies         REVIEW OF SYSTEMS:   5 point ROS negative except as noted above in HPI, including Gen., Resp., CV, GI &  system review.    PHYSICAL EXAM:   Ht 1.562 m (5' 1.5\")   Wt 129.7 kg (286 lb)   BMI 53.16 kg/m   Estimated body mass index is 53.16 kg/m  as calculated from the following:    Height as of this encounter: 1.562 m (5' 1.5\").    Weight as of this encounter: 129.7 kg (286 lb).   GENERAL APPEARANCE: alert, and oriented  MENTAL STATUS: alert  AIRWAY EXAM: Mallampatti Class I (visualization of the soft palate, fauces, uvula, anterior and posterior pillars)  RESP: lungs clear to auscultation - no rales, rhonchi or wheezes  CV: regular rates and rhythm  DIAGNOSTICS:    Not indicated    IMPRESSION   ASA Class 2 - Mild systemic disease    PLAN:   Plan for Colonoscopy with possible biopsy, possible polypectomy. We discussed the risks, benefits and alternatives and the patient wished to proceed.    The above has been forwarded to the consulting provider.      Signed Electronically by: Elijah Foley MD  April 9, 2024          "

## 2024-05-02 ENCOUNTER — OFFICE VISIT (OUTPATIENT)
Dept: MIDWIFE SERVICES | Facility: CLINIC | Age: 49
End: 2024-05-02
Attending: ADVANCED PRACTICE MIDWIFE
Payer: COMMERCIAL

## 2024-05-02 VITALS
SYSTOLIC BLOOD PRESSURE: 118 MMHG | WEIGHT: 293 LBS | HEART RATE: 95 BPM | DIASTOLIC BLOOD PRESSURE: 83 MMHG | BODY MASS INDEX: 55.17 KG/M2 | OXYGEN SATURATION: 96 %

## 2024-05-02 DIAGNOSIS — E11.9 TYPE 2 DIABETES MELLITUS WITHOUT COMPLICATION, WITHOUT LONG-TERM CURRENT USE OF INSULIN (H): ICD-10-CM

## 2024-05-02 DIAGNOSIS — Z30.011 ENCOUNTER FOR INITIAL PRESCRIPTION OF CONTRACEPTIVE PILLS: Primary | ICD-10-CM

## 2024-05-02 LAB — HBA1C MFR BLD: 6.6 % (ref 0–5.6)

## 2024-05-02 PROCEDURE — 36415 COLL VENOUS BLD VENIPUNCTURE: CPT | Performed by: ADVANCED PRACTICE MIDWIFE

## 2024-05-02 PROCEDURE — 83036 HEMOGLOBIN GLYCOSYLATED A1C: CPT | Performed by: ADVANCED PRACTICE MIDWIFE

## 2024-05-02 PROCEDURE — 99459 PELVIC EXAMINATION: CPT | Performed by: ADVANCED PRACTICE MIDWIFE

## 2024-05-02 PROCEDURE — 99213 OFFICE O/P EST LOW 20 MIN: CPT | Performed by: ADVANCED PRACTICE MIDWIFE

## 2024-05-02 RX ORDER — ACETAMINOPHEN AND CODEINE PHOSPHATE 120; 12 MG/5ML; MG/5ML
0.35 SOLUTION ORAL DAILY
Qty: 84 TABLET | Refills: 3 | Status: SHIPPED | OUTPATIENT
Start: 2024-05-02

## 2024-05-02 ASSESSMENT — ANXIETY QUESTIONNAIRES
1. FEELING NERVOUS, ANXIOUS, OR ON EDGE: NOT AT ALL
3. WORRYING TOO MUCH ABOUT DIFFERENT THINGS: NOT AT ALL
6. BECOMING EASILY ANNOYED OR IRRITABLE: NOT AT ALL
5. BEING SO RESTLESS THAT IT IS HARD TO SIT STILL: NOT AT ALL
GAD7 TOTAL SCORE: 0
7. FEELING AFRAID AS IF SOMETHING AWFUL MIGHT HAPPEN: NOT AT ALL
GAD7 TOTAL SCORE: 0
2. NOT BEING ABLE TO STOP OR CONTROL WORRYING: NOT AT ALL

## 2024-05-02 ASSESSMENT — PATIENT HEALTH QUESTIONNAIRE - PHQ9
5. POOR APPETITE OR OVEREATING: NOT AT ALL
SUM OF ALL RESPONSES TO PHQ QUESTIONS 1-9: 4

## 2024-05-02 NOTE — PROGRESS NOTES
S: Patient presents to clinic to discuss contraception. She is sexually active and has regular monthly periods. Attempted an IUD placement without success. Normotensive, denies personal or family history of blood or clotting disorders. She is a current smoker, trying to quit. Additionally, she has concerns about an area of itchy skin on her right labia. Reports that it is always mildly itchy but just before her cycle symptoms become more significant. Describes it as feeling rougher than the surrounding tissue. Denies any vaginal symptoms including itching, burning, irritation, change in discharge amount or abnormal odor.     O:   /83   Pulse 95   Wt 134.6 kg (296 lb 12.8 oz)   LMP 04/15/2024 (Approximate)   SpO2 96%   BMI 55.17 kg/m      General: well appearing, in NAD  Cardiac: well perfused  Respiratory: non-labored breathing on room air   Psych: alert and oriented   Pelvic: Vulva appears normal, unable to visualize area of concern. Patient points to right labia where minora meets majora. Not able to appreciate a difference in skin texture with gloves on.     A/P:  (Z30.011) Encounter for initial prescription of contraceptive pills  (primary encounter diagnosis)  Comment: Discussed increased risk of blood clots with estrogen containing birth control given active smoker status. Pt reports she is trying to quit. Recommend starting with progesterone only pill and can consider switching when she quits. Reviewed use, when to start.   Plan: norethindrone (MICRONOR) 0.35 MG tablet    (E11.9) Type 2 diabetes mellitus without complication, without long-term current use of insulin (H)  Comment: Due for AIc, patient agrees to do today  Plan: Hemoglobin A1c          Discussed options for addressing labial itching. Given diabetes diagnosis would start with topical anti-fungal like Monistat. Would consider estrogen cream if Monistat not helpful.     Erica Barrera CNM

## 2024-05-15 ENCOUNTER — ANCILLARY PROCEDURE (OUTPATIENT)
Dept: MAMMOGRAPHY | Facility: CLINIC | Age: 49
End: 2024-05-15
Payer: COMMERCIAL

## 2024-05-15 PROCEDURE — 77067 SCR MAMMO BI INCL CAD: CPT | Mod: TC | Performed by: RADIOLOGY

## 2024-05-15 PROCEDURE — 77063 BREAST TOMOSYNTHESIS BI: CPT | Mod: TC | Performed by: RADIOLOGY

## 2024-07-16 NOTE — PROGRESS NOTES
Admission Status; Secondary Review Determination         Under the authority of the Utilization Management Committee, the utilization review process indicated a secondary review on the above patient.  The review outcome is based on review of the medical records, discussions with staff, and applying clinical experience noted on the date of the review.        (x)      Inpatient Status Appropriate - This patient's medical care is consistent with medical management for inpatient care and reasonable inpatient medical practice.       RATIONALE FOR DETERMINATION   The patient is a 82-year-old female admitted on 7/15/2024.  Patient states that she is having 5 episodes of bright red bleeding rectally associated with lower abdominal cramping and lightheadedness.  She takes famotidine.  Last episode was August 2023.  Hemoglobin has shown significant drop from last month of 11.0 to a current 6.8 with likely impending packed red blood cell unit to be transfused.  Patient also was noted to have an elevated creatinine of 1.01.  The patient has diabetes mellitus also.  The patient is currently n.p.o. pending procedures.  GI has been consulted Protonix has been ordered every 12 hours.  Given multiple midnight stay pending endoscopic evaluations and possibly additional testing to determine the origin of active bleeding, recommend changing from observation to inpatient status.  Dr. Craig will be notified of this recommendation.      The severity of illness, intensity of service provided, expected LOS and risk for adverse outcome make the care complex, high risk and appropriate for hospital admission.        The information on this document is developed by the utilization review team in order for the business office to ensure compliance.  This only denotes the appropriateness of proper admission status and does not reflect the quality of care rendered.         The definitions of Inpatient Status and Observation Status used in  Please notify patient of he Hematology recommendation, they recommend yearly monitoring of white blood cell counts.    Patient assessment and information reviewed: 48 YO woman with DM found to have asymptomatic chronic mild neutrophilic leukocytosis, likely benign. No clear cause such as smoking or medications, but also no concerning features.     Recommendations: Continue annual monitoring and as needed with any new symptoms concerning for hematologic disorders such as B symptoms and new bruising/bleeding symptoms.  Otherwise annual CBC and differential is sufficient for monitoring.        making the determination above are those provided in the CMS Coverage Manual, Chapter 1 and Chapter 6, section 70.4.      Sincerely,     Kian Chavez MD  Physician Advisor  Utilization Review/ Case Management  [facility].

## 2024-08-04 ENCOUNTER — HEALTH MAINTENANCE LETTER (OUTPATIENT)
Age: 49
End: 2024-08-04

## 2024-09-28 ENCOUNTER — TRANSFERRED RECORDS (OUTPATIENT)
Dept: MULTI SPECIALTY CLINIC | Facility: CLINIC | Age: 49
End: 2024-09-28

## 2024-09-28 LAB — RETINOPATHY: NORMAL

## 2024-10-10 ENCOUNTER — TELEPHONE (OUTPATIENT)
Dept: FAMILY MEDICINE | Facility: CLINIC | Age: 49
End: 2024-10-10
Payer: COMMERCIAL

## 2024-10-10 DIAGNOSIS — E11.9 TYPE 2 DIABETES MELLITUS WITHOUT COMPLICATION, WITHOUT LONG-TERM CURRENT USE OF INSULIN (H): ICD-10-CM

## 2024-10-10 RX ORDER — METFORMIN HYDROCHLORIDE 500 MG/1
500 TABLET, EXTENDED RELEASE ORAL 2 TIMES DAILY WITH MEALS
Qty: 180 TABLET | Refills: 0 | Status: SHIPPED | OUTPATIENT
Start: 2024-10-10

## 2024-11-12 SDOH — HEALTH STABILITY: PHYSICAL HEALTH: ON AVERAGE, HOW MANY DAYS PER WEEK DO YOU ENGAGE IN MODERATE TO STRENUOUS EXERCISE (LIKE A BRISK WALK)?: 0 DAYS

## 2024-11-12 SDOH — HEALTH STABILITY: PHYSICAL HEALTH: ON AVERAGE, HOW MANY MINUTES DO YOU ENGAGE IN EXERCISE AT THIS LEVEL?: 0 MIN

## 2024-11-12 ASSESSMENT — SOCIAL DETERMINANTS OF HEALTH (SDOH): HOW OFTEN DO YOU GET TOGETHER WITH FRIENDS OR RELATIVES?: ONCE A WEEK

## 2024-11-18 ENCOUNTER — OFFICE VISIT (OUTPATIENT)
Dept: FAMILY MEDICINE | Facility: CLINIC | Age: 49
End: 2024-11-18
Payer: COMMERCIAL

## 2024-11-18 VITALS
SYSTOLIC BLOOD PRESSURE: 118 MMHG | HEART RATE: 120 BPM | HEIGHT: 62 IN | OXYGEN SATURATION: 97 % | WEIGHT: 291.3 LBS | DIASTOLIC BLOOD PRESSURE: 78 MMHG | RESPIRATION RATE: 18 BRPM | TEMPERATURE: 98.5 F | BODY MASS INDEX: 53.6 KG/M2

## 2024-11-18 DIAGNOSIS — Z00.00 ROUTINE PHYSICAL EXAMINATION: Primary | ICD-10-CM

## 2024-11-18 DIAGNOSIS — I10 PRIMARY HYPERTENSION: ICD-10-CM

## 2024-11-18 DIAGNOSIS — Z30.09 GENERAL COUNSELING FOR PRESCRIPTION OF ORAL CONTRACEPTIVES: ICD-10-CM

## 2024-11-18 DIAGNOSIS — Z13.21 ENCOUNTER FOR VITAMIN DEFICIENCY SCREENING: ICD-10-CM

## 2024-11-18 DIAGNOSIS — Z13.220 SCREENING CHOLESTEROL LEVEL: ICD-10-CM

## 2024-11-18 DIAGNOSIS — Z13.0 SCREENING FOR DEFICIENCY ANEMIA: ICD-10-CM

## 2024-11-18 DIAGNOSIS — E78.5 DYSLIPIDEMIA: ICD-10-CM

## 2024-11-18 DIAGNOSIS — E11.9 TYPE 2 DIABETES MELLITUS WITHOUT COMPLICATION, WITHOUT LONG-TERM CURRENT USE OF INSULIN (H): ICD-10-CM

## 2024-11-18 DIAGNOSIS — Z13.1 SCREENING FOR DIABETES MELLITUS: ICD-10-CM

## 2024-11-18 DIAGNOSIS — Z13.29 THYROID DISORDER SCREENING: ICD-10-CM

## 2024-11-18 DIAGNOSIS — Z12.4 CERVICAL CANCER SCREENING: ICD-10-CM

## 2024-11-18 LAB
ALBUMIN SERPL BCG-MCNC: 4.3 G/DL (ref 3.5–5.2)
ALP SERPL-CCNC: 58 U/L (ref 40–150)
ALT SERPL W P-5'-P-CCNC: 23 U/L (ref 0–50)
ANION GAP SERPL CALCULATED.3IONS-SCNC: 12 MMOL/L (ref 7–15)
AST SERPL W P-5'-P-CCNC: 22 U/L (ref 0–45)
BILIRUB SERPL-MCNC: 0.5 MG/DL
BUN SERPL-MCNC: 11.7 MG/DL (ref 6–20)
CALCIUM SERPL-MCNC: 9.4 MG/DL (ref 8.8–10.4)
CHLORIDE SERPL-SCNC: 105 MMOL/L (ref 98–107)
CHOLEST SERPL-MCNC: 150 MG/DL
CREAT SERPL-MCNC: 0.63 MG/DL (ref 0.51–0.95)
CREAT UR-MCNC: 258 MG/DL
EGFRCR SERPLBLD CKD-EPI 2021: >90 ML/MIN/1.73M2
ERYTHROCYTE [DISTWIDTH] IN BLOOD BY AUTOMATED COUNT: 13.5 % (ref 10–15)
EST. AVERAGE GLUCOSE BLD GHB EST-MCNC: 134 MG/DL
FASTING STATUS PATIENT QL REPORTED: YES
FASTING STATUS PATIENT QL REPORTED: YES
GLUCOSE SERPL-MCNC: 134 MG/DL (ref 70–99)
HBA1C MFR BLD: 6.3 % (ref 0–5.6)
HCO3 SERPL-SCNC: 22 MMOL/L (ref 22–29)
HCT VFR BLD AUTO: 43.9 % (ref 35–47)
HDLC SERPL-MCNC: 50 MG/DL
HGB BLD-MCNC: 14.9 G/DL (ref 11.7–15.7)
LDLC SERPL CALC-MCNC: 76 MG/DL
MCH RBC QN AUTO: 28.4 PG (ref 26.5–33)
MCHC RBC AUTO-ENTMCNC: 33.9 G/DL (ref 31.5–36.5)
MCV RBC AUTO: 84 FL (ref 78–100)
MICROALBUMIN UR-MCNC: 19.4 MG/L
MICROALBUMIN/CREAT UR: 7.52 MG/G CR (ref 0–25)
NONHDLC SERPL-MCNC: 100 MG/DL
PLATELET # BLD AUTO: 237 10E3/UL (ref 150–450)
POTASSIUM SERPL-SCNC: 4 MMOL/L (ref 3.4–5.3)
PROT SERPL-MCNC: 7.1 G/DL (ref 6.4–8.3)
RBC # BLD AUTO: 5.25 10E6/UL (ref 3.8–5.2)
SODIUM SERPL-SCNC: 139 MMOL/L (ref 135–145)
TRIGL SERPL-MCNC: 121 MG/DL
TSH SERPL DL<=0.005 MIU/L-ACNC: 2.31 UIU/ML (ref 0.3–4.2)
VIT D+METAB SERPL-MCNC: 30 NG/ML (ref 20–50)
WBC # BLD AUTO: 11 10E3/UL (ref 4–11)

## 2024-11-18 PROCEDURE — 82570 ASSAY OF URINE CREATININE: CPT | Performed by: NURSE PRACTITIONER

## 2024-11-18 PROCEDURE — 36415 COLL VENOUS BLD VENIPUNCTURE: CPT | Performed by: NURSE PRACTITIONER

## 2024-11-18 PROCEDURE — 80061 LIPID PANEL: CPT | Performed by: NURSE PRACTITIONER

## 2024-11-18 PROCEDURE — 90656 IIV3 VACC NO PRSV 0.5 ML IM: CPT | Performed by: NURSE PRACTITIONER

## 2024-11-18 PROCEDURE — 91320 SARSCV2 VAC 30MCG TRS-SUC IM: CPT | Performed by: NURSE PRACTITIONER

## 2024-11-18 PROCEDURE — 85027 COMPLETE CBC AUTOMATED: CPT | Performed by: NURSE PRACTITIONER

## 2024-11-18 PROCEDURE — 90480 ADMN SARSCOV2 VAC 1/ONLY CMP: CPT | Performed by: NURSE PRACTITIONER

## 2024-11-18 PROCEDURE — 99396 PREV VISIT EST AGE 40-64: CPT | Mod: 25 | Performed by: NURSE PRACTITIONER

## 2024-11-18 PROCEDURE — 82043 UR ALBUMIN QUANTITATIVE: CPT | Performed by: NURSE PRACTITIONER

## 2024-11-18 PROCEDURE — 80053 COMPREHEN METABOLIC PANEL: CPT | Performed by: NURSE PRACTITIONER

## 2024-11-18 PROCEDURE — 90471 IMMUNIZATION ADMIN: CPT | Performed by: NURSE PRACTITIONER

## 2024-11-18 PROCEDURE — 84443 ASSAY THYROID STIM HORMONE: CPT | Performed by: NURSE PRACTITIONER

## 2024-11-18 PROCEDURE — 82306 VITAMIN D 25 HYDROXY: CPT | Performed by: NURSE PRACTITIONER

## 2024-11-18 PROCEDURE — 83036 HEMOGLOBIN GLYCOSYLATED A1C: CPT | Performed by: NURSE PRACTITIONER

## 2024-11-18 PROCEDURE — 99214 OFFICE O/P EST MOD 30 MIN: CPT | Mod: 25 | Performed by: NURSE PRACTITIONER

## 2024-11-18 RX ORDER — ATORVASTATIN CALCIUM 20 MG/1
20 TABLET, FILM COATED ORAL DAILY
Qty: 90 TABLET | Refills: 3 | Status: SHIPPED | OUTPATIENT
Start: 2024-11-18

## 2024-11-18 RX ORDER — NORETHINDRONE ACETATE AND ETHINYL ESTRADIOL .02; 1 MG/1; MG/1
1 TABLET ORAL DAILY
Qty: 84 TABLET | Refills: 3 | Status: SHIPPED | OUTPATIENT
Start: 2024-11-18

## 2024-11-18 RX ORDER — LOSARTAN POTASSIUM 50 MG/1
TABLET ORAL
Qty: 90 TABLET | Refills: 3 | Status: SHIPPED | OUTPATIENT
Start: 2024-11-18

## 2024-11-18 RX ORDER — METFORMIN HYDROCHLORIDE 500 MG/1
500 TABLET, EXTENDED RELEASE ORAL 2 TIMES DAILY WITH MEALS
Qty: 180 TABLET | Refills: 3 | Status: SHIPPED | OUTPATIENT
Start: 2024-11-18

## 2024-11-18 ASSESSMENT — PAIN SCALES - GENERAL: PAINLEVEL_OUTOF10: NO PAIN (0)

## 2024-11-18 NOTE — PROGRESS NOTES
Preventive Care Visit  [facility]  Mandy Canela CNP, Nurse Practitioner - Family  Nov 18, 2024      Assessment & Plan     Routine physical examination  - INFLUENZA VACCINE,SPLIT VIRUS,TRIVALENT,PF(FLUZONE)  - COVID-19 12+ (PFIZER)  - REVIEW OF HEALTH MAINTENANCE PROTOCOL ORDERS    Type 2 diabetes mellitus without complication, without long-term current use of insulin (H)  - HEMOGLOBIN A1C  - Albumin Random Urine Quantitative with Creat Ratio  - metFORMIN (GLUCOPHAGE XR) 500 MG 24 hr tablet  Dispense: 180 tablet; Refill: 3  - HEMOGLOBIN A1C  - Albumin Random Urine Quantitative with Creat Ratio    Cervical cancer screening  Up to date.    Screening for diabetes mellitus  - Comprehensive metabolic panel  - Comprehensive metabolic panel    Screening cholesterol level  - Lipid panel reflex to direct LDL Fasting  - Lipid panel reflex to direct LDL Fasting    Encounter for vitamin deficiency screening  - Vitamin D Deficiency  - Vitamin D Deficiency    Screening for deficiency anemia  - CBC with platelets  - CBC with platelets    Thyroid disorder screening  - TSH with free T4 reflex  - TSH with free T4 reflex    General counseling for prescription of oral contraceptives  Change to combo pill since very rare cigarette use. Should have better control of menses.  - norethindrone-ethinyl estradiol (MICROGESTIN 1/20) 1-20 MG-MCG tablet  Dispense: 84 tablet; Refill: 3    Dyslipidemia  - atorvastatin (LIPITOR) 20 MG tablet  Dispense: 90 tablet; Refill: 3    Primary hypertension  - losartan (COZAAR) 50 MG tablet  Dispense: 90 tablet; Refill: 3      Patient has been advised of split billing requirements and indicates understanding: Yes        Nicotine/Tobacco Cessation  She reports that she has been smoking cigarettes. She has never used smokeless tobacco.  Nicotine/Tobacco Cessation Plan  Information offered: Patient not interested at this time      BMI  Estimated body mass index is 53.28 kg/m  as  "calculated from the following:    Height as of this encounter: 1.575 m (5' 2\").    Weight as of this encounter: 132.1 kg (291 lb 4.8 oz).   Weight management plan: Discussed healthy diet and exercise guidelines    Counseling  Appropriate preventive services were addressed with this patient via screening, questionnaire, or discussion as appropriate for fall prevention, nutrition, physical activity, Tobacco-use cessation, social engagement, weight loss and cognition.  Checklist reviewing preventive services available has been given to the patient.  Reviewed patient's diet, addressing concerns and/or questions.   She is at risk for psychosocial distress and has been provided with information to reduce risk.       See Patient Instructions    Roberto Lyle is a 49 year old, presenting for the following:  Physical (Pt also requesting bloodwork)        11/18/2024     6:55 AM   Additional Questions   Roomed by Erica Wisdom RN   Accompanied by Self        Via the Health Maintenance questionnaire, the patient has reported the following services have been completed -Eye Exam: [facility] [place], [place] 2024-09-28, this information has been sent to the abstraction team.    HPI      Diabetes Follow-up    How often are you checking your blood sugar? A few times a week  What time of day are you checking your blood sugars (select all that apply)?  Before meals  Have you had any blood sugars above 200?  No  Have you had any blood sugars below 70?  No  What symptoms do you notice when your blood sugar is low?  Shaky and Lethargy  What concerns do you have today about your diabetes? None   Do you have any of these symptoms? (Select all that apply)  No numbness or tingling in feet.  No redness, sores or blisters on feet.  No complaints of excessive thirst.  No reports of blurry vision.  No significant changes to weight.      BP Readings from Last 2 Encounters:   11/18/24 118/78   05/02/24 118/83     Hemoglobin A1C (%)   Date Value "   05/02/2024 6.6 (H)   10/31/2023 6.0 (H)     LDL Cholesterol Calculated (mg/dL)   Date Value   10/31/2023 51   10/31/2022 45   07/13/2010 146 (H)   10/09/2007 124             Hyperlipidemia Follow-Up    Are you regularly taking any medication or supplement to lower your cholesterol?   Yes- Lipitor  Are you having muscle aches or other side effects that you think could be caused by your cholesterol lowering medication?  No    Health Care Directive  Patient does not have a Health Care Directive: Discussed advance care planning with patient; however, patient declined at this time.      11/12/2024   General Health   How would you rate your overall physical health? (!) FAIR   Feel stress (tense, anxious, or unable to sleep) To some extent      (!) STRESS CONCERN      11/12/2024   Nutrition   Three or more servings of calcium each day? Yes   Diet: Carbohydrate counting   How many servings of fruit and vegetables per day? (!) 2-3   How many sweetened beverages each day? 0-1            11/12/2024   Exercise   Days per week of moderate/strenous exercise 0 days   Average minutes spent exercising at this level 0 min      (!) EXERCISE CONCERN      11/12/2024   Social Factors   Frequency of gathering with friends or relatives Once a week   Worry food won't last until get money to buy more No   Food not last or not have enough money for food? No   Do you have housing? (Housing is defined as stable permanent housing and does not include staying ouside in a car, in a tent, in an abandoned building, in an overnight shelter, or couch-surfing.) Yes   Are you worried about losing your housing? No   Lack of transportation? No   Unable to get utilities (heat,electricity)? No            11/12/2024   Dental   Dentist two times every year? Yes            11/12/2024   TB Screening   Were you born outside of the [place]? No              Today's PHQ-2 Score:       5/2/2024    10:25 AM   PHQ-2 ( 1999 [employer])   Q1: Little interest or pleasure in  doing things 0   Q2: Feeling down, depressed or hopeless 0   PHQ-2 Score 0         11/12/2024   Substance Use   Alcohol more than 3/day or more than 7/wk No   Do you use any other substances recreationally? No        Social History     Tobacco Use    Smoking status: Some Days     Types: Cigarettes    Smokeless tobacco: Never    Tobacco comments:     3/2022 at 1-2 cigs per day & cutting down   Vaping Use    Vaping status: Never Used   Substance Use Topics    Alcohol use: Yes     Alcohol/week: 2.0 standard drinks of alcohol     Types: 2 Standard drinks or equivalent per week     Comment: socially-minimal    Drug use: No           5/15/2024   LAST FHS-7 RESULTS   1st degree relative breast or ovarian cancer No   Any relative bilateral breast cancer No   Any male have breast cancer No   Any ONE woman have BOTH breast AND ovarian cancer No   Any woman with breast cancer before 50yrs No   2 or more relatives with breast AND/OR ovarian cancer No   2 or more relatives with breast AND/OR bowel cancer No           Mammogram Screening - Mammogram every 1-2 years updated in Health Maintenance based on mutual decision making        11/12/2024   STI Screening   New sexual partner(s) since last STI/HIV test? (!) YES         History of abnormal Pap smear: No - age 30-64 HPV with reflex Pap every 5 years recommended        Latest Ref Rng & Units 2/14/2022     4:55 PM 11/18/2020     1:12 PM 11/18/2020    12:40 PM   PAP / HPV   PAP  Negative for Intraepithelial Lesion or Malignancy (NILM)      PAP (Historical)    ASC-US    HPV 16 DNA Negative Negative  Negative     HPV 18 DNA Negative Negative  Negative     Other HR HPV Negative Negative  Positive       ASCVD Risk   The ASCVD Risk score (Kaitlynn CAPPS, et al., 2019) failed to calculate for the following reasons:    The valid total cholesterol range is 130 to 320 mg/dL        11/12/2024   Contraception/Family Planning   Questions about contraception or family planning No          "  Reviewed and updated as needed this visit by Provider                    Past Medical History:   Diagnosis Date    Abnormal Pap smear of cervix     2010,2011,  6/12, 11/18/20    Allergic rhinitis, cause unspecified     Allergic rhinitis - OTC using claritin    High risk HPV infection     2013, 5/2015, 01/12/17, 05/24/18, 2019, 2020    Hx of colposcopy with cervical biopsy 07/03/2015, 03/13/17    WNL, 03/13/17: [place] WNL.     Obesity, unspecified     diet and exercising     Past Surgical History:   Procedure Laterality Date    COLONOSCOPY N/A 4/9/2024    Procedure: Colonoscopy with polypectomies using exacto snare and two clips;  Surgeon: Elijah Foley MD;  Location:  GI    TONSILLECTOMY  1988         Review of Systems  Constitutional, HEENT, cardiovascular, pulmonary, GI, , musculoskeletal, neuro, skin, endocrine and psych systems are negative, except as otherwise noted.     Objective    Exam  /78 (BP Location: Right arm, Patient Position: Sitting, Cuff Size: Adult Regular)   Pulse 120   Temp 98.5  F (36.9  C) (Tympanic)   Resp 18   Ht 1.575 m (5' 2\")   Wt 132.1 kg (291 lb 4.8 oz)   LMP 10/15/2024   SpO2 97%   BMI 53.28 kg/m     Estimated body mass index is 53.28 kg/m  as calculated from the following:    Height as of this encounter: 1.575 m (5' 2\").    Weight as of this encounter: 132.1 kg (291 lb 4.8 oz).    Physical Exam  GENERAL: alert and no distress  EYES: Eyes grossly normal to inspection, PERRL and conjunctivae and sclerae normal  HENT: ear canals and TM's normal, nose and mouth without ulcers or lesions  NECK: no adenopathy, no asymmetry, masses, or scars  RESP: lungs clear to auscultation - no rales, rhonchi or wheezes  BREAST: normal without masses, tenderness or nipple discharge and no palpable axillary masses or adenopathy  CV: regular rate and rhythm, normal S1 S2, no S3 or S4, no murmur, click or rub, no peripheral edema  ABDOMEN: soft, nontender, no hepatosplenomegaly, no " masses and bowel sounds normal  MS: no gross musculoskeletal defects noted, no edema  SKIN: no suspicious lesions or rashes  NEURO: Normal strength and tone, mentation intact and speech normal  PSYCH: mentation appears normal, affect normal/bright        Signed Electronically by: Mandy Canela, CNP

## 2024-11-22 ENCOUNTER — MYC MEDICAL ADVICE (OUTPATIENT)
Dept: FAMILY MEDICINE | Facility: CLINIC | Age: 49
End: 2024-11-22
Payer: COMMERCIAL

## 2025-02-22 ENCOUNTER — HEALTH MAINTENANCE LETTER (OUTPATIENT)
Age: 50
End: 2025-02-22

## 2025-05-09 DIAGNOSIS — E11.9 TYPE 2 DIABETES MELLITUS WITHOUT COMPLICATION, WITHOUT LONG-TERM CURRENT USE OF INSULIN (H): ICD-10-CM

## 2025-05-09 DIAGNOSIS — E66.01 MORBID OBESITY (H): ICD-10-CM

## 2025-05-12 ENCOUNTER — RESULTS FOLLOW-UP (OUTPATIENT)
Dept: FAMILY MEDICINE | Facility: CLINIC | Age: 50
End: 2025-05-12

## 2025-05-12 RX ORDER — TIRZEPATIDE 5 MG/.5ML
INJECTION, SOLUTION SUBCUTANEOUS
Qty: 2 ML | Refills: 0 | Status: SHIPPED | OUTPATIENT
Start: 2025-05-12

## 2025-07-08 DIAGNOSIS — E11.9 TYPE 2 DIABETES MELLITUS WITHOUT COMPLICATION, WITHOUT LONG-TERM CURRENT USE OF INSULIN (H): ICD-10-CM

## 2025-07-08 DIAGNOSIS — E66.01 MORBID OBESITY (H): ICD-10-CM

## 2025-07-09 RX ORDER — TIRZEPATIDE 5 MG/.5ML
INJECTION, SOLUTION SUBCUTANEOUS
Qty: 2 ML | Refills: 2 | Status: SHIPPED | OUTPATIENT
Start: 2025-07-09

## 2025-07-09 NOTE — TELEPHONE ENCOUNTER
Medication passed protocol, however, refill RN could not approve because of the medication warning/interaction. Provider, please approve or deny the prescription.    Nafisa Fox RN on 7/9/2025 at 10:45 AM

## 2025-07-24 ENCOUNTER — HOSPITAL ENCOUNTER (OUTPATIENT)
Dept: ULTRASOUND IMAGING | Facility: CLINIC | Age: 50
Discharge: HOME | End: 2025-07-24
Attending: NURSE PRACTITIONER
Payer: COMMERCIAL

## 2025-07-24 DIAGNOSIS — N83.209 CYST OF OVARY, UNSPECIFIED LATERALITY: ICD-10-CM

## 2025-07-24 PROCEDURE — 76856 US EXAM PELVIC COMPLETE: CPT

## 2025-07-26 ENCOUNTER — MYC MEDICAL ADVICE (OUTPATIENT)
Dept: FAMILY MEDICINE | Facility: CLINIC | Age: 50
End: 2025-07-26
Payer: COMMERCIAL

## 2025-07-26 DIAGNOSIS — E11.9 TYPE 2 DIABETES MELLITUS WITHOUT COMPLICATION, WITHOUT LONG-TERM CURRENT USE OF INSULIN (H): ICD-10-CM

## 2025-07-26 DIAGNOSIS — E66.813 CLASS 3 SEVERE OBESITY DUE TO EXCESS CALORIES WITH SERIOUS COMORBIDITY AND BODY MASS INDEX (BMI) OF 50.0 TO 59.9 IN ADULT (H): Primary | ICD-10-CM

## 2025-07-28 ENCOUNTER — PATIENT OUTREACH (OUTPATIENT)
Dept: CARE COORDINATION | Facility: CLINIC | Age: 50
End: 2025-07-28
Payer: COMMERCIAL

## 2025-07-28 NOTE — TELEPHONE ENCOUNTER
LOV 5/9/25    Mounjaro 5 mg ordered on 7/9/25 with 2 refills    Due for refill, but wondering if you want to increase dose or stick with 5 mg?    Routing to provider to review and advise.     Erica Vargas RN   [facility]

## 2025-07-30 ENCOUNTER — PATIENT OUTREACH (OUTPATIENT)
Dept: CARE COORDINATION | Facility: CLINIC | Age: 50
End: 2025-07-30
Payer: COMMERCIAL

## 2025-08-16 ENCOUNTER — HEALTH MAINTENANCE LETTER (OUTPATIENT)
Age: 50
End: 2025-08-16

## 2025-08-27 ENCOUNTER — ANCILLARY PROCEDURE (OUTPATIENT)
Dept: MRI IMAGING | Facility: CLINIC | Age: 50
End: 2025-08-27
Attending: NURSE PRACTITIONER
Payer: COMMERCIAL

## 2025-08-27 DIAGNOSIS — N83.292 COMPLEX CYST OF LEFT OVARY: ICD-10-CM

## 2025-08-27 PROCEDURE — 255N000002 HC RX 255 OP 636: Performed by: NURSE PRACTITIONER

## 2025-08-27 PROCEDURE — 72197 MRI PELVIS W/O & W/DYE: CPT

## 2025-08-27 PROCEDURE — A9585 GADOBUTROL INJECTION: HCPCS | Performed by: NURSE PRACTITIONER

## 2025-08-27 RX ORDER — GADOBUTROL 604.72 MG/ML
12 INJECTION INTRAVENOUS ONCE
Status: COMPLETED | OUTPATIENT
Start: 2025-08-27 | End: 2025-08-27

## 2025-08-27 RX ADMIN — GADOBUTROL 12 ML: 604.72 INJECTION INTRAVENOUS at 06:52

## (undated) DEVICE — KIT ENDO TURNOVER/PROCEDURE W/CLEAN A SCOPE LINERS 103888

## (undated) RX ORDER — FENTANYL CITRATE 50 UG/ML
INJECTION, SOLUTION INTRAMUSCULAR; INTRAVENOUS
Status: DISPENSED
Start: 2024-04-09